# Patient Record
Sex: FEMALE | Race: WHITE | NOT HISPANIC OR LATINO | Employment: FULL TIME | URBAN - METROPOLITAN AREA
[De-identification: names, ages, dates, MRNs, and addresses within clinical notes are randomized per-mention and may not be internally consistent; named-entity substitution may affect disease eponyms.]

---

## 2017-04-21 ENCOUNTER — ALLSCRIPTS OFFICE VISIT (OUTPATIENT)
Dept: OTHER | Facility: OTHER | Age: 57
End: 2017-04-21

## 2017-04-27 DIAGNOSIS — G40.209 LOCALZ-RLTD SYMPTOMATIC EPILEPSY W CMPLX PART SZ, NOTINTRAC, WO STATUS (HCC): ICD-10-CM

## 2017-08-11 ENCOUNTER — TRANSCRIBE ORDERS (OUTPATIENT)
Dept: ADMINISTRATIVE | Facility: HOSPITAL | Age: 57
End: 2017-08-11

## 2017-08-11 ENCOUNTER — ALLSCRIPTS OFFICE VISIT (OUTPATIENT)
Dept: OTHER | Facility: OTHER | Age: 57
End: 2017-08-11

## 2017-08-11 DIAGNOSIS — G40.209 COMPLEX PARTIAL SEIZURES WITH CONSCIOUSNESS IMPAIRED (HCC): Primary | ICD-10-CM

## 2017-09-06 ENCOUNTER — GENERIC CONVERSION - ENCOUNTER (OUTPATIENT)
Dept: OTHER | Facility: OTHER | Age: 57
End: 2017-09-06

## 2017-09-06 ENCOUNTER — HOSPITAL ENCOUNTER (OUTPATIENT)
Dept: NEUROLOGY | Facility: CLINIC | Age: 57
Discharge: HOME | End: 2017-09-06
Payer: COMMERCIAL

## 2017-09-06 DIAGNOSIS — G40.209 COMPLEX PARTIAL SEIZURES WITH CONSCIOUSNESS IMPAIRED (HCC): ICD-10-CM

## 2017-09-06 PROCEDURE — 95816 EEG AWAKE AND DROWSY: CPT

## 2017-11-07 ENCOUNTER — ALLSCRIPTS OFFICE VISIT (OUTPATIENT)
Dept: OTHER | Facility: OTHER | Age: 57
End: 2017-11-07

## 2017-11-07 ENCOUNTER — GENERIC CONVERSION - ENCOUNTER (OUTPATIENT)
Dept: OTHER | Facility: OTHER | Age: 57
End: 2017-11-07

## 2017-11-07 NOTE — PROGRESS NOTES
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)    Plan  Localization-related focal epilepsy with complex partial seizures, Polymicrogyria    · LamoTRIgine 200 MG Oral Tablet (LaMICtal); take 300 mg in the morning and  400 mg nightly   Rx By: Jose Cruz Musa; Dispense: 90 Days ; #:315 Tablet; Refill: 3;For: Localization-related focal epilepsy with complex partial seizures, Polymicrogyria; MAMTA = N; Verified Transmission to Pull); Last Updated By: System, [employer]Script[employer]; 8/11/2017 2:10:24 PM   · Zonisamide 100 MG Oral Capsule; Take 1 tablet in the morning, and 2 tablets in  the evening   Rx By: Jose Cruz Musa; Dispense: 90 Days ; #:270 Capsule; Refill: 3;For: Localization-related focal epilepsy with complex partial seizures, Polymicrogyria; MAMTA = N; Verified Transmission to Pull); Last Updated By: System, SureScript[employer]; 8/11/2017 2:10:25 PM   · EEG AWAKE (ROUTINE); Status:Active; Requested for:11Aug2017;    Perform:[facility]; Order Comments:55 yo woman with right hemisphere polymicrogyria and localization related epilepsy  Please do routine EEG to better characterize seizures and look for changes with photic stimulation  Please do photic stimulation  ; Due:11Aug2018; Last Updated By:Frederic Almazan; 8/11/2017 2:29:23 PM;Ordered; For:Localization-related focal epilepsy with complex partial seizures, Polymicrogyria; Ordered By:Amador Novoa;   · Follow-up visit in 3 months Evaluation and Treatment  Follow-up 30 min  Status:  Complete  Done: [id number]   Ordered; For: Localization-related focal epilepsy with complex partial seizures, Polymicrogyria;  Ordered By: Jose Cruz Musa Performed:  Due: [id number]; Last Updated By: Sharlene Mcdowell; 8/11/2017 2:23:15 PM    Discussion/Summary  Discussion Summary:   Vitaly Husbands is a 54 yo woman with a history of localization related epilepsy likely right hemisphere in onset, possibly right temporal lobe who is returning to Epilepsy clinic for follow up of her seizures  Prior neurologic examination was normal   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  She has continued to have seizures despite the recent increase in her Lamotrigine  She has noted some possible side effects with Zonisamide, so I will have her increase her Lamotrigine further  will increase her Lamotrigine to 300 mg in the morning and 400 mg at night  This could be increased further to 400 mg twice a day if she would continue to have seizures  better chararacerize her seizures, I will have her get a routine EEG before her next visit  spent over 25 minutes with the patient with greater than 50% of that time spent counseling and coordinating her care, specifically discussing medication changes noted above    will return to the office in about 3 months    given to patient:Increase your Lamotrigine to 300 mg in the morning and 400 mg at night  If you feel side effects such as dizziness, nausea, off balance, etc, please give our office a call  Continue to take Zonisamide unchanged  Please get a routine EEG before your next appointment  Chief Complaint  Chief Complaint Free Text Note Form: Pt present for follow up regarding seizure      History of Present Illness  HPI: medications:Zonisamide 100 mg in the morning and 200 mg at night  Lamotrigine 300 mg twice a dayCalcium and vit D  Stephie Woo is a 56 yo woman with a history of localization related epilepsy likely right hemisphere in onset, possibly right temporal lobe who is returning to Epilepsy clinic for follow up of her seizures  I last saw her on 4/21 as an initial visit  At that time, she had not had any seizures since she was started on Zonisamide  No changes were made to her medications  her last visit, she had about 4 seizures one shortly after her last visit, and the second occurred last night   With these episodes, she was unresponsive to others around her  She also had an episode in May where she felt a little lightheaded and appeared confused  She was giving just one word answers  Also, in June, she had another episode where she felt lightheaded and had garbled speech, but with this episode, she started staring and was not responding  She has been having some trouble with feeling anxious and she has been under more stress because of a recent death in the family  She called into the office in the interim and her Lamotrigine dose was increased to her current dose  history was also reviewed with her sister, who was present at todayâs visit  Characteristics:Simple partial seizures  manifest as a feeling of dÃ©jÃ  vu and occasionally also have some circling left arm movements  These are typically quite brief, without any alteration of her consciousness and occur about 1-2 times a month  Complex partial seizures  manifest as staring and unresponsiveness, occasionally with left leg shaking, hand automatisms (unclear laterality) and oral automatisms  These appear to have occurred on three occasions in January and March of 2017  Secondarily generalized tonic clonic seizures  no clear warning  She will lose consciousness and be unresponsive, on occasion being told she also had whole body shaking  Medications: Phenytoin (changed due to potential for side effects), Levetiracetam (behavioral changes)       Current Meds   1  Calcium 600+D 600-200 MG-UNIT Oral Tablet; TAKE 1 TABLET TWICE DAILY; Therapy: (Recorded:06Nov2013) to Recorded   2  LamoTRIgine 200 MG Oral Tablet; take 300 mg twice a day  Requested for: [id number];   Last Rx:19Jun2017 Ordered   3  Zonegran 100 MG Oral Capsule; Take 1 tablet in the morning, and 2 tablets in the   evening; Therapy: (Recorded:21Apr2017) to Recorded  Medication List Reviewed: The medication list was reviewed and updated today  Allergies  1   No Known Drug Allergies    Future Appointments    Date/Time Provider Specialty Site   11/07/2017 02:30 PM DONNIE Espinoza   Neurology [facility]     Signatures   Electronically signed by : DONNIE Salinas ; Nov 6 2017 10:58PM EST                       (Author)

## 2018-01-11 NOTE — PROCEDURES
Procedures by Jimi Green MD at 2017   2:14 PM      Author:  Jimi Green MD Service:  Neurology Author Type:  Physician    Filed:  2017  2:38 PM Date of Service:  2017  2:14 PM Status:  Signed    :  Jimi Green MD (Physician)        Procedure Orders:       1  EEG Routine and awake [[id number]] ordered by  at 17 1425                  ELECTROENCEPHALOGRAM (EEG)      Patient Name:  Kelley Templeton  MRN: [id number]   :  1960 File #: IHC [id number]   Age: 55 y o  Encounter #: [id number]   Date performed: 2017            Report date: 2017          Study type: Routine EEG    ICD 10 diagnosis: Complex partial epilepsy intractable without status  G40 219    Start time: 09:40 End time: 10:16     -------------------------------------------------------------------------------------------------------------------   Patient History: This recording was observed in a 55 y o  female with  localization related epilepsy to better characterize epilepsy  Medications include: Lamotrigine and Zonisamide  -------------------------------------------------------------------------------------------------------------------   Description of Procedure:  ·  32 channel digital recording with electrodes placed according to the International 10-20 system with additional  T1/T2 electrodes, EOG, EKG, and simultaneous  video  The recording was technically satisfactory  -------------------------------------------------------------------------------------------------------------------   EEG Description:    The recording was performed with the patient awake  She was fully oriented  During wakefulness, there were long runs of poorly regulated, low amplitude, posteriorly  dominant, symmetric 7-9 cps alpha rhythm that attenuated with eye opening  There were symmetric low amplitude, frontally dominant beta activities    There were sporadic low to medium amplitude, right temporal theta and delta activities  Drowsiness and sleep were not attained  -------------------------------------------------------------------------------------------------------------------   Activation Procedures:  Hyperventilation was performed for 3-4  minutes and did not produce any changes  Stepped photic stimulation between 1-20 cps was performed and induced symmetric  photic driving  Other findings: The single lead ECG demonstrated normal sinus rhythm    -------------------------------------------------------------------------------------------------------------------   EEG Interpretation: This Routine EEG recorded during wakefulness is abnormal   Sporadic right temporal theta and delta activities suggest an area of underlying neuronal dysfunction  Junior Adams MD   [address] [facility]               Received for:Amador WELCH    Sep  6 2017  2:38PM [facility] Standard Time

## 2018-01-14 VITALS
BODY MASS INDEX: 23.98 KG/M2 | SYSTOLIC BLOOD PRESSURE: 140 MMHG | DIASTOLIC BLOOD PRESSURE: 80 MMHG | HEIGHT: 66 IN | WEIGHT: 149.19 LBS | HEART RATE: 70 BPM | RESPIRATION RATE: 16 BRPM

## 2018-01-22 VITALS
BODY MASS INDEX: 23.73 KG/M2 | OXYGEN SATURATION: 97 % | HEART RATE: 83 BPM | RESPIRATION RATE: 16 BRPM | SYSTOLIC BLOOD PRESSURE: 136 MMHG | WEIGHT: 147 LBS | DIASTOLIC BLOOD PRESSURE: 78 MMHG

## 2018-02-14 ENCOUNTER — OFFICE VISIT (OUTPATIENT)
Dept: NEUROLOGY | Facility: CLINIC | Age: 58
End: 2018-02-14
Payer: COMMERCIAL

## 2018-02-14 ENCOUNTER — TELEPHONE (OUTPATIENT)
Dept: NEUROLOGY | Facility: CLINIC | Age: 58
End: 2018-02-14

## 2018-02-14 VITALS
HEIGHT: 67 IN | DIASTOLIC BLOOD PRESSURE: 60 MMHG | WEIGHT: 146 LBS | SYSTOLIC BLOOD PRESSURE: 135 MMHG | HEART RATE: 77 BPM | BODY MASS INDEX: 22.91 KG/M2

## 2018-02-14 DIAGNOSIS — G40.209 LOCALIZATION-RELATED FOCAL EPILEPSY WITH COMPLEX PARTIAL SEIZURES (HCC): Primary | ICD-10-CM

## 2018-02-14 DIAGNOSIS — Q04.3 POLYMICROGYRIA (HCC): ICD-10-CM

## 2018-02-14 PROCEDURE — 99214 OFFICE O/P EST MOD 30 MIN: CPT | Performed by: PSYCHIATRY & NEUROLOGY

## 2018-02-14 RX ORDER — ZONISAMIDE 100 MG/1
CAPSULE ORAL
COMMUNITY
End: 2018-02-14 | Stop reason: SDUPTHER

## 2018-02-14 RX ORDER — B-COMPLEX WITH VITAMIN C
1 TABLET ORAL 2 TIMES DAILY
COMMUNITY

## 2018-02-14 RX ORDER — ZONISAMIDE 100 MG/1
200 CAPSULE ORAL 2 TIMES DAILY
Qty: 120 CAPSULE | Refills: 1 | Status: SHIPPED | OUTPATIENT
Start: 2018-02-14 | End: 2018-04-16 | Stop reason: ALTCHOICE

## 2018-02-14 RX ORDER — ZONISAMIDE 100 MG/1
200 CAPSULE ORAL 2 TIMES DAILY
Qty: 360 CAPSULE | Refills: 3 | Status: SHIPPED | OUTPATIENT
Start: 2018-02-14 | End: 2018-07-09 | Stop reason: SDUPTHER

## 2018-02-14 RX ORDER — LAMOTRIGINE 200 MG/1
TABLET ORAL
COMMUNITY
End: 2018-07-09 | Stop reason: SDUPTHER

## 2018-02-14 NOTE — PATIENT INSTRUCTIONS
-- Continue your lamotrigine 300 mg in the morning and 400 mg at night  -- Please increase your Zonisamide to 200 mg twice a day  -- please call our office if any problems arise

## 2018-02-14 NOTE — TELEPHONE ENCOUNTER
Just an FYI  The patient called to report that she "may or may not have a sz last night"  Pt has appt today w/ you @ 11am and will discuss in detail at that time

## 2018-02-14 NOTE — PROGRESS NOTES
Patient ID: Clementina Garcia is a 65 y o  female with localization related epilepsy due to right hemisphere polymicrogyria, who is returning to Neurology office for follow up of her seizure  Assessment/Plan:    Localization-related focal epilepsy with complex partial seizures ([facility] [address] )  She unfortunately had another seizure since her last visit  She appears to be having some manageable peak dose side effects with Lamotrigine, so we likely cannot increase this dose any further  She does not recall having had side effects with Zonisamide in the past, so I will increase the dose of this medication  -- She will increase Zonisamide to 200 mg twice a day  I discussed the risks and rationale of this increase  -- she will continue lamotrigine unchanged and will continue to take her medications with food  -- we extensively discussed seizure safety including going to water aerobics and the risks associated with being in a pool  I spent a total of 25 min with the patient with greater than 50% of that time spent counseling and coordinating her care, specifically discussing seizure safety precautions, medications adjustments, and plan as noted above  Subjective:    HPI  Current seizure medications:  1  Lamotrigine 300 mg in the morning and 400 mg at night  2  Zonisamide 100 mg in the morning and 200 mg at night  Other medications as per Epic  I last saw her in the office on 11/7/2017  At that time, she had still experienced some seizures, so her dose of Lamotrigine was increased to her current dose  Since her last appointment, she was doing well with the increased lamotrigine  She did start having some trouble with blurred vision, which typically occurs in the mornings after taking her medications, but this is better when she eats around when she takes her medications   She had two episodes of just feeling a little lightheaded, without any alteration of consciousness, but yesterday, she had another complex partial seizure  She was at the gym doing water aerobics with her cousins, when she felt "goofy" which is her typical aura  She then was noted by family to be unable to speak and unresponsive  She was confused afterwards  The following portions of the patient's history were reviewed and updated as appropriate: allergies, current medications and problem list          Objective:    Blood pressure 135/60, pulse 77, height 5' 6 5" (1 689 m), weight 66 2 kg (146 lb)  Physical Exam    Neurological Exam      ROS:    Review of Systems   Constitutional: Positive for fatigue  Negative for appetite change and fever  HENT: Negative  Negative for hearing loss, tinnitus, trouble swallowing and voice change  Eyes: Negative  Negative for photophobia and pain  Respiratory: Negative  Negative for shortness of breath  Cardiovascular: Negative  Negative for palpitations  Gastrointestinal: Negative  Negative for nausea and vomiting  Endocrine: Negative  Negative for cold intolerance and heat intolerance  Genitourinary: Negative for dysuria and urgency  Musculoskeletal: Negative  Negative for myalgias and neck pain  Skin: Negative  Negative for rash  Allergic/Immunologic: Negative  Neurological: Positive for seizures  Negative for dizziness, tremors, syncope, facial asymmetry, speech difficulty, weakness, light-headedness, numbness and headaches  Hematological: Negative  Does not bruise/bleed easily  Psychiatric/Behavioral: Negative for confusion, hallucinations and sleep disturbance  The patient is nervous/anxious

## 2018-02-15 DIAGNOSIS — G40.109 LOCALIZATION-RELATED EPILEPSY (HCC): Primary | ICD-10-CM

## 2018-02-15 RX ORDER — ZONISAMIDE 100 MG/1
CAPSULE ORAL
Qty: 360 CAPSULE | Refills: 3 | Status: SHIPPED | OUTPATIENT
Start: 2018-02-15 | End: 2018-04-16 | Stop reason: ALTCHOICE

## 2018-02-15 NOTE — ASSESSMENT & PLAN NOTE
She unfortunately had another seizure since her last visit  She appears to be having some manageable peak dose side effects with Lamotrigine, so we likely cannot increase this dose any further  She does not recall having had side effects with Zonisamide in the past, so I will increase the dose of this medication  -- She will increase Zonisamide to 200 mg twice a day  I discussed the risks and rationale of this increase  -- she will continue lamotrigine unchanged and will continue to take her medications with food  -- we extensively discussed seizure safety including going to water aerobics and the risks associated with being in a pool

## 2018-03-23 ENCOUNTER — TELEPHONE (OUTPATIENT)
Dept: NEUROLOGY | Facility: CLINIC | Age: 58
End: 2018-03-23

## 2018-03-23 NOTE — TELEPHONE ENCOUNTER
----- Message from [facility] sent at 3/23/2018  8:20 AM EDT -----  Regarding: Prescription Question  Contact: [phone]  I have just been prescribed Pantoprazole S[address] 40 mg tab, once daily for acid reflux  One of the possible side effects listed is seizures  Do you feel it is safe for me to take this medication? I have not have any seizures since my last visit, I have only had one lightheaded spell of no consequence    Thanks,  Toya Kahn

## 2018-03-23 NOTE — TELEPHONE ENCOUNTER
I do not have any objections to her taking pantoprazole  That medication is not likely to cause seizures

## 2018-04-16 ENCOUNTER — TELEPHONE (OUTPATIENT)
Dept: NEUROLOGY | Facility: CLINIC | Age: 58
End: 2018-04-16

## 2018-04-16 ENCOUNTER — HOSPITAL ENCOUNTER (EMERGENCY)
Facility: HOSPITAL | Age: 58
Discharge: HOME | End: 2018-04-16
Attending: EMERGENCY MEDICINE | Admitting: EMERGENCY MEDICINE
Payer: COMMERCIAL

## 2018-04-16 VITALS
HEIGHT: 66 IN | OXYGEN SATURATION: 99 % | HEART RATE: 81 BPM | TEMPERATURE: 98.1 F | DIASTOLIC BLOOD PRESSURE: 70 MMHG | BODY MASS INDEX: 24.27 KG/M2 | WEIGHT: 151 LBS | RESPIRATION RATE: 18 BRPM | SYSTOLIC BLOOD PRESSURE: 153 MMHG

## 2018-04-16 DIAGNOSIS — R56.9 SEIZURE (HCC): Primary | ICD-10-CM

## 2018-04-16 LAB
ATRIAL RATE: 104 BPM
P AXIS: 52 DEGREES
PR INTERVAL: 180 MS
QRS AXIS: -60 DEGREES
QRSD INTERVAL: 90 MS
QT INTERVAL: 354 MS
QTC INTERVAL: 465 MS
T WAVE AXIS: 49 DEGREES
VENTRICULAR RATE: 104 BPM

## 2018-04-16 PROCEDURE — 99284 EMERGENCY DEPT VISIT MOD MDM: CPT

## 2018-04-16 PROCEDURE — 93010 ELECTROCARDIOGRAM REPORT: CPT | Performed by: INTERNAL MEDICINE

## 2018-04-16 PROCEDURE — 36415 COLL VENOUS BLD VENIPUNCTURE: CPT | Performed by: EMERGENCY MEDICINE

## 2018-04-16 PROCEDURE — 80203 DRUG SCREEN QUANT ZONISAMIDE: CPT | Performed by: EMERGENCY MEDICINE

## 2018-04-16 PROCEDURE — 93005 ELECTROCARDIOGRAM TRACING: CPT

## 2018-04-16 PROCEDURE — 80175 DRUG SCREEN QUAN LAMOTRIGINE: CPT | Performed by: EMERGENCY MEDICINE

## 2018-04-16 RX ORDER — PANTOPRAZOLE SODIUM 40 MG/1
40 TABLET, DELAYED RELEASE ORAL DAILY
COMMUNITY

## 2018-04-16 NOTE — DISCHARGE INSTRUCTIONS
Nonepileptic Seizures   WHAT YOU NEED TO KNOW:   A nonepileptic seizure (SHAVON) is a short period of changes in how you move, think, or feel  It is sometimes called a nonepileptic event or episode  A SHAVON looks like an epileptic seizure, but there are no electrical changes in the brain  Epilepsy medicine will not stop or prevent a SHAVON  A SHAVON is a serious condition  Early diagnosis and treatment are needed to prevent more problems  DISCHARGE INSTRUCTIONS:   Call 911 for any of the following:   · You have chest pain, tightness, or pressure that may spread to your shoulders, arms, jaw, neck, or back  · You are having breathing problems and your lips, fingernails, or face turn blue  · You had a seizure that continued for more than 5 minutes  Return to the emergency department if:   · You feel like fainting or are lightheaded or too dizzy to stand up  · You were injured during or after a seizure  · You think about hurting or killing yourself or someone else  Contact your healthcare provider if:   · You are depressed and feel you cannot cope with your illness  · You are confused or cannot think clearly  · You have new symptoms that you did not have at your last healthcare provider visit  · You have questions or concerns about your condition or care  Medicines: You may need any of the following:  · Medicines  may be given to treat a physical cause, such as blood sugar or blood pressure problems  Medicines may instead be given for severe mental stress if that is the cause of your SHAVON  You may need antianxiety medicines to help keep you calm and relaxed  Antidepressants help decrease depression  · Take your medicine as directed  Contact your healthcare provider if you think your medicine is not helping or if you have side effects  Tell him or her if you are allergic to any medicine  Keep a list of the medicines, vitamins, and herbs you take  Include the amounts, and when and why you take them  Bring the list or the pill bottles to follow-up visits  Carry your medicine list with you in case of an emergency  What you can do to manage SHAVON:   · Ask what safety precautions you should take  Talk with your healthcare provider about driving  You may not be able to drive until you are seizure-free for a period of time  You will need to check the law where you live  Also talk to your healthcare provider about swimming and bathing  You may drown or develop life-threatening heart or lung damage if you have a seizure in water  · Tell your friends, family members, and coworkers that you have had a seizure  Give them written instructions to follow if you have another seizure  Your healthcare provider can help you create a list that is specific to your signs and symptoms  · Keep a seizure diary  This can help you find your triggers and avoid them  Write down the dates of your seizures, where you were, and what you were doing  Include how you felt before and after  Possible triggers include illness, lack of sleep, hormonal changes, alcohol, drugs, lights, or stress  What you can do to prevent a SHAVON:  You may not be able to prevent every seizure  The following can help you manage triggers that may make a seizure start:  · Set a regular sleep schedule  Try to go to sleep and wake up at the same times each day  Sleep problems can trigger a SHAVON  Talk to your healthcare provider if you have trouble sleeping  · Exercise as often as possible  Exercise can relieve stress and help you sleep better  You may feel better with 30 minutes of exercise most days of the week  Your healthcare provider can help you create a safe exercise plan  · Limit or do not drink alcohol  Alcohol can trigger a SHAVON  Ask your healthcare provider how much alcohol is safe for you to drink  A drink of alcohol is 12 ounces of beer, 1½ ounces of liquor, or 5 ounces of wine  · Do not use illegal drugs  Drugs can trigger a SHAVON   Talk to your healthcare provider if you use illegal drugs and need help to quit  · Manage stress  Breathe deeply and slowly when you feel stressed or anxious  Relax each part of your body one at a time  Try activities that you find relaxing, such as yoga or a short walk  Music can help you relax  You may also want to join a support group so you can talk with others who have SHAVON  Talk to someone you trust about your feelings  · Do not smoke  Nicotine and other chemicals in cigarettes and cigars can make stress and anxiety worse  Ask your healthcare provider for information if you currently smoke and need help to quit  E-cigarettes or smokeless tobacco still contain nicotine  Talk to your healthcare provider before you use these products  Follow up with your healthcare provider as directed: Your healthcare provider may refer you to a therapist or psychologist  Write down your questions so you remember to ask them during your visits  © 2017 [employer] [employer] Information is for End User's use only and may not be sold, redistributed or otherwise used for commercial purposes  All illustrations and images included in CareNotes® are the copyrighted property of [employer] , [employer]  or [employer]  The above information is an  only  It is not intended as medical advice for individual conditions or treatments  Talk to your doctor, nurse or pharmacist before following any medical regimen to see if it is safe and effective for you

## 2018-04-16 NOTE — ED PROVIDER NOTES
History  Chief Complaint   Patient presents with    Seizure - Prior Hx Of     Patient presents to [facility]  being hypertensive and tachycardic post seizure activity while at work today  81-year-old female with history of catatonic seizures who presents to the see department after a seizure at work today  Patient is currently on lamotrigine and then ask him I would which she states she takes religiously today patient's boss states she when outside because she was feeling unwell which she usually does when she feels the onset of a seizure coming she was observed to be completely catatonic and unresponsive with her eyes wide open and mouth open  EMS was called and while transporting patient came to remained confused and postictal for a few minutes but is returned to baseline by time she has arrived to the emergency department  Patient recently had increase in her dose of zonisamide from 102 100 mg once a day each to 200 b i d  back in February due to a seizure  Patient denies any symptoms at this time is alert and oriented denies any nausea or vomiting denies any lightheadedness, chest pain or shortness of breath  Patient's remaining ROS negative  Patient denies lack of sleep, alcohol use drug use, or stressors the past few days  History provided by:  Patient   used: No    Seizure - Prior Hx Of   Seizure activity on arrival: no    Seizure type:  Partial complex  Preceding symptoms: aura    Initial focality:  None  Episode characteristics: unresponsiveness    Postictal symptoms: confusion and memory loss    Return to baseline: yes    Severity:  Moderate  Progression:  Resolved  Recent head injury:  No recent head injuries  PTA treatment:  None  History of seizures: yes        Prior to Admission Medications   Prescriptions Last Dose Informant Patient Reported? Taking?    Calcium Carbonate-Vitamin D (CALCIUM 600+D) 600-200 MG-UNIT TABS 4/16/2018 at Unknown time  Yes Yes   Sig: Take 1 tablet by mouth 2 (two) times a day   lamoTRIgine (LaMICtal) 200 MG tablet 4/16/2018 at Unknown time  Yes Yes   Sig: Take by mouth Take 300 mg in the morning and 400 mg at night  pantoprazole (PROTONIX) 40 mg tablet 4/16/2018 at Unknown time  Yes Yes   Sig: Take 40 mg by mouth daily   zonisamide (ZONEGRAN) 100 mg capsule 4/16/2018 at Unknown time  No Yes   Sig: Take 2 capsules (200 mg total) by mouth 2 (two) times a day   Patient taking differently: Take 400 mg by mouth 2 (two) times a day        Facility-Administered Medications: None       Past Medical History:   Diagnosis Date    Seizure [facility])        Past Surgical History:   Procedure Laterality Date    BRAIN SURGERY      HYSTERECTOMY      OVARIAN CYST SURGERY      WISDOM TOOTH EXTRACTION         Family History   Problem Relation Age of Onset    Seizures Mother     Stroke Mother     Thyroid disease Mother     Heart disease Father      I have reviewed and agree with the history as documented  Social History   Substance Use Topics    Smoking status: Former Smoker    Smokeless tobacco: Never Used    Alcohol use No        Review of Systems   Constitutional: Negative for chills and fever  HENT: Negative for sore throat  Eyes: Negative for visual disturbance  Respiratory: Negative for chest tightness, shortness of breath and wheezing  Cardiovascular: Negative for chest pain  Gastrointestinal: Negative for abdominal pain, blood in stool, constipation, diarrhea, nausea and vomiting  Genitourinary: Negative for dysuria and hematuria  Musculoskeletal: Negative for arthralgias and myalgias  Skin: Negative for color change  Neurological: Positive for seizures  Negative for light-headedness  Hematological: Negative for adenopathy  Psychiatric/Behavioral: Negative for agitation and behavioral problems  All other systems reviewed and are negative        Physical Exam  ED Triage Vitals [04/16/18 1336]   Temperature Pulse Respirations Blood Pressure SpO2   98 1 °F (36 7 °C) 104 18 161/77 99 %      Temp Source Heart Rate Source Patient Position - Orthostatic VS BP Location FiO2 (%)   Oral Monitor Lying Right arm --      Pain Score       --           Orthostatic Vital Signs  Vitals:    04/16/18 1336 04/16/18 1510   BP: 161/77 153/70   Pulse: 104 81   Patient Position - Orthostatic VS: Lying        Physical Exam   Constitutional: She is oriented to person, place, and time  She appears well-developed and well-nourished  No distress  HENT:   Head: Normocephalic and atraumatic  Eyes: Conjunctivae and EOM are normal  No scleral icterus  Neck: Normal range of motion  Neck supple  Cardiovascular: Normal rate, regular rhythm and normal heart sounds  No murmur heard  Pulmonary/Chest: Effort normal and breath sounds normal  No respiratory distress  Abdominal: Soft  Bowel sounds are normal  There is no tenderness  Musculoskeletal: Normal range of motion  Neurological: She is alert and oriented to person, place, and time  Skin: Skin is warm and dry  Psychiatric: She has a normal mood and affect  Her behavior is normal    Nursing note and vitals reviewed  ED Medications  Medications - No data to display    Diagnostic Studies  Results Reviewed     Procedure Component Value Units Date/Time    Zonisamide level [[id number]] Collected:  04/16/18 1508    Lab Status: In process Specimen:  Blood from Arm, Left Updated:  04/16/18 1513    Lamotrigine level [[id number]] Collected:  04/16/18 1508    Lab Status:   In process Specimen:  Blood from Arm, Left Updated:  04/16/18 1513                 No orders to display         Procedures  Procedures      Phone Consults  ED Phone Contact    ED Course  ED Course                                MDM  Number of Diagnoses or Management Options  Seizure [facility]): established and worsening  Diagnosis management comments:   71-year-old female with history of seizures presents to the emergency department after seizure, discussed patient with epileptologist on call who stated no need to change her medications this time however ordered levels of her 2 medications and told patient to follow up with her neurologist in the next 2 days  Patient is agreeable to this plan will be discharged home  Amount and/or Complexity of Data Reviewed  Clinical lab tests: ordered and reviewed  Tests in the medicine section of CPT®: ordered and reviewed  Discuss the patient with other providers: yes      CritCare Time    Disposition  Final diagnoses:   Seizure ([address] )     Time reflects when diagnosis was documented in both MDM as applicable and the Disposition within this note     Time User Action Codes Description Comment    4/16/2018  2:51 PM Jorge Veras Add [R56 9] Seizure [facility])       ED Disposition     ED Disposition Condition Comment    Discharge  [facility] discharge to home/self care      Condition at discharge: Stable        Follow-up Information     Follow up With Specialties Details Why Contact Info Additional Information    Vladimir Wasserman MD Neurology Schedule an appointment as soon as possible for a visit in 1 day  [address] [facility] [phone]       [address] [place] [address] Emergency Department Emergency Medicine Go to If symptoms worsen [address] [facility] [address] [facility] ED, [address], [address]        Discharge Medication List as of 4/16/2018  3:18 PM      CONTINUE these medications which have NOT CHANGED    Details   Calcium Carbonate-Vitamin D (CALCIUM 600+D) 600-200 MG-UNIT TABS Take 1 tablet by mouth 2 (two) times a day, Historical Med      lamoTRIgine (LaMICtal) 200 MG tablet Take by mouth Take 300 mg in the morning and 400 mg at night  , Historical Med      zonisamide (ZONEGRAN) 100 mg capsule Take 2 capsules (200 mg total) by mouth 2 (two) times a day, Starting Wed 2/14/2018, Normal      pantoprazole (PROTONIX) 40 mg tablet Take 40 mg by mouth daily, Historical Med           No discharge procedures on file  ED Provider  Attending physically available and evaluated Mary Ann Danielson  DELVIS managed the patient along with the ED Attending      Electronically Signed by         Criss Paul MD  04/16/18 7309

## 2018-04-16 NOTE — ED ATTENDING ATTESTATION
Brian Chanel MD, saw and evaluated the patient  I have discussed the patient with the resident/non-physician practitioner and agree with the resident's/non-physician practitioner's findings, Plan of Care, and MDM as documented in the resident's/non-physician practitioner's note, except where noted  All available labs and Radiology studies were reviewed  At this point I agree with the current assessment done in the Emergency Department  I have conducted an independent evaluation of this patient a history and physical is as follows:    History was ob    tained by the patient and her employer who witnessed the episode  Patient has a history of epilepsy with generalized tonic-clonic seizures and catatonic seizures  This morning the patient felt well when she got up for work and her work day began normally  This afternoon just prior to arrival the patient felt and ill described, nonspecific sense of feeling unwell and felt as though she needed to go outside to get some fresh air the patient told her employ year who had a co-worker company her outside  Once the patient got outside the patient had a staring episode where she was unresponsive but maintained her upright postural tone  EMS was summoned and transported patient to the hospital   EN route the patient regained consciousness was briefly postictal and now is back to her normal mental status  The patient states she has no complaint at this time and feels completely normal  Physical exam demonstrates a pleasant alert nontoxic female in no acute distress  HEENT exam was normal   There is no evidence of craniofacial trauma  The lungs are clear with equal breath sounds  The heart had a regular rate rhythm  The abdomen is soft and nontender  Extremities are nontender with a full range of motion  The neurologic exam is nonfocal  The patient was alert and oriented x3    Critical Care Time  CritCare Time    Procedures

## 2018-04-17 ENCOUNTER — OFFICE VISIT (OUTPATIENT)
Dept: NEUROLOGY | Facility: CLINIC | Age: 58
End: 2018-04-17
Payer: COMMERCIAL

## 2018-04-17 VITALS
WEIGHT: 150.6 LBS | DIASTOLIC BLOOD PRESSURE: 69 MMHG | HEART RATE: 73 BPM | BODY MASS INDEX: 24.2 KG/M2 | SYSTOLIC BLOOD PRESSURE: 148 MMHG | HEIGHT: 66 IN

## 2018-04-17 DIAGNOSIS — Q04.3 POLYMICROGYRIA (HCC): ICD-10-CM

## 2018-04-17 DIAGNOSIS — G40.209 LOCALIZATION-RELATED FOCAL EPILEPSY WITH COMPLEX PARTIAL SEIZURES (HCC): Primary | ICD-10-CM

## 2018-04-17 LAB — LAMOTRIGINE SERPL-MCNC: 10.8 UG/ML (ref 2–20)

## 2018-04-17 PROCEDURE — 99214 OFFICE O/P EST MOD 30 MIN: CPT | Performed by: PSYCHIATRY & NEUROLOGY

## 2018-04-17 NOTE — PATIENT INSTRUCTIONS
-- We will arrange for you to be admitted to the Epilepsy Monitoring Unit (EMU) to capture your episodes on EEG and better determine what is causing them  This will help guide what we will need to do with your medications and make your events stop  -- Continue your medications unchanged for now

## 2018-04-17 NOTE — PROGRESS NOTES
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, who is returning to Neurology office for follow up of her seizures  Assessment/Plan:    Localization-related focal epilepsy with complex partial seizures ([address] )  She appears to have another breakthrough event despite reasonably high doses of lamotrigine and zonisamide  I discussed that although her episodes appear to be complex partial seizures, the fact that they have not responded to medications would raise the possibility there may be nonepileptic  She had previously been seizure-free for over 30 years on phenytoin, before this was changed ultimately to lamotrigine  She also had several years of seizure freedom on lamotrigine, until presumably being in status epilepticus in 2016, and then having recurrent episodes of staring and unresponsiveness since then  --because she has continued to have episodes of staring and unresponsiveness that have been resistant to treatment with medications, and that her seizure frequency has changed dramatically recently, I think it would be best to have her admitted to the epilepsy monitoring unit in order to better capture and characterize these events  Although I am suspicious that they are epileptic seizures, they have not responded to medications thus far, and have varied a little in what occurs with each event  --I will arrange for her to be admitted to the epilepsy monitoring unit to capture and characterize her events  While in the epilepsy monitoring unit, I would anticipate that her zonisamide and possibly her lamotrigine may be decreased in order to make her episodes more frequent  --she will continue her lamotrigine and zonisamide unchanged  If in the EMU her episodes are found to be epileptic seizures, she may benefit from changing her zonisamide to an alternative medication such as lacosamide or oxcarbazepine        I spent a total of 25 min with the patient with greater than 50% of that time spent counseling and coordinating her care, specifically discussing her diagnosis and plans for EMU admission, as detailed above       She will return to the office in about 3 months  Subjective:    HPI    Current seizure medications:  1  Lamotrigine 300 mg in the morning and 400 mg at night  2  Zonisamide 200 mg twice a day  Other medications as per [facility]  I last saw her in the office on 2/14/2018  At that time, she had experienced a breakthrough seizure, so her dose of Zonisamide was increased to 200 mg twice a day  She had been experiencing some potential side effects with her medications, but felt these were manageable if drinking plenty of fluids when taking her medications  Since her last visit, she was doing well on the higher dose of Zonisamide, but yesterday, she was at work and started to experience some odd vision changes  She went to her boss, and then began staring and was unresponsive  They were able to walk with her outside to get some air, but she stood and appeared "catatonic" and was not responding to those around her  This lasted for around 10 min and she was confused afterward  She was seen in the ED, and drug levels were checked, but no changes were made  She has been a little stressed recently, but not because of anything specific and not more than she had been previously  Prior Medications: Phenytoin (changed due to potential for side effects), Levetiracetam (behavioral changes)    Her history was also obtained from her brother-in-law, who was present at today's visit  I reviewed recent ED records, as documented in [employer]  The following portions of the patient's history were reviewed and updated as appropriate: allergies, current medications and problem list          Objective:    Blood pressure 148/69, pulse 73, height 5' 6" (1 676 m), weight 68 3 kg (150 lb 9 6 oz)      Physical Exam    Neurological Exam      ROS:    Review of Systems   Constitutional: Negative  Negative for appetite change and fever  HENT: Negative  Negative for hearing loss, tinnitus, trouble swallowing and voice change  Eyes: Negative  Negative for photophobia and pain  Blurred vision     Respiratory: Negative  Negative for shortness of breath  Cardiovascular: Negative  Negative for palpitations  Gastrointestinal: Negative  Negative for nausea and vomiting  Endocrine: Negative  Negative for cold intolerance and heat intolerance  Genitourinary: Negative  Negative for dysuria, frequency and urgency  Musculoskeletal: Negative for myalgias and neck pain  Muscle pain  Pain while walking     Skin: Negative  Negative for rash  Neurological: Positive for seizures, light-headedness and headaches  Negative for dizziness, tremors, syncope, facial asymmetry, speech difficulty, weakness and numbness  Increased sleepiness    Waking up at night  Double vision     Hematological: Negative  Does not bruise/bleed easily  Psychiatric/Behavioral: Negative for confusion, hallucinations and sleep disturbance  The patient is nervous/anxious           Depression

## 2018-04-17 NOTE — ASSESSMENT & PLAN NOTE
She appears to have another breakthrough event despite reasonably high doses of lamotrigine and zonisamide  I discussed that although her episodes appear to be complex partial seizures, the fact that they have not responded to medications would raise the possibility there may be nonepileptic  She had previously been seizure-free for over 30 years on phenytoin, before this was changed ultimately to lamotrigine  She also had several years of seizure freedom on lamotrigine, until presumably being in status epilepticus in 2016, and then having recurrent episodes of staring and unresponsiveness since then  --because she has continued to have episodes of staring and unresponsiveness that have been resistant to treatment with medications, and that her seizure frequency has changed dramatically recently, I think it would be best to have her admitted to the epilepsy monitoring unit in order to better capture and characterize these events  Although I am suspicious that they are epileptic seizures, they have not responded to medications thus far, and have varied a little in what occurs with each event  --I will arrange for her to be admitted to the epilepsy monitoring unit to capture and characterize her events  While in the epilepsy monitoring unit, I would anticipate that her zonisamide and possibly her lamotrigine may be decreased in order to make her episodes more frequent  --she will continue her lamotrigine and zonisamide unchanged  If in the EMU her episodes are found to be epileptic seizures, she may benefit from changing her zonisamide to an alternative medication such as lacosamide or oxcarbazepine

## 2018-04-18 LAB — ZONISAMIDE SERPL-MCNC: 24.3 UG/ML (ref 10–40)

## 2018-05-07 ENCOUNTER — TELEPHONE (OUTPATIENT)
Dept: NEUROLOGY | Facility: CLINIC | Age: 58
End: 2018-05-07

## 2018-05-18 ENCOUNTER — TELEPHONE (OUTPATIENT)
Dept: NEUROLOGY | Facility: CLINIC | Age: 58
End: 2018-05-18

## 2018-05-18 NOTE — TELEPHONE ENCOUNTER
pt called and states that she is pretty sure that she had a seizure this afternoon  she got lightheaded at work, walked over to others to let them know she didn't feel right,  they walked her outside and sat her down  she was rubbing sides in circular motion  feet continued to shuffled  she was answering their questions but very abrupt  whole episode lasting about 5 min   she doesn't remember walking outside  EMS was called  Bp 161/92blood sugar-94  refused to go to hospital   states that she was fine by the time ems got there  missed yesterday's am dose of meds, no new meds, had a cold 2 weeks ago, but nothing now  past couple of nights tossing and turning alot, last night was pretty bad  got approx 6 5 hours of sleep  usually sleeps 7 5 hours  thinks that alot of this is related to her nerves and stress  she states that she internalizes her stress, no new stress at this time      zonisamide 100mg 2 caps bid  Lamotrigine 200mg 1 5 am, 2 in pm  [phone]

## 2018-05-18 NOTE — TELEPHONE ENCOUNTER
Sorry to hear she had a seizure  She is scheduled for EMU admission next week and since her admission is scheduled so soon, I would keep her meds the same until we gain more information in the EMU  I will likely adjust her medications either way in the EMU

## 2018-05-22 ENCOUNTER — HOSPITAL ENCOUNTER (INPATIENT)
Facility: HOSPITAL | Age: 58
LOS: 6 days | Discharge: HOME | DRG: 101 | End: 2018-05-28
Attending: PSYCHIATRY & NEUROLOGY | Admitting: PSYCHIATRY & NEUROLOGY
Payer: COMMERCIAL

## 2018-05-22 ENCOUNTER — APPOINTMENT (INPATIENT)
Dept: NEUROLOGY | Facility: AMBULATORY SURGERY CENTER | Age: 58
DRG: 101 | End: 2018-05-22
Payer: COMMERCIAL

## 2018-05-22 DIAGNOSIS — Z98.890 HISTORY OF CRANIOTOMY: ICD-10-CM

## 2018-05-22 DIAGNOSIS — G40.209 LOCALIZATION-RELATED FOCAL EPILEPSY WITH COMPLEX PARTIAL SEIZURES (HCC): Primary | ICD-10-CM

## 2018-05-22 PROBLEM — K21.9 GERD (GASTROESOPHAGEAL REFLUX DISEASE): Status: ACTIVE | Noted: 2018-03-20

## 2018-05-22 PROCEDURE — 95951 PR EEG MONITORING/VIDEORECORD: CPT | Performed by: PSYCHIATRY & NEUROLOGY

## 2018-05-22 PROCEDURE — 95951 HB EEG MONITORING/VIDEORECORD: CPT

## 2018-05-22 PROCEDURE — 99223 1ST HOSP IP/OBS HIGH 75: CPT | Performed by: PSYCHIATRY & NEUROLOGY

## 2018-05-22 RX ORDER — PANTOPRAZOLE SODIUM 40 MG/1
40 TABLET, DELAYED RELEASE ORAL
Status: DISCONTINUED | OUTPATIENT
Start: 2018-05-22 | End: 2018-05-28 | Stop reason: HOSPADM

## 2018-05-22 RX ORDER — ACETAMINOPHEN 325 MG/1
650 TABLET ORAL EVERY 6 HOURS PRN
Status: DISCONTINUED | OUTPATIENT
Start: 2018-05-22 | End: 2018-05-28 | Stop reason: HOSPADM

## 2018-05-22 RX ORDER — DIPHENHYDRAMINE HCL 25 MG
25 TABLET ORAL EVERY 6 HOURS PRN
Status: DISCONTINUED | OUTPATIENT
Start: 2018-05-22 | End: 2018-05-28 | Stop reason: HOSPADM

## 2018-05-22 RX ORDER — LORAZEPAM 2 MG/ML
2 INJECTION INTRAMUSCULAR EVERY 8 HOURS PRN
Status: DISCONTINUED | OUTPATIENT
Start: 2018-05-22 | End: 2018-05-28 | Stop reason: HOSPADM

## 2018-05-22 RX ORDER — B-COMPLEX WITH VITAMIN C
1 TABLET ORAL 2 TIMES DAILY WITH MEALS
Status: DISCONTINUED | OUTPATIENT
Start: 2018-05-22 | End: 2018-05-28 | Stop reason: HOSPADM

## 2018-05-22 RX ORDER — LAMOTRIGINE 100 MG/1
300 TABLET ORAL DAILY
Status: DISCONTINUED | OUTPATIENT
Start: 2018-05-23 | End: 2018-05-24

## 2018-05-22 RX ORDER — ZONISAMIDE 100 MG/1
100 CAPSULE ORAL 2 TIMES DAILY
Status: DISCONTINUED | OUTPATIENT
Start: 2018-05-22 | End: 2018-05-23

## 2018-05-22 RX ORDER — LAMOTRIGINE 100 MG/1
400 TABLET ORAL
Status: DISCONTINUED | OUTPATIENT
Start: 2018-05-22 | End: 2018-05-24

## 2018-05-22 RX ADMIN — CALCIUM CARBONATE 500 MG (1,250 MG)-VITAMIN D3 200 UNIT TABLET 1 TABLET: at 17:14

## 2018-05-22 RX ADMIN — ZONISAMIDE 100 MG: 100 CAPSULE ORAL at 17:14

## 2018-05-22 RX ADMIN — ENOXAPARIN SODIUM 40 MG: 40 INJECTION SUBCUTANEOUS at 14:15

## 2018-05-22 RX ADMIN — LAMOTRIGINE 400 MG: 100 TABLET ORAL at 21:20

## 2018-05-22 NOTE — H&P
Epilepsy Attending Admission H&P  Searcy Sandifer Handlon 65 y o  female   : 1960  MRN: [id number]     Unit/Bed#: [facility] 718-01    Encounter: [id number]     -------------------------------------------------------------------------------------------------------------------                Outpatient Neurologist:  Dr Raymond Cruz                  Primary Care Physician:  Denisa Ortega MD      CC:  Seizures of staring and unresponsiveness   -------------------------------------------------------------------------------------------------------------------  HPI:    Zachariah Motley is a 65 y o   female with localization related epilepsy, prior right frontal mass s/p resection in , 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 who is admitted to the Epilepsy Monitoring Unit for evaluation of her events of unresponsiveness  Briefly reviewing her history, she had her first seizure around the age of 14years old, which manifested as unresponsiveness and whole body shaking  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  She was treated with Phenytoin, but continued to have some seizures in the , eventually being found to have a right frontal mass, which was resected around   She continued on phenytoin since that time and was seizure free for at least 30 years  In 2007, her phenytoin was changed to Levetiracetam due to risk of side effects, but she didn't tolerate this due to behavioral changes, so she was started on lamotrigine  She did well on lamotrigine without any definitive seizures (except for a black out spell in ), until 2016, when she started having events again  See below for a description of her events, but these mainly manifest with staring, unresponsiveness, and possible automatisms  She has had increasing doses of lamotrigine, addition of Zonisamide, and has continued to have these events  She previously was having some cognitive changes with the addition of Zonisamide   She currently takes her medications in the morning with food and states that she feels fine with taking it, however also states that she in general feels cognitively less sharp than she had in the past        History was also obtained from niece, who was present at the time of admission  I reviewed her prior records including clinic notes from Neurology and [facility], which are summarized and incorporated above      -------------------------------------------------------------------------------------------------------------------   Seizure/Spell Characteristics:     1  Focal aware psychic seizures (simple partial seizures)  Manifest as feeling of mitchell vu and possible arm circling  Have not occurred in a long time  2  Presumed focal unaware seizures (complex partial seizures)  She previously reported a feeling of a "chill", but currently experiences a weird "mush" feeling, then staring, being less responsive, losing memory, with repetitive rubbing with her arm and possible oral automatisms  These last for a few minutes and then she is still a little confused afterward  3  Generalized tonic clonic seizures: have not occurred since 1980s        -------------------------------------------------------------------------------------------------------------------  Prior Work-up  MRI brain:       7/23/2016: Abnormal sulcation involving the medial posterior right temporal lobe suggestive of polymicrogyria  Prior right anterior temporal lobectomy with expected remote postoperative changes  No acute hemorrhage or acute major vascular distribution infarction    REEGs:  7/23/2016:Frequent sharp waves in the right temporal maximal at T4 region, which indicates potential focal seizure in this region  Near continuous slowing in the right temporal region that indicates structural abnormality in this region      9/6/2017: Sporadic right temporal theta and delta activities     VEEGs: none      -------------------------------------------------------------------------------------------------------------------  SZ Risk Factors:   Uncomplicated pregnancy with normal development  No learning disabilities or cognitive delay  No h/o febrile seizures or strokes  She had a right frontal mass resected in 1987 and reportedly had encephalitis when 14years old  Her mother had epilepsy     -------------------------------------------------------------------------------------------------------------------  Prior RX: Phentoin (changed due to potential long-term side effects), Levetiracetam (behavioral changes)      -------------------------------------------------------------------------------------------------------------------  Current Medications:  1  Zonisamide 200 mg twice a day  2  Lamotrigine 300 mg in the morning and 400 mg at night  3  Calcium + Vitamin D  4  Pantoprazole 40 mg daily     ------------------------------------------------------------------------------------------------------------------  Past Medical / Surgical History/Social History/Family History:    Past Medical History:   Diagnosis Date    Hypertension     Seizure ([address] )     Thyroid disease      Past Surgical History:   Procedure Laterality Date    BRAIN SURGERY      HYSTERECTOMY      OVARIAN CYST SURGERY      WISDOM TOOTH EXTRACTION       Social History     Social History    Marital status: Single     Social History Main Topics    Smoking status: Former Smoker    Smokeless tobacco: Never Used    Alcohol use No    Drug use: No     Social History Narrative    Lives in [place]        Family History   Problem Relation Age of Onset    Seizures Mother     Stroke Mother     Thyroid disease Mother     Heart disease Father        Allergies:  No Known Allergies       ------------------------------------------------------------------------------------------------------------------  ROS:  A 12 system review of system was obtained and otherwise negative except as per HPI     ------------------------------------------------------------------------------------------------------------------  VITALS:  Blood pressure 112/59, pulse 77, temperature 98 5 °F (36 9 °C), temperature source Oral, resp  rate 18, height 5' 6" (1 676 m), weight 68 9 kg (151 lb 14 4 oz), SpO2 100 %  GENERAL EXAMINATION:   In general patient is well appearing and in no distress  There is no peripheral edema  NEUROLOGIC EXAMINATION:     Alert and oriented to person, date, location  Fund of knowledge is full with good understanding of medical situation  Recent and remote memory were intact    Mood and affect are appropriate  Attention is intact  Language function including fluency, naming, and comprehension intact  Cranial nerves: Pupils are equal round reactive to light and accommodation  Visual Fields are full to confrontation bilaterally  Optic discs are sharp with no evidence of papilledema  Extraocular movements are intact without nystagmus  Facial sensation is intact to light touch  No facial droop, face activates symmetrically  There is no dysarthria  Hearing was intact to finger rub  Tongue and uvula are midline and palate elevates symmetrically  Shoulder shrug  5/5  Motor Exam:  No pronator drift  Bulk and tone are normal  Strength is 5/5 throughout  Deep tendon reflexes: Biceps 2+, brachioradialis 2+, patellar 2+, Achilles 2+ bilaterally  Negative Odonnells      Sensation: Intact light touch    Coordination: Finger nose finger and heel to shin testing are without dysmetria  Gait: Negative romberg   Normal casual gait    ------------------------------------------------------------------------------------------------------------------  Labs:    Lamotrigine Lvl   Date Value Ref Range Status   04/16/2018 10 8 2 0 - 20 0 ug/mL Final     Comment:                                     Detection Limit = 1 0     Zonisamide Lvl   Date Value Ref Range Status   04/16/2018 24 3 10 0 - 40 0 ug/mL Final     Comment:                                     Detection Limit = 1 0                      -------------------------------------------------------------------------------------------------------------------  Impression and Plan: Adalid Jorge is a 65 y o  woman  with localization related epilepsy, 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, 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 who is admitted to the Epilepsy Monitoring Unit for evaluation of her events of unresponsiveness  Neurologic examination was normal      Based on the description of her events, these are most concerning for focal unaware seizures (complex partial seizures)  She does appear to have some automatisms suggesting temporal lobe involvement, which would correspond to interictal abnormalities on previous EEGs  That being said, her events have not responded despite adequate doses of two medications  I would like to determine if her current events are indeed epileptic seizures, and if so, better localize the seizures to see if other treatments, such as epilepsy surgery could be an option for her  I discussed the nature of the EMU admission, including risks, plans for weaning medications, and safety procedures  1)  Spells/Seizures:   1) Will start continuous video EEG monitoring to capture and characterize events  If epileptic seizures, it would be helpful to localize seizures as part of a phase I presurgical evaluation  2) Medications: Will decrease her Zonisamide to  100 mg twice a day starting tonight  She will continue Lamotrigine 300 mg in the morning and 400 mg at night  With her prior history of status, we will need to be cautious with weaning her medications  3) Additional diagnostic tests:  None today  Will consider sleep deprivation, hyperventilation, and photic stimulation if no events captured  4) Seizure/fall/status epilepticus precautions     5) Will order Ativan 2 mg as needed for more than two complex partial seizures or 1 Generalized tonic clonic seizure in a 24 hour period  2)  Co morbidities:   1) Hypertension: currently not on medications  Will follow BP  2) GERD: will continue Pantoprazole  3) Hypothyroidism  She had a TSH in 4/17/2017 which was normal  She will continue to follow with her PCP      3) DVT prophylaxis: Lovenox 40 mg daily   SCDs while in bed      Code status: Full code  -------------------------------------------------------------------------------------------------------------------    Renate Srinivasan MD             Date: 5/22/2018     Time: 12:50 PM  [facility] [facility] [place]

## 2018-05-23 ENCOUNTER — APPOINTMENT (INPATIENT)
Dept: NEUROLOGY | Facility: AMBULATORY SURGERY CENTER | Age: 58
DRG: 101 | End: 2018-05-23
Payer: COMMERCIAL

## 2018-05-23 PROCEDURE — 95951 HB EEG MONITORING/VIDEORECORD: CPT

## 2018-05-23 PROCEDURE — 95951 PR EEG MONITORING/VIDEORECORD: CPT | Performed by: PSYCHIATRY & NEUROLOGY

## 2018-05-23 PROCEDURE — 99232 SBSQ HOSP IP/OBS MODERATE 35: CPT | Performed by: PSYCHIATRY & NEUROLOGY

## 2018-05-23 RX ADMIN — LAMOTRIGINE 300 MG: 100 TABLET ORAL at 08:24

## 2018-05-23 RX ADMIN — ZONISAMIDE 100 MG: 100 CAPSULE ORAL at 08:24

## 2018-05-23 RX ADMIN — CALCIUM CARBONATE 500 MG (1,250 MG)-VITAMIN D3 200 UNIT TABLET 1 TABLET: at 08:24

## 2018-05-23 RX ADMIN — PANTOPRAZOLE SODIUM 40 MG: 40 TABLET, DELAYED RELEASE ORAL at 06:06

## 2018-05-23 RX ADMIN — CALCIUM CARBONATE 500 MG (1,250 MG)-VITAMIN D3 200 UNIT TABLET 1 TABLET: at 17:12

## 2018-05-23 RX ADMIN — LAMOTRIGINE 400 MG: 100 TABLET ORAL at 21:09

## 2018-05-23 RX ADMIN — ENOXAPARIN SODIUM 40 MG: 40 INJECTION SUBCUTANEOUS at 13:08

## 2018-05-23 NOTE — CASE MANAGEMENT
Initial Clinical Review    Admission: Date/Time/Statement: 5/22/18 @ 0842     Orders Placed This Encounter   Procedures    Inpatient Admission     Standing Status:   Standing     Number of Occurrences:   1     Order Specific Question:   Admitting Physician     Answer:   Arnulfo Ying [[id number]]     Order Specific Question:   Level of Care     Answer:   Med Surg [16]     Order Specific Question:   Bed Type     Answer:   EMU [8]     Order Specific Question:   Estimated length of stay     Answer:   More than 2 Midnights     Order Specific Question:   Certification     Answer:   I certify that inpatient services are medically necessary for this patient for a duration of greater than two midnights  See H&P and MD Progress Notes for additional information about the patient's course of treatment  History of Illness: Inocencio Herman is a 65 y o  woman  with localization related epilepsy, 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, 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 who is admitted to the Epilepsy Monitoring Unit for evaluation of her events of unresponsiveness  Neurologic examination was normal       Based on the description of her events, these are most concerning for focal unaware seizures (complex partial seizures)  She does appear to have some automatisms suggesting temporal lobe involvement, which would correspond to interictal abnormalities on previous EEGs  That being said, her events have not responded despite adequate doses of two medications  I would like to determine if her current events are indeed epileptic seizures, and if so, better localize the seizures to see if other treatments, such as epilepsy surgery could be an option for her  She did well on lamotrigine without any definitive seizures (except for a black out spell in 2011), until 2016, when she started having events again   See below for a description of her events, but these mainly manifest with staring, unresponsiveness, and possible automatisms  She has had increasing doses of lamotrigine, addition of Zonisamide, and has continued to have these events  ARRIVAL   Temperature Pulse Respirations Blood Pressure SpO2   05/22/18 0919 05/22/18 0919 05/22/18 0919 05/22/18 0919 05/22/18 0919   98 5 °F (36 9 °C) 77 18 112/59 100 %      Pain Score       05/22/18 1143       No Pain        Wt Readings from Last 1 Encounters:   05/22/18 68 9 kg (151 lb 14 4 oz)     1  Zonisamide 200 mg twice a day  2  Lamotrigine 300 mg in the morning and 400 mg at night  3  Calcium + Vitamin D  4  Pantoprazole 40 mg daily      Abnormal Labs/Diagnostic Test Results:    04/16/2018 10 8 2 0 - 20 0 ug/mL Final     04/16/2018 24 3 10 0 - 40 0 ug/mL Final             Past Medical/Surgical History: Active Ambulatory Problems     Diagnosis Date Noted    Localization-related focal epilepsy with complex partial seizures ([facility] ) 04/21/2017    [facility]ymicrogyria [address] ) 04/21/2017    History of craniotomy 07/20/2016    Essential hypertension 07/20/2016    GERD (gastroesophageal reflux disease) 03/20/2018       Past Medical History:    Hypertension     Seiz[facility] [address] )     Thyroid disease        Admitting Diagnosis: Localization-related (focal) (partial) symptomatic epilepsy and epileptic syndromes with complex partial seizures, not intractable, without status epilepticus [G40 209]    Age/Sex: 65 y o  female    Assessment/Plan:     1)  Spells/Seizures:   1) Will start continuous video EEG monitoring to capture and characterize events  If epileptic seizures, it would be helpful to localize seizures as part of a phase I presurgical evaluation  2) Medications: Will decrease her Zonisamide to  100 mg twice a day starting tonight  She will continue Lamotrigine 300 mg in the morning and 400 mg at night  With her prior history of status, we will need to be cautious with weaning her medications  3) Additional diagnostic tests:  None today   Will consider sleep deprivation, hyperventilation, and photic stimulation if no events captured  4) Seizure/fall/status epilepticus precautions  5) Will order Ativan 2 mg as needed for more than two complex partial seizures or 1 Generalized tonic clonic seizure in a 24 hour period       2)  Co morbidities:   1) Hypertension: currently not on medications  Will follow BP  2) GERD: will continue Pantoprazole  3) Hypothyroidism  She had a TSH in 4/17/2017 which was normal  She will continue to follow with her PCP        3) DVT prophylaxis: Lovenox 40 mg daily   SCDs while in bed    Admission Orders:    [address]      Scheduled Meds:   Current Facility-Administered Medications:  acetaminophen 650 mg Oral Q6H PRN   calcium carbonate-vitamin D 1 tablet Oral BID With Meals   diphenhydrAMINE 25 mg Oral Q6H PRN   enoxaparin 40 mg Subcutaneous Q24H   lamoTRIgine 300 mg Oral Daily   lamoTRIgine 400 mg Oral HS   LORazepam 2 mg Intravenous Q8H PRN   pantoprazole 40 mg Oral Early Morning   zonisamide 100 mg Oral BID     Continuous Infusions:    PRN Meds:   acetaminophen    diphenhydrAMINE    LORazepam

## 2018-05-23 NOTE — PLAN OF CARE
Knowledge Deficit     Patient/family/caregiver demonstrates understanding of disease process, treatment plan, medications, and discharge instructions Progressing        NEUROSENSORY - ADULT     Absence of seizures Progressing        SAFETY ADULT     Patient will remain free of falls Progressing

## 2018-05-23 NOTE — SOCIAL WORK
CM met with Pt with an introduction and explanation of role  Pt reported residing alone in a [facility] style home with no use of DME and 1 step to enter  Pt reported being independent with ADLs with no hx of VNA, SNF, mental health or drug/alcohol placement  Pt reported having a living will with her sister Zachariah White as POA  Pt reported using [employer] [address] [facility]  Pt reported Dr Jesenia Tan as PCP  CM reviewed d/c planning process including the following: identifying help at home, patient preference for d/c planning needs, Discharge Lounge, [facility] Meds to Bed program, availability of treatment team to discuss questions or concerns patient and/or family may have regarding understanding medications and recognizing signs and symptoms once discharged  CM also encouraged patient to follow up with all recommended appointments after discharge  Patient advised of importance for patient and family to participate in managing patients medical well being

## 2018-05-23 NOTE — PROGRESS NOTES
U Daily Progress Note   Rober Prescott 65 y o  female   : 1960  MRN: [id number]     Unit/Bed#: [facility] 718-01    Encounter: [id number]  -------------------------------------------------------------------------------------------------------------------  Interval History:  She has not had any spells since admission  She tolerated the decrease in her Zonisamide without any notable difference in how she feels  There has been no change in her cognition and she overall feels about the same      -------------------------------------------------------------------------------------------------------------------  Past Medical history, surgical history, family history, and social history are unchanged from admission H&P     -------------------------------------------------------------------------------------------------------------------  Allergies: No Known Allergies     Scheduled Meds:   Current Facility-Administered Medications:  acetaminophen 650 mg Oral Q6H PRN Romana Frank MD   calcium carbonate-vitamin D 1 tablet Oral BID With Meals Romana Frank MD   diphenhydrAMINE 25 mg Oral Q6H PRN Romana Frank MD   enoxaparin 40 mg Subcutaneous Q24H Romana Frank MD   lamoTRIgine 300 mg Oral Daily Romana Frank MD   lamoTRIgine 400 mg Oral HS Romana Frank MD   LORazepam 2 mg Intravenous Q8H PRN Romana Frank MD   pantoprazole 40 mg Oral Early Morning Romana Frakn MD   zonisamide 100 mg Oral BID Romana Frank MD     PRN Meds:   acetaminophen    diphenhydrAMINE    LORazepam  -------------------------------------------------------------------------------------------------------------------  Physical Exam:  Vitals: Blood pressure 117/60, pulse 79, temperature (!) 97 4 °F (36 3 °C), temperature source Oral, resp  rate 18, height 5' 6" (1 676 m), weight 68 9 kg (151 lb 14 4 oz), SpO2 99 %      Deferred today    -------------------------------------------------------------------------------------------------------------------  LAB & TEST RESULTS:  No new results  Please see EEG report documented in Epic      -------------------------------------------------------------------------------------------------------------------  Assessment/Plan: Arlene Juarez is a 65 y o  woman  with localization related epilepsy, 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, 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 who is admitted to the Epilepsy Monitoring Unit for evaluation of her events of unresponsiveness  Neurologic examination on admission was normal       She has not had any clinical events since admission  As noted before, the etiology of her events is no clear, and we will need to capture them on continuous video EEG in order to determine if they are epileptic or non-epileptic       1)  Spells/Seizures:   1) Will continue video EEG monitoring to capture and characterize events  If epileptic seizures, it would be helpful to localize seizures as part of a phase I presurgical evaluation  2) Medications: Will stop Zonisamide tonight  She will continue Lamotrigine 300 mg in the morning and 400 mg at night  With her prior history of status, we will need to be cautious with weaning her medications  3) Additional diagnostic tests:  None today  Will consider sleep deprivation, hyperventilation, and photic stimulation if no events captured  4) Seizure/fall/status epilepticus precautions  5) Will order Ativan 2 mg as needed for more than two complex partial seizures or 1 Generalized tonic clonic seizure in a 24 hour period       2)  Co morbidities:   1) Hypertension: currently not on medications  Will follow BP  2) GERD: will continue Pantoprazole  3) Hypothyroidism  She had a TSH in 4/17/2017 which was normal  She will continue to follow with her PCP        3) DVT prophylaxis: Lovenox 40 mg daily   SCDs while in bed    I spent 15 minutes with the patient and coordinating her care, specifically discussing her spells, side effects, and discussing/coordinating her care with the EMU team      -------------------------------------------------------------------------------------------------------------------  Marylouise Lombard, MD        Date: 5/23/2018     Time: 12:05 PM   [address] [facility] [place]

## 2018-05-24 ENCOUNTER — APPOINTMENT (INPATIENT)
Dept: NEUROLOGY | Facility: AMBULATORY SURGERY CENTER | Age: 58
DRG: 101 | End: 2018-05-24
Payer: COMMERCIAL

## 2018-05-24 PROCEDURE — 99231 SBSQ HOSP IP/OBS SF/LOW 25: CPT | Performed by: PSYCHIATRY & NEUROLOGY

## 2018-05-24 PROCEDURE — 95951 PR EEG MONITORING/VIDEORECORD: CPT | Performed by: PSYCHIATRY & NEUROLOGY

## 2018-05-24 PROCEDURE — 95951 HB EEG MONITORING/VIDEORECORD: CPT

## 2018-05-24 RX ORDER — LAMOTRIGINE 100 MG/1
200 TABLET ORAL 2 TIMES DAILY
Status: DISCONTINUED | OUTPATIENT
Start: 2018-05-24 | End: 2018-05-25

## 2018-05-24 RX ADMIN — CALCIUM CARBONATE 500 MG (1,250 MG)-VITAMIN D3 200 UNIT TABLET 1 TABLET: at 17:17

## 2018-05-24 RX ADMIN — PANTOPRAZOLE SODIUM 40 MG: 40 TABLET, DELAYED RELEASE ORAL at 05:19

## 2018-05-24 RX ADMIN — ENOXAPARIN SODIUM 40 MG: 40 INJECTION SUBCUTANEOUS at 12:30

## 2018-05-24 RX ADMIN — LAMOTRIGINE 200 MG: 100 TABLET ORAL at 17:17

## 2018-05-24 RX ADMIN — LAMOTRIGINE 300 MG: 100 TABLET ORAL at 08:14

## 2018-05-24 RX ADMIN — CALCIUM CARBONATE 500 MG (1,250 MG)-VITAMIN D3 200 UNIT TABLET 1 TABLET: at 08:14

## 2018-05-24 NOTE — PROGRESS NOTES
U Daily Progress Note   Searcy Sandifer Handlon 65 y o  female   : 1960  MRN: [id number]     Unit/Bed#: [facility] 718-01    Encounter: [id number]  -------------------------------------------------------------------------------------------------------------------  Interval History:  She still has not had any clinical events  She feels a little dizzy this morning, but otherwise has been doing okay  She does not feel significantly different since stopping Zonisamide  -------------------------------------------------------------------------------------------------------------------  Past Medical history, surgical history, family history, and social history are unchanged from admission H&P     -------------------------------------------------------------------------------------------------------------------  Allergies: No Known Allergies     Scheduled Meds:     Current Facility-Administered Medications:  acetaminophen 650 mg Oral Q6H PRN Isabela Almaraz MD   calcium carbonate-vitamin D 1 tablet Oral BID With Meals Isabela Almaraz MD   diphenhydrAMINE 25 mg Oral Q6H PRN Isabela Almaraz MD   enoxaparin 40 mg Subcutaneous Q24H Isabela Almaraz MD   lamoTRIgine 200 mg Oral BID Isabela Almaraz MD   LORazepam 2 mg Intravenous Q8H PRN Isabela Almaraz, MD   pantoprazole 40 mg Oral Early Morning Isabela Almaraz MD     PRN Meds:   acetaminophen    diphenhydrAMINE    LORazepam  -------------------------------------------------------------------------------------------------------------------  Physical Exam:  Vitals: Blood pressure 126/71, pulse 75, temperature 98 1 °F (36 7 °C), temperature source Oral, resp  rate 18, height 5' 6" (1 676 m), weight 68 9 kg (151 lb 14 4 oz), SpO2 98 %  Deferred today    -------------------------------------------------------------------------------------------------------------------  LAB & TEST RESULTS:  No new results  Please see EEG report documented in Epic  -------------------------------------------------------------------------------------------------------------------  Assessment/Plan: Miriam Strauss is a 65 y o  woman  with localization related epilepsy, 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, 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 who is admitted to the Epilepsy Monitoring Unit for evaluation of her events of unresponsiveness  Neurologic examination on admission was normal       She has not had any events since admission  It remains necessary to capture her events to better characterize them and determine if they are seizures  She has tolerated weaning off of Zonisamide  If she does not have any events by tonight, I will plan to wean down her Lamotrigine as well       1)  Spells/Seizures:   1) Will continue video EEG monitoring to capture and characterize events  If epileptic seizures, it would be helpful to localize seizures as part of a phase I presurgical evaluation  2) Medications: Continue to hold Zonisamide  If no events by this evening, will decrease her Lamotrigine to 200 mg twice a day  3) Additional diagnostic tests:  None today  Will consider sleep deprivation, hyperventilation, and photic stimulation if no events captured  4) Seizure/fall/status epilepticus precautions  5) Will order Ativan 2 mg as needed for more than two complex partial seizures or 1 Generalized tonic clonic seizure in a 24 hour period       2)  Co morbidities:   1) Hypertension: currently not on medications  Will follow BP  2) GERD: will continue Pantoprazole  3) Hypothyroidism  She had a TSH in 4/17/2017 which was normal  She will continue to follow with her PCP        3) DVT prophylaxis: Lovenox 40 mg daily   SCDs while in bed    I spent 15 minutes with the patient and coordinating her care, specifically discussing her spells, side effects, and discussing/coordinating her care with the EMU team  -------------------------------------------------------------------------------------------------------------------  Vasyl Buitrago MD        Date: 5/24/2018     Time: 2:59 PM   [facility]

## 2018-05-24 NOTE — SOCIAL WORK
MCG Guide Used for Initial Round: EEG, Video Monitoring RRG  Optimal GLOS: 2  Hospital Day: 2 days  DC Readiness:   Discharge Readiness  Return to top of EEG, Video Monitoring RRG - [address] [facility]  · Discharge readiness is indicated by patient meeting Recovery Milestones, including ALL of the following:  ? Hemodynamic stability  ? New or worsening neurologic deficits absent  ? Seizures absent or treated  ? Mental status at baseline  ? Ambulatory  ? Oral hydration, medications, and diet  ?  Discharge plans and education understood    Identified Barriers: Continue on Long term EEG monitoring  Discussion Date (Time): 05/24/18 - chart review

## 2018-05-25 ENCOUNTER — APPOINTMENT (INPATIENT)
Dept: NEUROLOGY | Facility: AMBULATORY SURGERY CENTER | Age: 58
DRG: 101 | End: 2018-05-25
Payer: COMMERCIAL

## 2018-05-25 PROCEDURE — 99231 SBSQ HOSP IP/OBS SF/LOW 25: CPT | Performed by: PSYCHIATRY & NEUROLOGY

## 2018-05-25 PROCEDURE — 95951 HB EEG MONITORING/VIDEORECORD: CPT

## 2018-05-25 PROCEDURE — 95951 PR EEG MONITORING/VIDEORECORD: CPT | Performed by: PSYCHIATRY & NEUROLOGY

## 2018-05-25 RX ORDER — LAMOTRIGINE 100 MG/1
100 TABLET ORAL 2 TIMES DAILY
Status: DISCONTINUED | OUTPATIENT
Start: 2018-05-25 | End: 2018-05-26

## 2018-05-25 RX ADMIN — CALCIUM CARBONATE 500 MG (1,250 MG)-VITAMIN D3 200 UNIT TABLET 1 TABLET: at 17:33

## 2018-05-25 RX ADMIN — ENOXAPARIN SODIUM 40 MG: 40 INJECTION SUBCUTANEOUS at 14:12

## 2018-05-25 RX ADMIN — LAMOTRIGINE 200 MG: 100 TABLET ORAL at 08:23

## 2018-05-25 RX ADMIN — LAMOTRIGINE 100 MG: 100 TABLET ORAL at 17:33

## 2018-05-25 RX ADMIN — PANTOPRAZOLE SODIUM 40 MG: 40 TABLET, DELAYED RELEASE ORAL at 06:09

## 2018-05-25 RX ADMIN — CALCIUM CARBONATE 500 MG (1,250 MG)-VITAMIN D3 200 UNIT TABLET 1 TABLET: at 08:23

## 2018-05-25 NOTE — PROGRESS NOTES
[facility] Daily Progress Note   Jamie Prescott 65 y o  female   : 1960  MRN: [id number]     Unit/Bed#: [facility] 718-01    Encounter: [id number]  -------------------------------------------------------------------------------------------------------------------  Interval History:  Still no clinical events  Starting last night, she feels like she has started to feel a little more cognitively clear, which corresponds mostly with decreasing her Zonisamide  No other complaints or issues today      -------------------------------------------------------------------------------------------------------------------  Past Medical history, surgical history, family history, and social history are unchanged from admission H&P     -------------------------------------------------------------------------------------------------------------------  Allergies: No Known Allergies     Scheduled Meds:     Current Facility-Administered Medications:  acetaminophen 650 mg Oral Q6H PRN Tucker Pham MD   calcium carbonate-vitamin D 1 tablet Oral BID With Meals Tucker Pham MD   diphenhydrAMINE 25 mg Oral Q6H PRN Merrilyn Pore, MD   enoxaparin 40 mg Subcutaneous Q24H Merrilysandra Pham, MD   lamoTRIgine 200 mg Oral BID Merrilyn Ankit, MD   LORazepam 2 mg Intravenous Q8H PRN Merrilyn Ankit, MD   pantoprazole 40 mg Oral Early Morning Merrilysandra Pham, MD     PRN Meds:   acetaminophen    diphenhydrAMINE    LORazepam  -------------------------------------------------------------------------------------------------------------------  Physical Exam:  Vitals: Blood pressure 118/59, pulse 62, temperature 97 5 °F (36 4 °C), temperature source Oral, resp  rate 16, height 5' 6" (1 676 m), weight 68 9 kg (151 lb 14 4 oz), SpO2 98 %  Deferred today    -------------------------------------------------------------------------------------------------------------------  LAB & TEST RESULTS:  No new results  Please see EEG report documented in Epic  -------------------------------------------------------------------------------------------------------------------  Assessment/Plan: Madeline Lock is a 65 y o  woman  with localization related epilepsy, 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, 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 who is admitted to the Epilepsy Monitoring Unit for evaluation of her events of unresponsiveness  Neurologic examination on admission was normal       She still has not had any events since admission  It remains necessary to capture her events to better characterize them and determine if they are seizures  She has tolerated weaning off of Zonisamide, and if anything she is more cognitively clear since this has been stopped  If she does not have any events by tonight, I will plan to decrease her Lamotrigine further       1)  Spells/Seizures:   1) Will continue video EEG monitoring to capture and characterize events  If epileptic seizures, it would be helpful to localize seizures as part of a phase I presurgical evaluation  2) Medications: Continue to hold Zonisamide  If no events by this evening, will decrease her Lamotrigine to 100 mg twice a day  3) Additional diagnostic tests:  will do photic stimulation and hyperventilation today  Will consider sleep deprivation  4) Seizure/fall/status epilepticus precautions  5) Will order Ativan 2 mg as needed for more than two complex partial seizures or 1 Generalized tonic clonic seizure in a 24 hour period       2)  Co morbidities:   1) Hypertension: currently not on medications  Will follow BP  2) GERD: will continue Pantoprazole  3) Hypothyroidism  She had a TSH in 4/17/2017 which was normal  She will continue to follow with her PCP        3) DVT prophylaxis: Lovenox 40 mg daily   SCDs while in bed    I spent 15 minutes with the patient and coordinating her care, specifically discussing her spells, side effects, and discussing/coordinating her care with the EMU team  -------------------------------------------------------------------------------------------------------------------  Melissa Cisneros MD        Date: 5/25/2018     Time: 11:49 AM   [address] [facility]

## 2018-05-26 ENCOUNTER — APPOINTMENT (INPATIENT)
Dept: NEUROLOGY | Facility: AMBULATORY SURGERY CENTER | Age: 58
DRG: 101 | End: 2018-05-26
Payer: COMMERCIAL

## 2018-05-26 PROCEDURE — 99232 SBSQ HOSP IP/OBS MODERATE 35: CPT | Performed by: PSYCHIATRY & NEUROLOGY

## 2018-05-26 PROCEDURE — 95951 HB EEG MONITORING/VIDEORECORD: CPT

## 2018-05-26 PROCEDURE — 95951 PR EEG MONITORING/VIDEORECORD: CPT | Performed by: PSYCHIATRY & NEUROLOGY

## 2018-05-26 RX ORDER — LAMOTRIGINE 100 MG/1
100 TABLET ORAL 2 TIMES DAILY
Status: DISCONTINUED | OUTPATIENT
Start: 2018-05-26 | End: 2018-05-27

## 2018-05-26 RX ADMIN — CALCIUM CARBONATE 500 MG (1,250 MG)-VITAMIN D3 200 UNIT TABLET 1 TABLET: at 16:27

## 2018-05-26 RX ADMIN — LAMOTRIGINE 100 MG: 100 TABLET ORAL at 08:43

## 2018-05-26 RX ADMIN — PANTOPRAZOLE SODIUM 40 MG: 40 TABLET, DELAYED RELEASE ORAL at 06:51

## 2018-05-26 RX ADMIN — ENOXAPARIN SODIUM 40 MG: 40 INJECTION SUBCUTANEOUS at 12:34

## 2018-05-26 RX ADMIN — LAMOTRIGINE 100 MG: 100 TABLET ORAL at 20:27

## 2018-05-26 RX ADMIN — LORAZEPAM 2 MG: 2 INJECTION INTRAMUSCULAR; INTRAVENOUS at 22:40

## 2018-05-26 RX ADMIN — CALCIUM CARBONATE 500 MG (1,250 MG)-VITAMIN D3 200 UNIT TABLET 1 TABLET: at 08:43

## 2018-05-26 NOTE — PLAN OF CARE
DISCHARGE PLANNING - CARE MANAGEMENT     Discharge to post-acute care or home with appropriate resources Progressing        Knowledge Deficit     Patient/family/caregiver demonstrates understanding of disease process, treatment plan, medications, and discharge instructions Progressing        NEUROSENSORY - ADULT     Absence of seizures Progressing        Potential for Falls     Patient will remain free of falls Progressing        SAFETY ADULT     Patient will remain free of falls Progressing

## 2018-05-26 NOTE — PROGRESS NOTES
Pt laying in bed comfortably  No complaints as of this time  Family at the bedside  Denies pain  neuro assessment done and charted   Will monitor

## 2018-05-26 NOTE — PROGRESS NOTES
U Daily Progress Note   Teresa Prescott 65 y o  female   : 1960  MRN: [id number]     Unit/Bed#: [facility] 718-01    Encounter: [id number]  -------------------------------------------------------------------------------------------------------------------  Interval History:  Still no typical clinical events  She has started to have some episodes of feeling "goofy" which often precede her typical larger events  -------------------------------------------------------------------------------------------------------------------  Past Medical history, surgical history, family history, and social history are unchanged from admission H&P     -------------------------------------------------------------------------------------------------------------------  Allergies: No Known Allergies     Scheduled Meds:     Current Facility-Administered Medications:  acetaminophen 650 mg Oral Q6H PRN Loulou Ward MD   calcium carbonate-vitamin D 1 tablet Oral BID With Meals Loulou Ward MD   diphenhydrAMINE 25 mg Oral Q6H PRN Loulou Cousins, MD   enoxaparin 40 mg Subcutaneous Q24H Loulou Cousins, MD   LORazepam 2 mg Intravenous Q8H PRN Loulou Cousins, MD   pantoprazole 40 mg Oral Early Morning Loulou Cousins, MD     PRN Meds:   acetaminophen    diphenhydrAMINE    LORazepam  -------------------------------------------------------------------------------------------------------------------  Physical Exam:  Vitals: Blood pressure 117/65, pulse 64, temperature 98 5 °F (36 9 °C), temperature source Oral, resp  rate 18, height 5' 6" (1 676 m), weight 68 9 kg (151 lb 14 4 oz), SpO2 100 %  Deferred today    -------------------------------------------------------------------------------------------------------------------  LAB & TEST RESULTS:  No new results  Please see EEG report documented in Epic  -------------------------------------------------------------------------------------------------------------------  Assessment/Plan: Manny Seals is a 65 y o  woman  with localization related epilepsy, 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, 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 who is admitted to the Epilepsy Monitoring Unit for evaluation of her events of unresponsiveness  Neurologic examination on admission was normal       She still has not had any events since admission  She has started to have some feelings of "goofy", which sometimes precede her larger events  This may suggest that she is closer to having a larger event  I spent time with her discussing potential options  If her events are seizures, we will proceed with a phase I presurgical evaluation (as previously noted), and will try to capture 3 seizures if possible        1)  Spells/Seizures:   1) Will continue video EEG monitoring to capture and characterize events  If epileptic seizures, it would be helpful to localize seizures as part of a phase I presurgical evaluation  2) Medications: Continue to hold Zonisamide  If no events by this evening, will stop lamotrigine  3) Additional diagnostic tests: Will consider sleep deprivation if no events off medications  4) Seizure/fall/status epilepticus precautions  5) Will order Ativan 2 mg as needed for more than two complex partial seizures or 1 Generalized tonic clonic seizure in a 24 hour period       2)  Co morbidities:   1) Hypertension: currently not on medications  Will follow BP  2) GERD: will continue Pantoprazole  3) Hypothyroidism  She had a TSH in 4/17/2017 which was normal  She will continue to follow with her PCP        3) DVT prophylaxis: Lovenox 40 mg daily   SCDs while in bed    I spent 25 minutes with the patient and coordinating her care, specifically discussing her spells, side effects, and discussing/coordinating her care with the EMU team  -------------------------------------------------------------------------------------------------------------------  Melissa Cisneros MD        Date: 5/26/2018     Time: 2:25 PM   [address] [facility]

## 2018-05-27 ENCOUNTER — APPOINTMENT (INPATIENT)
Dept: NEUROLOGY | Facility: AMBULATORY SURGERY CENTER | Age: 58
DRG: 101 | End: 2018-05-27
Payer: COMMERCIAL

## 2018-05-27 PROCEDURE — 99233 SBSQ HOSP IP/OBS HIGH 50: CPT | Performed by: PSYCHIATRY & NEUROLOGY

## 2018-05-27 PROCEDURE — 95951 HB EEG MONITORING/VIDEORECORD: CPT

## 2018-05-27 PROCEDURE — 95951 PR EEG MONITORING/VIDEORECORD: CPT | Performed by: PSYCHIATRY & NEUROLOGY

## 2018-05-27 RX ORDER — ZONISAMIDE 100 MG/1
200 CAPSULE ORAL ONCE
Status: COMPLETED | OUTPATIENT
Start: 2018-05-27 | End: 2018-05-27

## 2018-05-27 RX ORDER — LAMOTRIGINE 100 MG/1
300 TABLET ORAL ONCE
Status: COMPLETED | OUTPATIENT
Start: 2018-05-27 | End: 2018-05-27

## 2018-05-27 RX ORDER — ZONISAMIDE 100 MG/1
200 CAPSULE ORAL 2 TIMES DAILY
Status: DISCONTINUED | OUTPATIENT
Start: 2018-05-27 | End: 2018-05-28 | Stop reason: HOSPADM

## 2018-05-27 RX ORDER — LAMOTRIGINE 100 MG/1
400 TABLET ORAL
Status: DISCONTINUED | OUTPATIENT
Start: 2018-05-27 | End: 2018-05-28 | Stop reason: HOSPADM

## 2018-05-27 RX ORDER — LAMOTRIGINE 100 MG/1
300 TABLET ORAL DAILY
Status: DISCONTINUED | OUTPATIENT
Start: 2018-05-27 | End: 2018-05-28 | Stop reason: HOSPADM

## 2018-05-27 RX ADMIN — ZONISAMIDE 200 MG: 100 CAPSULE ORAL at 08:10

## 2018-05-27 RX ADMIN — ZONISAMIDE 200 MG: 100 CAPSULE ORAL at 16:49

## 2018-05-27 RX ADMIN — PANTOPRAZOLE SODIUM 40 MG: 40 TABLET, DELAYED RELEASE ORAL at 05:27

## 2018-05-27 RX ADMIN — CALCIUM CARBONATE 500 MG (1,250 MG)-VITAMIN D3 200 UNIT TABLET 1 TABLET: at 16:49

## 2018-05-27 RX ADMIN — CALCIUM CARBONATE 500 MG (1,250 MG)-VITAMIN D3 200 UNIT TABLET 1 TABLET: at 08:11

## 2018-05-27 RX ADMIN — LAMOTRIGINE 300 MG: 100 TABLET ORAL at 02:21

## 2018-05-27 RX ADMIN — LAMOTRIGINE 400 MG: 100 TABLET ORAL at 21:47

## 2018-05-27 RX ADMIN — ZONISAMIDE 200 MG: 100 CAPSULE ORAL at 02:23

## 2018-05-27 RX ADMIN — ENOXAPARIN SODIUM 40 MG: 40 INJECTION SUBCUTANEOUS at 12:47

## 2018-05-27 RX ADMIN — LAMOTRIGINE 300 MG: 100 TABLET ORAL at 08:14

## 2018-05-27 NOTE — PROGRESS NOTES
Per epileptologist, Amanda Bowers give patient PRN dose of 2mg ativan due to having seizure too frequent throughout the night  Will administer and continue to monitor

## 2018-05-27 NOTE — PROGRESS NOTES
Patient pressed button  Patient seemed slightly distraught  Patient was able to follow commands, pt stated name and was able to raise arms above head  Patient was told to remember an animal and color (purple, elephant)  Patient at this time is rubbing her body with hands  Patient then asked to remember 2 objects (cup, phone)  Patient then got out of bed, difficult to redirect  Patient eventually able to get back to bed  Patient then seemed to come back to baseline  Patient was unable to recall animal and color  Patient also unable to recall 2 objects  Patient stated she pressed the button because of "lightheadedness"  Will continue to monitor

## 2018-05-27 NOTE — PROGRESS NOTES
Patient button pressed [id number] via patient  Patient began to rub legs and stare blankly  Patient remained in bed with no attempt to stand up  Patient was unable to remember 2 objects, color, and animal  Event ended at 2230  Patient was back at baseline  Will continue to monitor

## 2018-05-27 NOTE — PROGRESS NOTES
RN pressed patient button at  patient sat up at side of bed rubbing legs  Patient was asked name and unable to respond staring blankly at RN  Patient was asked to raise arms above head, patient would not follow command  Patient was asked to remember "blue giraffe"  Patient also asked to remember 2 objects (phone, and marker)  Patient stood at side of bed with difficulty to redirect  Once back in bed patient began to remove venadyne leg pumps with difficulty redirection  Patient began to slowly revert back to baseline  Event ended 0159  Dr Conner Rolon made aware  No ativan to be administered at this time but will restart on seizure medications per Dr Conner Rolon  Will continue to monitor

## 2018-05-28 VITALS
DIASTOLIC BLOOD PRESSURE: 47 MMHG | TEMPERATURE: 98 F | SYSTOLIC BLOOD PRESSURE: 83 MMHG | OXYGEN SATURATION: 98 % | WEIGHT: 151.9 LBS | RESPIRATION RATE: 16 BRPM | BODY MASS INDEX: 24.41 KG/M2 | HEIGHT: 66 IN | HEART RATE: 67 BPM

## 2018-05-28 PROCEDURE — 99239 HOSP IP/OBS DSCHRG MGMT >30: CPT | Performed by: PSYCHIATRY & NEUROLOGY

## 2018-05-28 RX ORDER — LACOSAMIDE 100 MG/1
TABLET ORAL
Qty: 60 TABLET | Refills: 5 | Status: SHIPPED | OUTPATIENT
Start: 2018-05-28 | End: 2018-07-09 | Stop reason: SDUPTHER

## 2018-05-28 RX ADMIN — LAMOTRIGINE 300 MG: 100 TABLET ORAL at 07:54

## 2018-05-28 RX ADMIN — PANTOPRAZOLE SODIUM 40 MG: 40 TABLET, DELAYED RELEASE ORAL at 05:40

## 2018-05-28 RX ADMIN — CALCIUM CARBONATE 500 MG (1,250 MG)-VITAMIN D3 200 UNIT TABLET 1 TABLET: at 07:54

## 2018-05-28 RX ADMIN — ZONISAMIDE 200 MG: 100 CAPSULE ORAL at 07:54

## 2018-05-28 NOTE — PROGRESS NOTES
[facility] Daily Progress Note   Rosi Bridges 65 y o  female   : 1960  MRN: [id number]     Unit/Bed#: [facility] 718-01    Encounter: [id number]  -------------------------------------------------------------------------------------------------------------------  Interval History:  She had three typical seizures yesterday of staring, bilateral hand automatisms, and retained ability to speak  After her second seizure, she did receive IV lorazepam, but still had a third seizure, after which, her medications were restarted  Today, she feels good  She does not have any complaints and has tolerated the reinstitution of her seizure medications  She does not remember what happened with her seizures  Mervat Loyola       -------------------------------------------------------------------------------------------------------------------  Past Medical history, surgical history, family history, and social history are unchanged from admission H&P     -------------------------------------------------------------------------------------------------------------------  Allergies: No Known Allergies     Scheduled Meds:     Current Facility-Administered Medications:  acetaminophen 650 mg Oral Q6H PRN Rebeka Momin MD   calcium carbonate-vitamin D 1 tablet Oral BID With Meals Rebeka Momin MD   diphenhydrAMINE 25 mg Oral Q6H PRN Rebeka Momin MD   enoxaparin 40 mg Subcutaneous Q24H Rebeka Momin MD   lamoTRIgine 300 mg Oral Daily Rebeka Momin MD   And       lamoTRIgine 400 mg Oral HS Rebeka Momin MD   LORazepam 2 mg Intravenous Q8H PRN Rebeka Momin MD   pantoprazole 40 mg Oral Early Morning Rebeka Momin MD   zonisamide 200 mg Oral BID Rebeka Momin MD     PRN Meds:   acetaminophen    diphenhydrAMINE    LORazepam  -------------------------------------------------------------------------------------------------------------------  Physical Exam:  Vitals: Blood pressure 110/62, pulse 70, temperature (!) 97 3 °F (36 3 °C), temperature source Oral, resp  rate 16, height 5' 6" (1 676 m), weight 68 9 kg (151 lb 14 4 oz), SpO2 97 %  Deferred today    -------------------------------------------------------------------------------------------------------------------  LAB & TEST RESULTS:  No new results  Please see EEG report documented in Epic      -------------------------------------------------------------------------------------------------------------------  Assessment/Plan: Kal Dixon is a 65 y o  woman  with localization related epilepsy, 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, 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 who is admitted to the Epilepsy Monitoring Unit for evaluation of her events of unresponsiveness  Neurologic examination on admission was normal       All of her seizures were stereotyped and appear to have arisen from her right temporal area  We have been able to confirm that her events are indeed epileptic seizures  We will need to continue to work with medications to try to control her seizures  She has not responded to her current medications, and appears to have side effects with Zonisamide of cognitive cloudiness  Since she has contineud to have seizures, I will plan to start a new medication, likely Lacosamide on discharge  I also discussed the possibility of epilepsy surgery  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  Her seizures appear to be arising from the similar area as her prior resection, but any further surgery will likely require intracranial EEG to fully localize her seizure onset  We will need to get a full set of tests as part of a phase I presurgical evaluation         1)  Spells/Seizures:   1) Will continue video EEG monitoring to capture additional events  2) Medications: Medicines were restarted this morning  Will continue Zonisamide and Lamotrigine and plan to start Lacosamide after discharge  3) Additional diagnostic tests:  None     4) Seizure/fall/status epilepticus precautions  5) Will order Ativan 2 mg as needed for more than two complex partial seizures or 1 Generalized tonic clonic seizure in a 24 hour period       2)  Co morbidities:   1) Hypertension: currently not on medications  Will follow BP  2) GERD: will continue Pantoprazole  3) Hypothyroidism  She had a TSH in 4/17/2017 which was normal  She will continue to follow with her PCP        3) DVT prophylaxis: Lovenox 40 mg daily   SCDs while in bed    I spent 35 minutes with the patient and coordinating her care, specifically discussing her spells, side effects, and discussing/coordinating her care with the EMU team      -------------------------------------------------------------------------------------------------------------------  Tiff Desir MD        Date: 5/27/2018     Time: 9:32 PM   [address] [facility]

## 2018-05-28 NOTE — DISCHARGE SUMMARY
Epilepsy Attending Discharge Summary  Guillermo Rothman 65 y o  female   : 1960  MRN: [id number]     [facility]    Encounter: [id number]    Date of Admission:   2018  Date of Discharge: 2018   Attending on Admission: Dr Alvaro Mcdowell  Attending on Discharge: Dr Alvaro Mcdowell    Admission Diagnosis:    1  Localization-related (focal) (partial) symptomatic epilepsy and epileptic syndromes with complex partial seizures, not intractable, without status epilepticus [G40 209]    Discharge Diagnosis:  1  Complex partial epilepsy intractable with status  G40 211    Secondary Diagnoses:  1  Hypertension  2  GERD  3  Hypothyroidism    Consultations:  None    Procedures:  1  Continuous Video EEG    Disposition:  Discharge to Home    Follow-up Appointments/Referrals:  1  Follow-up with [facility] in about 2 months as previously scheduled  Medication Changes:  1  Will start Lacosamide 50 mg twice a day for 1 week, then 100 mg twice a day  2  When at goal dose of Lacosamide, will decrease Zonisamide to 100 mg in the am and 200 mg in the pm      Discharge Medications:  See after visit summary for reconciled discharge medications provided to patient and family  Recommended follow-up testin  Neuropsychologic testing for phase I presurgical evaluation  2  Will consider PET brain and 3 Hannah MRI at follow up  Brief History:  Per admission H&P:   Guillermo Rothman is a 65 y o   female with localization related epilepsy, prior right frontal mass s/p resection in , 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 who is admitted to the Epilepsy Monitoring Unit for evaluation of her events of unresponsiveness       Briefly reviewing her history, she had her first seizure around the age of 14years old, which manifested as unresponsiveness and whole body shaking  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   She was treated with Phenytoin, but continued to have some seizures in the 1980s, eventually being found to have a right frontal mass, which was resected around 1987  She continued on phenytoin since that time and was seizure free for at least 30 years  In 2007, her phenytoin was changed to Levetiracetam due to risk of side effects, but she didn't tolerate this due to behavioral changes, so she was started on lamotrigine  She did well on lamotrigine without any definitive seizures (except for a black out spell in 2011), until 2016, when she started having events again  See below for a description of her events, but these mainly manifest with staring, unresponsiveness, and possible automatisms  She has had increasing doses of lamotrigine, addition of Zonisamide, and has continued to have these events       She previously was having some cognitive changes with the addition of Zonisamide  She currently takes her medications in the morning with food and states that she feels fine with taking it, however also states that she in general feels cognitively less sharp than she had in the past      Hospital Course: Marissa Mederos was admitted to the epilepsy monitoring unit and monitored on continuous video EEG  The first night of her admission, her zonisamide was decreased, and she was gradually weaned off of zonisamide in her lamotrigine was decreased to 100 mg twice a day  When coming off of zonisamide, she did feel cognitively clear  After decreasing her lamotrigine to the above dose, she did have 3 focal unaware seizures (complex partial seizures) with bilateral hand automatisms, initial retained ability to speak, which all appear to arise from the right hemisphere, possibly the right temporal lobe  Because of her history of status, she did receive a dose of lorazepam 2 mg after her second seizure, and after her third seizure, which occurred within a 12 hr period of time, her home medications were restarted    She did not have any further seizures, and felt well and at her baseline, being able to be discharged the following day  Activation procedures:  1  Medication tapering  2  Photic stimulation  3  Hyperventilation    EMU Conclusion  Summary interictal findings: Intermittent right mid-temporal delta activities suggests an underlying area of neuronal dysfunction  Right mid-temporal higher amplitudes and focal beta activities suggests an underlying breach rhythm    Summary event findings: 3 stereotyped focal unaware seizures (complex partial seizures) with bilateral hand automatisms were captured, all with possible right hemisphere onset (but some with initial bilateral changes)  Seizure Classification: focal unaware seizures (complex partial seizures) with bilateral hand automatisms    Epilepsy Classification: Localization related epilepsy, intractable, with prior status epilepticus  EMU findings and discussion:  During her admission, we were able to capture her typical events, which were consistent with epileptic seizures  All 3 of her events were stereotyped, and appeared to have arisen from the right hemisphere, possibly the right temporal lobe  Exact EEG onset was a little unclear, as she did have some initial bilateral frontal changes  This semiology of her event was suggestive of temporal lobe onset, but with circling movements of her arms, would raise the possibility of frontal onset  Overall, knowing that her events are epileptic seizures, we will need to continue to work with medications to try to get them under better control  She does have some evidence of some cognitive side effects from zonisamide, so I think would be best to change this medication to alternative medication  I will have her start taking Vimpat 50 mg twice a day for 1 week and take 100 mg twice a day  She will then decrease her zonisamide to be 100 mg in the morning and 200 mg at night  Upon follow-up, we will work to fully transition over to [employer] [place] and off of zonisamide    Discussed the risks and rationale of starting this new medication including the potential side effects  In regard to the possibility of epilepsy surgery, she does have a history of a 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  It is likely that her seizures are arising from this area, but is unclear if they are arising from the frontal or temporal lobe  Repeat MRI in July 2016 did not show any evidence of a tumor recurrence, just post surgical changes consistent with her prior surgery  Given her prior history, she will need additional tests with a 3 jean-paul MRI, PET scan of her brain, neuropsychologic testing at a minimum, and may need intracranial EEG to better localize her seizure onset zone  She may also benefit from an ictal SPECT if at all possible  We will continue to get these tests as an outpatient to complete her phase 1 presurgical evaluation  Diet:  regular    Activity:  As tolerated    Restrictions:  Unable to drive until seizure free for 6 months  Discharge Statement   I spent 35 minutes discharging the patient  This time was spent on the day of discharge  I had direct contact with the patient on the day of discharge  We specifically discussed medication changes, possibility of epilepsy surgery, and ongoing management and tests as noted above       Da Jones MD   Date: 5/28/2018

## 2018-05-28 NOTE — PLAN OF CARE
DISCHARGE PLANNING - CARE MANAGEMENT     Discharge to post-acute care or home with appropriate resources Adequate for Discharge        Knowledge Deficit     Patient/family/caregiver demonstrates understanding of disease process, treatment plan, medications, and discharge instructions Adequate for Discharge        NEUROSENSORY - ADULT     Absence of seizures Adequate for Discharge        Potential for Falls     Patient will remain free of falls Adequate for Discharge        SAFETY ADULT     Patient will remain free of falls Adequate for Discharge

## 2018-05-28 NOTE — DISCHARGE INSTRUCTIONS
CONCLUSION  During your admission to the Epilepsy monitoring unit, we were able to catch and better characterize your seizures, which appear to arise from your right temporal area  Following your admission to the epilepsy monitoring unit, your medications were changed  Please follow the medication instructions below:    -- Please start taking Lacosamide 50 mg twice a day  -- After 1 week, increase Lacosamide to 100 mg twice a day and decrease your Zonisamide to 100 mg in the morning and 200 mg at night  It is very important that you follow all medication schedule instructions to allow your body adequate time to adjust to the medications      -------------------------------------------------------------------------------------------------------------------    For more information about seizures and epilepsy, you can visit the web pages for: [address]: www epilepsy  com   [address] [place] [address] Fl:  www efepa org    -------------------------------------------------------------------------------------------------------------------  FOLLOW UP  You will be following up with your Neurologist at [address]  If you have any problems with your medications, please call (available during the day Monday through Friday):  [phone]      -------------------------------------------------------------------------------------------------------------------    SEIZURE/SPELL PRECAUTIONS  · It is [facility] that you not drive for the next 6 months after you have a seizure, episode of loss of consciousness or episode of confusion  · Please do not take baths, swim in open water, climb heights, or operate heavy machinery  These activities put you, and possibly others, at risk if you were to have a seizure or episode of loss of consciousness    · To decrease your chance of having more seizures or spells please remember to take your medications every day, get plenty of sleep, and abstain from alcohol and drugs    -------------------------------------------------------------------------------------------------------------------    FIRST AID FOR SEIZURES  ? What to Do If You Witness a Seizure:   1  Stay calm   2  Prevent injury  Move objects away that the person might hit while jerking uncontrollably  3  Time when the seizure starts and ends  4  Turn him or her gently onto one side  This will help keep the airway clear  5  Never place anything in his/her mouth or give him/her anything by mouth during a seizure  - Do not give the person water, pills, or food until fully alert  6  Loosen tight clothing or jewelry around his/her neck   7  Make the person as comfortable as possible  8  Place something soft under their head  9  Do not hold the person down  If the person having a seizure thrashes around there is no need for you to restrain them  They are more likely to be combative if restrained  Remember to consider your safety as well  10  Keep onlookers away  11  Be sensitive and supportive, and ask others to do the same       12  If the jerking seizure activity does not stop after 5 or 10 minutes, or they have multiple seizures without returning to normal, then CALL [phone]

## 2018-05-29 ENCOUNTER — TELEPHONE (OUTPATIENT)
Dept: NEUROLOGY | Facility: CLINIC | Age: 58
End: 2018-05-29

## 2018-05-29 NOTE — TELEPHONE ENCOUNTER
Patient is requesting a letter to go back to work she states she is already back to work today, but her work is requesting it  Also, she wants to know if she needs a sooner appt than 8/1  Will mail neuropsych referral to patient as requested        PA required for vimpat  Submitted through [facility]   ID [id number]   BIN [id number]  PCN [id number]  GROUP [id number]  [id number]  - PA Case ID: [id number]     PA  Approved

## 2018-05-30 ENCOUNTER — DOCUMENTATION (OUTPATIENT)
Dept: NEUROLOGY | Facility: CLINIC | Age: 58
End: 2018-05-30

## 2018-05-30 NOTE — LETTER
May 30, 2018     Patient: Garret Shrestha   YOB: 1960   Date of Visit: 5/30/2018       To Whom it May Concern: Juana Nunez is under my professional care  She may return to work on 5/29/18  If you have any questions or concerns, please don't hesitate to call           Sincerely,          Dr Renate Srinivasan          CC: No Recipients

## 2018-05-30 NOTE — TELEPHONE ENCOUNTER
Patient scheduled for 7/9  Can you please review the letter before I send to patient's work  Thanks    [employer] to [phone]

## 2018-06-15 ENCOUNTER — TELEPHONE (OUTPATIENT)
Dept: NEUROLOGY | Facility: CLINIC | Age: 58
End: 2018-06-15

## 2018-06-15 NOTE — TELEPHONE ENCOUNTER
vimpat started after EMU admission  she states that since she increased to 100mg bid she feels off, feels like she is not walking in a straight line and double vision  all symptoms go away after about 2 hours  then comes back after supper, last about 2 hours again but not as strong  she gets these symptoms after taking her meds  starts 1/2-1 hour after taking meds  she just had someone watch her walk and she is   walking in a staright line  but in her mind doesn't feel that she could operate heavy equipment or drive  she did not had a little bit of this feeling  when taking 50mg bid  has been taking vimpat 100mg bid for 1 1/2 weeks  currently taking vimpat 100mg bid  zonisamide 100mg in am and 200mg pm    lamictal 300mg am and 400mg pm  xanax 0 25mg 1/2 tab am and pm-presceribed by pcp for anxiety  no seizures,   [phone]     She also states that her sister brought paper copies of all of her records since 1977 to [place] office  These are under media dated 6/12

## 2018-06-15 NOTE — TELEPHONE ENCOUNTER
She appears to be having peak dose side effects since getting to the higher dose of Lacosamide  Lacosamide and Lamotrigine both work at different parts of sodium channels in her brain, so she likely has too much sodium blockade  I would have her decrease her Lamotrigine to 300 mg twice a day and take her medications with food  This should get rid of her symptoms and allow us to use the higher doses of Lacosamide to help her seizures  If she continues to have these symptoms after we decrease the Lamotrigine, we can decrease the dose even further  Lamotrigine was not working even at these higher doses, so it will be important to try to get her onto good doses of the Lacosamide to try to stop her seizures

## 2018-07-09 ENCOUNTER — OFFICE VISIT (OUTPATIENT)
Dept: NEUROLOGY | Facility: CLINIC | Age: 58
End: 2018-07-09
Payer: COMMERCIAL

## 2018-07-09 VITALS
HEART RATE: 69 BPM | SYSTOLIC BLOOD PRESSURE: 152 MMHG | HEIGHT: 66 IN | WEIGHT: 156 LBS | BODY MASS INDEX: 25.07 KG/M2 | DIASTOLIC BLOOD PRESSURE: 73 MMHG

## 2018-07-09 DIAGNOSIS — G40.209 LOCALIZATION-RELATED FOCAL EPILEPSY WITH COMPLEX PARTIAL SEIZURES (HCC): ICD-10-CM

## 2018-07-09 DIAGNOSIS — Q04.3 POLYMICROGYRIA (HCC): ICD-10-CM

## 2018-07-09 DIAGNOSIS — Z98.890 HISTORY OF CRANIOTOMY: Primary | ICD-10-CM

## 2018-07-09 PROCEDURE — 99214 OFFICE O/P EST MOD 30 MIN: CPT | Performed by: PSYCHIATRY & NEUROLOGY

## 2018-07-09 RX ORDER — ALPRAZOLAM 0.25 MG/1
0.12 TABLET ORAL
COMMUNITY
Start: 2018-07-09 | End: 2020-09-22

## 2018-07-09 RX ORDER — LACOSAMIDE 150 MG/1
TABLET ORAL
Qty: 180 EACH | Refills: 1 | Status: SHIPPED | OUTPATIENT
Start: 2018-07-09 | End: 2018-07-11 | Stop reason: SDUPTHER

## 2018-07-09 RX ORDER — ZONISAMIDE 100 MG/1
CAPSULE ORAL
Qty: 270 CAPSULE | Refills: 3 | Status: SHIPPED | OUTPATIENT
Start: 2018-07-09 | End: 2019-04-24 | Stop reason: SDUPTHER

## 2018-07-09 RX ORDER — LAMOTRIGINE 200 MG/1
TABLET ORAL
Qty: 225 TABLET | Refills: 3 | Status: SHIPPED | OUTPATIENT
Start: 2018-07-09 | End: 2019-01-22 | Stop reason: SDUPTHER

## 2018-07-09 NOTE — PROGRESS NOTES
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" (based on patholothy report scanned into Epic), who is returning to Neurology office for follow up of her seizures  Assessment/Plan:    Localization-related focal epilepsy with complex partial seizures ([facility] [address] )  She has not had any seizures since leaving the hospital and with starting Lacosamide  Her peak dose side effects have improved with decreasing her lamotrigine  I suspect that because both lamotrigine and lacosamide work at sodium channels, that she was experiencing excessive sodium channel blockade from both medications  By decreasing her Lamotrigine, this will allow her to be on higher amounts of Lacosamide  She is still having some minor symptoms of double vision  I will have her increase her Lacosamide a little further, but also decrease her lamotrigine to avoid peak dose effects  I did learn more about her seizures, as noted in the HPI, but it appears she has been having seizures essentially her entire adult life that were not recognized  She has failed multiple medications, and would like to continue to look into the possibility of epilepsy surgery  Her seizures appear to be arising from the right hemisphere, likely in the region of her prior resection  She will need additional tests to finish her Phase I presurgical evaluation, but given her prior temporal lobectomy, she will likely require intracranial EEG to better localize her seizures  -- She will increase her Lacosamide to 150 mg twice a day  She will also decrease her Lamotrigine to 200 mg in the am and 300 mg in the pm  She will continue Zonisamide unchanged (100 mg in the am and 200 in the pm)       -- I will have her get a PET scan of her brain to look for source of seizure  I also will have her get a 3T MRI of her brain to better evaluate her prior resection and look for areas that could be sources of her seizures  -- Additionally, I will have her get Neuropsychologic testing for presurgical evaluation  It is possible this may be done at [address]  I spent a total of 25 min with the patient with greater than 50% of that time spent counseling and coordinating her care, specifically discussing her diagnosis, medication changes, and presurgical evaluation, as detailed above     She will return to the office in about 3 months  Subjective:    HPI  Current seizure medications:  1  Lamotrigine 300 mg twice a day  2  Lacosamide 100 mg twice a day  3  Zonisamide 200 mg in the am and 300 mg in the pm    Other medications as per Epic  I last saw her in the office on 4/17/2018  At that time, she was still having seizures, so she was arranged to be admitted to the Epilepsy Monitoring Unit to better characterize and localize her seizures  She was admitted to the EMU from 5/22-5/28/2018  During that time, she had 3 typical complex partial seizures  See her D/C summary for details, but all appeared to be coming from her right hemisphere  She was started on Lacosamide at discharge, and planned to decrease her Zonisamide dose  Since her hospitalization, she has not had any additional seizures  She called into the office because she was having some peak dose side effects after starting lacosamide of double vision and gait imbalance  She was asked to decrease her Lamotrigine to allow for higher doses of Lacosamide  After decreasing her Lamotrigine to the above dose, her main side effects have resolved, and she only experiences minor, brief double vision occasionally in the mornings  She has otherwise been doing well and denies any side effects       I did review outside records that she was able to obtain from her more distant history  It appears that in 1977, she had an event that was felt to be due to encephalitis, and she started to have seizures  She was treated with Dilantin, and was doing well, but in the 1980s, was found to have a right temporal cystic lesion, that was resected  She was continued on Dilantin for a very long time thereafter  Although notes report that she was seizure free, in retrospect, after her EMU admission and knowing better what happened with her seizure, she and her sister acknowledge that she has been having complex partial seizures all along  Every few months, her friends and family would note that she would start rubbing her legs, stare off, and at times be unresponsive briefly  These were assumed to just me Norris Ahumada being Norris Ahumada and were not of any concern  She wasn't aware of most of these events and they were not aware they were seizures, so they didn't report that they were occurring over the years  Prior Medications: Phentoin (changed due to potential long-term side effects), Levetiracetam (behavioral changes)    Her history was also obtained from her sister, who was present at today's visit  The following portions of the patient's history were reviewed and updated as appropriate: allergies, current medications and problem list      Objective:    Blood pressure 152/73, pulse 69, height 5' 6" (1 676 m), weight 70 8 kg (156 lb)  Physical Exam    Neurological Exam      ROS:    Review of Systems   Constitutional: Positive for fatigue  HENT: Negative  Eyes: Negative  Respiratory: Negative  Cardiovascular: Negative  Gastrointestinal: Positive for constipation  Endocrine: Negative  Genitourinary: Negative  Musculoskeletal: Negative  Skin: Negative  Allergic/Immunologic: Negative  Neurological: Positive for light-headedness (little)  Hematological: Bruises/bleeds easily  Psychiatric/Behavioral: The patient is nervous/anxious          I personally reviewed the ROS that was entered by the medical assistant

## 2018-07-09 NOTE — PATIENT INSTRUCTIONS
-- Continued to take zonisamide 100 mg in the morning and 200 mg at night  -- increase Vimpat to 150 mg twice a day, and decrease lamotrigine to 200 mg in the morning and 300 mg at night   -- Please get an MRI of her brain on a 3 T MRI  We will help to arrange this  -- Please get a PET scan of your brain  We will also be in touch about scheduling neuropsychologic testing

## 2018-07-10 ENCOUNTER — DOCUMENTATION (OUTPATIENT)
Dept: NEUROLOGY | Facility: CLINIC | Age: 58
End: 2018-07-10

## 2018-07-10 DIAGNOSIS — G40.209 LOCALIZATION-RELATED FOCAL EPILEPSY WITH COMPLEX PARTIAL SEIZURES (HCC): Primary | ICD-10-CM

## 2018-07-10 PROBLEM — Q04.3 POLYMICROGYRIA (HCC): Status: RESOLVED | Noted: 2017-04-21 | Resolved: 2018-07-10

## 2018-07-10 NOTE — PROGRESS NOTES
Pet Scan was ordered by Dilip Dang on 7/9/2018  Forms  filled out and  Fax to [phone] and to CF

## 2018-07-10 NOTE — ASSESSMENT & PLAN NOTE
She has not had any seizures since leaving the hospital and with starting Lacosamide  Her peak dose side effects have improved with decreasing her lamotrigine  I suspect that because both lamotrigine and lacosamide work at sodium channels, that she was experiencing excessive sodium channel blockade from both medications  By decreasing her Lamotrigine, this will allow her to be on higher amounts of Lacosamide  She is still having some minor symptoms of double vision  I will have her increase her Lacosamide a little further, but also decrease her lamotrigine to avoid peak dose effects  I did learn more about her seizures, as noted in the HPI, but it appears she has been having seizures essentially her entire adult life that were not recognized  She has failed multiple medications, and would like to continue to look into the possibility of epilepsy surgery  Her seizures appear to be arising from the right hemisphere, likely in the region of her prior resection  She will need additional tests to finish her Phase I presurgical evaluation, but given her prior temporal lobectomy, she will likely require intracranial EEG to better localize her seizures  -- She will increase her Lacosamide to 150 mg twice a day  She will also decrease her Lamotrigine to 200 mg in the am and 300 mg in the pm  She will continue Zonisamide unchanged (100 mg in the am and 200 in the pm)  -- I will have her get a PET scan of her brain to look for source of seizure  I also will have her get a 3T MRI of her brain to better evaluate her prior resection and look for areas that could be sources of her seizures  -- Additionally, I will have her get Neuropsychologic testing for presurgical evaluation  It is possible this may be done at [address] [place] [address]

## 2018-07-11 ENCOUNTER — TELEPHONE (OUTPATIENT)
Dept: NEUROLOGY | Facility: CLINIC | Age: 58
End: 2018-07-11

## 2018-07-11 DIAGNOSIS — G40.209 LOCALIZATION-RELATED FOCAL EPILEPSY WITH COMPLEX PARTIAL SEIZURES (HCC): ICD-10-CM

## 2018-07-11 RX ORDER — LACOSAMIDE 150 MG/1
150 TABLET ORAL EVERY 12 HOURS SCHEDULED
Qty: 180 EACH | Refills: 1 | Status: SHIPPED | OUTPATIENT
Start: 2018-07-11 | End: 2018-07-12 | Stop reason: SDUPTHER

## 2018-07-11 NOTE — TELEPHONE ENCOUNTER
I would like her to increase her Vimpat to 150 mg twice a day  It would appear although I specified 150 mg twice a day, Epic also included my prior instructions from an old Rx for Vimpat  I corrected the prescription and resent it to [facility]  Please confirm that they do not need any additional information  Thanks!

## 2018-07-11 NOTE — TELEPHONE ENCOUNTER
Pharmacy requesting clarification on vimpat rx sent 7/9  The directions are to take 50 mg BID then 100 mg BID but the rx is for 150 mg  Can you please clarify this  Reference # [id number]  [phone]  [facility] Mail order

## 2018-07-12 RX ORDER — LACOSAMIDE 100 MG/1
150 TABLET ORAL EVERY 12 HOURS SCHEDULED
Qty: 240 TABLET | Refills: 3 | Status: SHIPPED | OUTPATIENT
Start: 2018-07-12 | End: 2018-11-12 | Stop reason: SDUPTHER

## 2018-07-12 NOTE — TELEPHONE ENCOUNTER
I sent the prescription  They need to be aware that 240 tablets will not be a full 3 months worth of medication, so she will need to get her refills at a sooner interval than 3 months  Vimpat can indeed be broken, so she can take 1 5 tabs twice a day to get to the dose of 150 mg twice a day  I have no objections to her restarting Peanut butter (PB) in the mornings  To be clear, PB, also is an abbreviation for Phenobarbital, which she does NOT take

## 2018-07-12 NOTE — TELEPHONE ENCOUNTER
I spoke w/Parish @[facility] [phone]  vimpat does not come in 150mg tabs from mail order  Insurance does not cover 50mg tabs, 3tabs bid b/c the quantity is too high, it is too expensive    Ins will pay for 100mg tabs, 1 5 tabs bid, 240tabs (80day supply) w/1 refill  This pill is supplied in 60tab packs so it can only be shipped @240 quantity  This pill is not scored but [facility] looked up the med & it does not say that you cannot break  I did call pt to let her know she will need a pill cutter for these as there is no score francie & to provide updated instructions    Pt stopped eating PB in the am after visit on 7/9 but states that the double vision came back, pt is going back to the PB in the am

## 2018-07-16 NOTE — TELEPHONE ENCOUNTER
Pt starting eating peanut butter in the am & she was great over the weekend, no double vision  Pt state she has horrible double vision & dizziness today, feels, like she is veering to the left this am while walking  pt is concerned that this will affect her neuropsych appt tomorrow    Nikkie Sales

## 2018-07-17 ENCOUNTER — OFFICE VISIT (OUTPATIENT)
Dept: NEUROLOGY | Facility: CLINIC | Age: 58
End: 2018-07-17

## 2018-07-17 DIAGNOSIS — G40.209 LOCALIZATION-RELATED FOCAL EPILEPSY WITH COMPLEX PARTIAL SEIZURES (HCC): Primary | ICD-10-CM

## 2018-07-17 DIAGNOSIS — Z98.890 HISTORY OF CRANIOTOMY: ICD-10-CM

## 2018-07-17 DIAGNOSIS — R41.89 COGNITIVE CHANGES: ICD-10-CM

## 2018-07-17 PROBLEM — F43.21 ADJUSTMENT DISORDER WITH DEPRESSED MOOD: Status: ACTIVE | Noted: 2018-07-17

## 2018-07-17 NOTE — TELEPHONE ENCOUNTER
Received vm from pt this am, pt staes she was taking too much lamictal in the am & to please disregard previous msg re am symptoms, see below  Pt was taking 300mg am instead of 200mg, feeling much better today

## 2018-07-17 NOTE — PROGRESS NOTES
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 " The patient  was referred for a neuropsychological consultation by her epileptologist, Dr Marylouise Lombard, for a presurgical characterization of her neurocognitive and emotional functioning as part of her epilepsy surgery candidacy evaluation  TESTS ADMINISTERED: [employer] - Test of Premorbid Functioning (TOPF); Etlon & Elton (BNT); Brief Visuospatial Memory Test-Revised (BVMT-R); Category Fluency (animals); [place] Auditory [employer]; Controlled Oral Word Association Test (COWAT); Finger Oscillation Test; Grooved Pegboard; Judgment of Line Orientation (ANGEL LUIS); Personality Assessment Inventory (JONATHON); Jesus Auditory Verbal Learning Test (RAVLT); Jesus Complex Figure Test (RCFT); Ruff Figural Fluency Test (RFFT); Stroop Color Word Test (SCWT); TOMM;Trail Making Test (TMT); Wechsler Adult Intelligence Scale-Fourth Edition (WAIS-IV); Wechsler Memory Scale-Fourth Edition (WMS-IV; Select Subtests); [facility] Edition (WCST-64)  Written consent was obtained prior to the evaluation (scanned and stored in the patients electronic and paper charts)  [Total licensed billing psychologists professional time including clinical interview; test selection, administration and scoring; interpretation of tests administered; integration of test results and other clinical data, and preparing final report = (61753: 9 hours)]       PRESENTING CONCERNS and RELEVANT HISTORY: The following information was obtained through a clinical interview with the patient and her sister, Jewel Montenegro, as well as through review of available medical records  Ms Emerald Browne seizure onset was at age 14, which she is able to recall in vivid detail  At that time, she was diagnosed with encephalitis and hospitalized for about a week and treatment with Dilantin was initiated  She remained seizure-free until 1985, when she had her second seizure  During the next two years, Ms Garland Ricks had a few seizures, but in 1987 the frequency began to increase  It was at that time when she was found to have a right frontal mass and shortly after she underwent a craniotomy and mass resection  Additional seizure risk factors include a history of epilepsy in her mother  Over the years, Ms Prescott was transitioned from Dilantin to [address] to Lamictal and she remained seizure-free until July, 2016, when she experienced a breakthrough seizure  More recently, seizures have been occurring every 2 months, with her last seizure occurring May, 2018  Current antiepileptic medications (AEDs) include: lacosamide, lamotrigine, and zonisamide  Ms Garland Ricks is also prescribed alprazolam, which she takes twice per day  Seizure semiology, both current and past, consists of three types:    1  Focal aware psychic seizures (simple partial seizures)  Manifest as feeling of mitchell vu and possible arm circling  These reportedly have not occurred in a long time  2  Presumed focal dyscognitive seizures/complex partial seizures  She previously reported a feeling of a "chill," but currently experiences a weird "mush" feeling, then staring, being less responsive, losing memory, with repetitive rubbing of her arm and possible oral automatisms  These last for a few minutes and then she is still a little confused afterward  3  Generalized tonic-clonic seizures: these have not occurred since 1980s       An MRI of the brain, from July, 2016, reportedly revealed, "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  Prior right anterior temporal lobectomy with expected remote postoperative changes  No acute hemorrhage or acute major vascular distribution infarction " Ms Prescott is scheduled to undergo repeat neuroimaging (3T MRI) and a PET scan  The patient was recently admitted to [facility] in May, 2018, during which several typical events were captured on video-EEG  It was noted that the events "were consistent with epileptic seizures    All 3 of her events were stereotyped, and appeared to have arisen from the right hemisphere, possibly the right temporal lobe  Exact EEG onset was a little unclear, as she did have some initial bilateral frontal changes  This semiology of her event was suggestive of temporal lobe onset, but with circling movements of her arms, would raise the possibility of frontal onset "    Regarding her cognition, Ms Prescott reported some "confusion," which was clarified as occasional word finding difficulties, some distractibility, and some mild forgetfulness  It is possible that these changes are age-related  Lilian Medici denied noticing any significant changes in her sister's cognitive or functional abilities  Ms Catarino Keyes maintains independence across activities of daily living (ADLs) and she denied changes in her occupational functioning  Past Medical History:   Diagnosis Date    Hypertension     Seizure ([address] )     Thyroid disease      PSYCHIATRIC/PSYCHOLOGICAL STATUS and HISTORY: Mood has been "alright," as Ms Prescott noted that she has been frustrated due to the limited independence/autonomy associated with seizure restrictions and the uncertainty of when she will regain her independence  She acknowledged feeling depressed, at times, as a result    The patient reported symptoms of sadness and tearfulness, along with some anhedonia and decreased interest  Danika added that her sister is typically the caregiver and most comfortable helping others, so having to rely on others has been a drastic and difficult transition  A history of depression was otherwise denied, but she did participate in counseling in the early 1990s due to work-related issues/stress  She resumed therapy in 2005 for several months due to relationship issues  Ms Мария Mckeon reported longstanding anxiety, characterized by worry, particularly surrounding her occupational functioning  As noted, she is currently treated with alprazolam, which she takes twice per day, as the patient noted that at one time she believed stress to be a seizure trigger  Sleep has been generally good, and her appetite is good and unchanged  Alcohol consumption was denied, as was use of illicit substances  Ms Мария Mckeon is a former smoker  A history of hallucinations, delusions, paranoia, or trauma was not elicited  RISK ASSESSMENT:  Over the past year, Ms Prescott endorsed a period when she experienced passive suicidal ideation due to the significant impact that the recurrence of seizures has had on her quality of life  She denied a plan or intent to act on these thoughts  Current suicidal/homicidal ideation, intent, or plan was denied  The patient was deemed to be at low risk for self-harm or harm to others  PSYCHOSOCIAL HISTORY: The patient was born in [place], [place], and resides in [place], [place] (where she continues to reside)  She and her sister were raised by both parents  Ms Мария Mckeon completed 14 years of formal education, earning two Associates degrees (in accounting and business administration)  She described herself as a "B-student," and she denied formal diagnoses of a learning disability or attention deficit disorder  Ms Мария Mckeon is currently employed as  for the [employer], where she has been for 5 years  Prior to this, she was worked in payroll for approximately 10 years with [employer]    Prior to that, she maintained employment with a company for 21 years, where she started as an  and was promoted to the position of controller  The patient is not currently in a relationship and she acknowledged having a supportive social network  Past Surgical History:   Procedure Laterality Date    BRAIN SURGERY      HYSTERECTOMY      OVARIAN CYST SURGERY      WISDOM TOOTH EXTRACTION       Current Outpatient Prescriptions   Medication Sig Dispense Refill    ALPRAZolam (XANAX) 0 25 mg tablet       Calcium Carbonate-Vitamin D (CALCIUM 600+D) 600-200 MG-UNIT TABS Take 1 tablet by mouth 2 (two) times a day      lacosamide (VIMPAT) 100 mg tablet Take 1 5 tablets (150 mg total) by mouth every 12 (twelve) hours 240 tablet 3    lamoTRIgine (LaMICtal) 200 MG tablet Take 200 mg in the morning and 300mg at night  225 tablet 3    pantoprazole (PROTONIX) 40 mg tablet Take 40 mg by mouth daily      zonisamide (ZONEGRAN) 100 mg capsule Taking 100mg in am and 200mg at night 270 capsule 3     No current facility-administered medications for this visit  BEHAVIORAL OBSERVATIONS: Ms Mago Olivier arrived early for her appointment and was accompanied by her sister  The patient presented as a pleasant, well-groomed, casually-dressed, bespectacled  female who appeared  her stated chronological age  Mood was reported to be dysthymic and frustrated, due to the functional changes, and affect was flat  Speech was normal rate and volume  Language comprehension appeared to be intact, as Ms Prescott was able to understand task instructions and complete all tasks as they were administered to her  Thought processes were linear, logical, and goal-directed  Hearing and vision were adequate for testing, though Ms Prescott reported initially experiencing diplopia following medication dosages; however, this seemed to have resolved by the time testing commenced  The patient was cooperative with testing procedures and appeared motivated throughout the evaluation  After testing, Ms Prescott acknowledged that her positioning during a motor task (tapping with the dominant right hand) was uncomfortable and likely had an adverse impact on her performance  Formal and embedded indicators of performance validity were within normal limits, and therefore, the results reported below appeared to be reflective of her current level of functioning  TEST RESULTS: A performance-based index of premorbid intellectual functioning was in the average range (TOPF: 96; 39th %ile), which was slightly lower than demographically-based predictions  On a standardized measure of intellectual functioning, Ms Prescott's overall performance was in the average range (GAI: 103; 58th %ile)  Her verbal/conceptual abilities were in the average range (VCI: 98; 45th%ile), as were her perceptual reasoning abilities (JASPER: 109; 73rd%ile)  While the 11-point verbal-nonverbal difference was statistically significant, a discrepancy of this magnitude can be found in 22% of the standardization sample, calling into question its clinical meaningfulness  Additionally, both index scores were consistent with premorbid estimates  Auditory working memory was in the low average range (WMI:83; 13th%ile), which was lower with her global verbal abilities and premorbid estimates  Processing speed was in the average range (PSI: 108; 70th %ile) and consistent with her global nonverbal abilities and premorbid estimates  Upper extremity motor speed and fine manual dexterity were in the average range with the dominant right hand (0 drops) and mildly impaired range with the nondominant left hand (0 drops)  Motor speed (tapping) was in the below average range with the dominant right hand and in the average range with the nondominant left hand  Ms Rosetta Favre explained after testing, however, that her positioning during the dominant hand testing was uncomfortable, which may have had an adverse impact on her performance      Immediate auditory attention was in the borderline to average range, and  her concentration/working memory, including mental arithmetic ability, was in the low average to average range  Visuomotor processing speed, while rapidly copying, sequencing, and/or matching symbols, was in the average to high average range across measures (0 errors)  Rapid reading of words and naming of colors was below average  Cognitive flexibility, while rapidly sequencing an alphanumeric series, was in the below average range relative to a demographically-similar peer group, but this appeared to be due to scanning issues (0 errors)  Maintenance of cognitive set, in the context of competing, salient stimulus characteristics, was in the average range and based on her slower performances on the control trials for this task, there was no evidence of interference  Verbal and nonverbal abstract reasoning were in the average and high average ranges, respectively  Generative word fluency, in response to letters provided by the examiner, was in the average range relative to a demographically-similar peer group  Figural fluency was impaired, as Ms Prescott committed an exorbitant number of perseverative errors  Novel problem-solving was intact, as Ms Prescott was able to complete 4 of 6 categories  She was able to efficiently incorporate the feedback she received to conceptualize various sorting principles and  meet the changing demands of the task  Verbal expression of word meanings and her fund of general knowledge was in the average range  Relative to a demographically-similar peer group, category fluency was in the below average range and visual confrontation naming was generally intact (raw: 55/60; 5/5 phonemic cues)  Auditory responsive naming was also intact (raw: 49/50)  Visuoconstruction of blocks to match a model was in the average range and the patients ability to mentally complete visual puzzles was in the superior range   Both of these tasks required visual synthesis and the ability to integrate individual parts into a whole  Visuospatial orientation was adequate (raw: 12/15)  Graphomotor visuoconstruction of a complex geometric figure was impaired, as Ms Prescott approached the task in a local/piecemealed manner  She started with the outline of the lower portion of the figure and, as a result, her reproduction contained misalignments, inaccuracies, perseverations, and slight distortions (raw: 22/36)  Global auditory memory was in the average range (AMI: 105; 63rd %ile), which was generally consistent with Ms Prescott's global verbal abilities  Immediate and long-delayed recall of auditory information presented in narrative/contextual format was in the average range (88% retention rate)  Her performance during a recognition trial was adequate (raw: 25/30)  Immediate and long-delayed recall of a word-pair list was in the high average and average ranges, respectively  Her performance during a recognition trial for this task was perfect (raw: 40/40)  Verbal learning, as assessed by a serial list learning task, was in the average range, as Ms Prescott benefited from repeated exposure to the information (6, 11, 10, 11,  & 13/15)  Immediate recall of items from a distractor list was in the average range (5/15)  Immediate recall of items from the original wordlist, following presentation of the distractor list, was in the above average range (12/15), as was her long-delayed recall (12/15)  When presented in recognition format, Ms Feldmans ability to accurately discern target items from distractor items was intact (hits: 14/15; 1 false positive)  With regard to visual memory, the learning of simple geometric figures was in the  average range and the patient benefited repeated exposure range (5, 6, & 8/12)  Her long-delayed recall of this information, however, was reduced, falling in the below average  range (6/12)  Her performance during a recognition trial was impaired (hits: 4/6; 0 false positive)   Finally, her immediate and long-delayed recall of the complex geometric figure that was encoded during the copy trial was impaired (8 5/36 and 6/36, respectively)  During her long-delayed recall performance, Ms Prescott rotated the figure 90 degrees, reproducing it vertically rather than horizontally  Her performance during a recognition trial was intact  Ms Rosetta Favre completed an objective, self-report, questionnaire assessing emotional and personality functioning (JONATHON)  Validity scales indicated that Ms Prescott attended to item content and responded in a consistent and forthright manner  There were no significant elevations across the clinical scales, but Ms Prescott's responses indicated concerns about health matters and physical functioning, which is not necessarily uncommon in persons with epilepsy  Additionally, she reported a tendency to worry, which was generally consistent with her report during the clinical interview  Ms Rosetta Favre also reported difficulties consistent with mild or transient depression  She denied thoughts of self-harm  SUMMARY and DISCUSSION: Intellectual functioning was assessed to fall in the average range, with evenly-developed verbal and visual abilities  Auditory attention/concentration and working memory were reduced across measures  Information processing, however, was intact  Language abilities, including confrontation and auditory responsive naming, were intact, as was generative word fluency  Visuoperceptual abilities were within normal limits, globally, and while Ms Prescott appeared to have difficulty when copying a complex geometric figure, this was largely due to a poorly-planned approach  Executive functioning was, otherwise, well-preserved, with exception of her performance a figural fluency task, which was impaired  Auditory/verbal learning and memory was intact across measures  Visual learning and memory, however, was reduced  Motor testing revealed mixed findings   Fine manual dexterity was impaired with the nondominant left hand, while motor speed (tapping) was slower, though within normal limits, with the dominant right hand; however, Ms Prescott stated that her positioning during the latter test was uncomfortable, and therefore, the result may be artefact  Personality/emotional testing was consistent with Ms Prescott's self-report during the clinical interview, noting a longstanding tendency to worry but the recent onset of depressive symptoms secondary to the recurrence on seizures and limited autonomy  She has been appropriately tearful and frustrated, and therefore, this seems consistent with a diagnosis of adjustment disorder  Overall, Ms Prescott demonstrated many well-preserved areas of neurocognitive and cortical functioning  There was some indication of frontal lobe dysfunction, characterized by weaker working memory, impaired figural fluency, reduced planning, and rotation of the complex figure during her delayed recall performance  Figural fluency has been demonstrated, across some studies, to be sensitive to right anterior dysfunction, and this was a primary impairment on testing  Additionally, fine manual dexterity was impaired with the nondominant left hand, and this test has been shown to be more sensitive to subtle motor deficits  While her performance on a measure of finger tapping was reduced with the dominant right hand, I believe this may have been artefact, as the patient noted after testing that her positioning was uncomfortable  In addition to the more subtle findings of right frontal lobe dysfunction, nonverbal memory was generally weak, although not grossly impaired, especially when compared to her well-preserved verbal memory performances  This finding would implicate dysfunction within the right temporal (nondominant) lobe   Therefore, in conclusion, results from this evaluation 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, which is concordant with her history right temporal lobectomy  With that being said, it should be reiterated that many of the observed weaknesses were relative in nature and did not necessarily represent rsuh impairments  Additionally, the findings should be interpreted in the context of the limited sensitivity of nonverbal memory measures in assessing right temporal lobe functioning, especially in patients with right temporal lobe epilepsy  Also, cognitive side-effects have been associated with several of Ms Prescott's antiepileptic medications, especially when used in polypharmacy  Lastly, Ms Dariana Thomason is exhibiting symptoms of adjustment disorder with depressive features related to reduced independence/autonomy and uncertainty of seizure control  While I do not necessarily think this has reached a level of clinical significance warranting treatment at this time, it will be very important to continue monitoring her mood, especially if she is deemed appropriate for surgery  The patient has been scheduled for a neuropsychological feedback session, at which point we will review these findings, impressions, and recommendations

## 2018-07-24 ENCOUNTER — OFFICE VISIT (OUTPATIENT)
Dept: NEUROLOGY | Facility: CLINIC | Age: 58
End: 2018-07-24
Payer: COMMERCIAL

## 2018-07-24 ENCOUNTER — HOSPITAL ENCOUNTER (OUTPATIENT)
Dept: RADIOLOGY | Age: 58
Discharge: HOME | End: 2018-07-24
Payer: COMMERCIAL

## 2018-07-24 DIAGNOSIS — Z98.890 HISTORY OF CRANIOTOMY: ICD-10-CM

## 2018-07-24 DIAGNOSIS — G40.209 LOCALIZATION-RELATED FOCAL EPILEPSY WITH COMPLEX PARTIAL SEIZURES (HCC): Primary | ICD-10-CM

## 2018-07-24 DIAGNOSIS — G40.209 LOCALIZATION-RELATED FOCAL EPILEPSY WITH COMPLEX PARTIAL SEIZURES (HCC): ICD-10-CM

## 2018-07-24 DIAGNOSIS — F43.21 ADJUSTMENT DISORDER WITH DEPRESSED MOOD: ICD-10-CM

## 2018-07-24 DIAGNOSIS — R41.89 COGNITIVE CHANGES: ICD-10-CM

## 2018-07-24 LAB — GLUCOSE SERPL-MCNC: 86 MG/DL (ref 65–140)

## 2018-07-24 PROCEDURE — 78608 BRAIN IMAGING (PET): CPT

## 2018-07-24 PROCEDURE — 82948 REAGENT STRIP/BLOOD GLUCOSE: CPT

## 2018-07-24 PROCEDURE — A9552 F18 FDG: HCPCS

## 2018-07-24 PROCEDURE — 96118 PR NEUROPSYCH TESTING BY PSYCH/PHYS: CPT | Performed by: CLINICAL NEUROPSYCHOLOGIST

## 2018-07-24 NOTE — PROGRESS NOTES
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 " The patient  was referred for a neuropsychological consultation by her epileptologist, Dr Melissa Cisneros, for a presurgical characterization of her neurocognitive and emotional functioning as part of her epilepsy surgery candidacy evaluation  The patient was seen on July 17, 2018, for a comprehensive neuropsychological evaluation and returned to my office today (07/24/2018) with patient and sister for a neuropsychological feedback session  07/24/2018: [CPT 76218: 1 hour(s) of professional time spent with the patient and her sister reviewing the neuropsychological results and recommendations and preparing this report]  03/80/1807: [Total licensed billing psychologists professional time including clinical interview; test selection, administration and scoring; interpretation of tests administered; integration of test results and other clinical data, and preparing final report = (13269: 9 hours)]    PRESENTING CONCERNS: The following information was obtained through a clinical interview with the patient and her sister, Mariah Mueller, as well as through review of available medical records  Ms Tarah Stack seizure onset was at age 14, which she is able to recall in vivid detail  At that time, she was diagnosed with encephalitis and hospitalized for about a week and treatment with Dilantin was initiated  She remained seizure-free until 1985, when she had her second seizure  During the next two years, Ms Amisha Arango had a few seizures, but in 1987 the frequency began to increase  It was at that time when she was found to have a right frontal mass and shortly after she underwent a craniotomy and mass resection  Additional seizure risk factors include a history of epilepsy in her mother  Over the years, Ms Prescott was transitioned from Dilantin to [address] to Lamictal and she remained seizure-free until July, 2016, when she experienced a breakthrough seizure  More recently, seizures have been occurring every 2 months, with her last seizure occurring May, 2018  Current antiepileptic medications (AEDs) include: lacosamide, lamotrigine, and zonisamide  Ms Мария Mckeon is also prescribed alprazolam, which she takes twice per day  Seizure semiology, both current and past, consists of three types:     1  Focal aware psychic seizures (simple partial seizures)  Manifest as feeling of mitchell vu and possible arm circling  These reportedly have not occurred in a long time  2  Presumed focal dyscognitive seizures/complex partial seizures  She previously reported a feeling of a "chill," but currently experiences a weird "mush" feeling, then staring, being less responsive, losing memory, with repetitive rubbing of her arm and possible oral automatisms  These last for a few minutes and then she is still a little confused afterward  3  Generalized tonic-clonic seizures: these have not occurred since 1980s     An MRI of the brain, from July, 2016, reportedly revealed, "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  Prior right anterior temporal lobectomy with expected remote postoperative changes  No acute hemorrhage or acute major vascular distribution infarction " Ms Prescott is scheduled to undergo repeat neuroimaging (3T MRI) and a PET scan  The patient was recently admitted to [facility] in May, 2018, during which several typical events were captured on video-EEG   It was noted that the events "were consistent with epileptic seizures    All 3 of her events were stereotyped, and appeared to have arisen from the right hemisphere, possibly the right temporal lobe   Exact EEG onset was a little unclear, as she did have some initial bilateral frontal changes   This semiology of her event was suggestive of temporal lobe onset, but with circling movements of her arms, would raise the possibility of frontal onset "     Regarding her cognition, Ms Prescott reported some "confusion," which was clarified as occasional word finding difficulties, some distractibility, and some mild forgetfulness  It is possible that these changes are age-related  Lorena Shields denied noticing any significant changes in her sister's cognitive or functional abilities  Ms Rosetta Favre maintains independence across activities of daily living (ADLs) and she denied changes in her occupational functioning       Past Medical History:   Diagnosis Date    Hypertension     Seizure ([facility] [address] )     Thyroid disease      PSYCHIATRIC/PSYCHOLOGICAL STATUS and HISTORY: Mood has been "alright," as Ms Prescott noted that she has been frustrated due to the limited independence/autonomy associated with seizure restrictions and the uncertainty of when she will regain her independence  She acknowledged feeling depressed, at times, as a result  The patient reported symptoms of sadness and tearfulness, along with some anhedonia and decreased interest  Danika added that her sister is typically the caregiver and most comfortable helping others, so having to rely on others has been a drastic and difficult transition  A history of depression was otherwise denied, but she did participate in counseling in the early 1990s due to work-related issues/stress  She resumed therapy in 2005 for several months due to relationship issues  Ms Rosetta Favre reported longstanding anxiety, characterized by worry, particularly surrounding her occupational functioning   As noted, she is currently treated with alprazolam, which she takes twice per day, as the patient noted that at one time she believed stress to be a seizure trigger  Sleep has been generally good, and her appetite is good and unchanged  Alcohol consumption was denied, as was use of illicit substances  Ms Hoang Tirado is a former smoker  A history of hallucinations, delusions, paranoia, or trauma was not elicited  RISK ASSESSMENT: Over the past year, Ms Prescott endorsed a period when she experienced passive suicidal ideation due to the significant impact that the recurrence of seizures has had on her quality of life  She denied a plan or intent to act on these thoughts  Current suicidal/homicidal ideation, intent, or plan was denied  The patient was deemed to be at low risk for self-harm or harm to others  Past Surgical History:   Procedure Laterality Date    BRAIN SURGERY      HYSTERECTOMY      OVARIAN CYST SURGERY      WISDOM TOOTH EXTRACTION       Current Outpatient Prescriptions   Medication Sig Dispense Refill    ALPRAZolam (XANAX) 0 25 mg tablet       Calcium Carbonate-Vitamin D (CALCIUM 600+D) 600-200 MG-UNIT TABS Take 1 tablet by mouth 2 (two) times a day      lacosamide (VIMPAT) 100 mg tablet Take 1 5 tablets (150 mg total) by mouth every 12 (twelve) hours 240 tablet 3    lamoTRIgine (LaMICtal) 200 MG tablet Take 200 mg in the morning and 300mg at night  225 tablet 3    pantoprazole (PROTONIX) 40 mg tablet Take 40 mg by mouth daily      zonisamide (ZONEGRAN) 100 mg capsule Taking 100mg in am and 200mg at night 270 capsule 3     No current facility-administered medications for this visit  No Known Allergies    MAIN FINDINGS:Intellectual functioning was assessed to fall in the average range, with evenly-developed verbal and visual abilities  Auditory attention/concentration and working memory were reduced across measures  Information processing, however, was intact   Language abilities, including confrontation and auditory responsive naming, were intact, as was generative word fluency  Visuoperceptual abilities were within normal limits, globally, and while Ms Prescott appeared to have difficulty when copying a complex geometric figure, this was largely due to a poorly-planned approach  Executive functioning was, otherwise, well-preserved, with exception of her performance a figural fluency task, which was impaired  Auditory/verbal learning and memory was intact across measures  Visual learning and memory, however, was reduced  Motor testing revealed mixed findings  Fine manual dexterity was impaired with the nondominant left hand, while motor speed (tapping) was slower, though within normal limits, with the dominant right hand; however, Ms Prescott stated that her positioning during the latter test was uncomfortable, and therefore, the result may be artefact  Personality/emotional testing was consistent with Ms Prescott's self-report during the clinical interview, noting a longstanding tendency to worry but the recent onset of depressive symptoms secondary to the recurrence on seizures and limited autonomy  She has been appropriately tearful and frustrated, and therefore, this seems consistent with a diagnosis of adjustment disorder  SUMMARY and DISCUSSION: Ms Lorenzo Neves and Sahra Rosario arrived promptly for the patient's feedback appointment  Ms Lorenzo Neves entered my office naming items from the wordlist administered during testing 1-week ago  We reviewed the findings from her evaluation, in detail, and I answered all questions to the best of my ability  I explained that the findings were consistent with right frontotemporal dysfunction characterized by: weaker working memory, impaired figural fluency, reduced planning, and rotation of the complex figure during her delayed recall performance, along with impaired fine manual dexterity with the nondominant left hand and reduced nonverbal memory  I explained the mixed motor findings and Ms Prescott confirmed that her performance on the finger tapping measure was adversely impacted by her positioning, and therefore, the results may be artefact  These findings are largely concordant with her history of right temporal lobectomy and possible seizure focus  With that being said, it was reiterated that many of the observed weaknesses were relative in nature and did not necessarily represent rush impairments  We also discussed the limited sensitivity of nonverbal memory measures in assessing right temporal lobe functioning, especially in patients with right temporal lobe epilepsy  I do believe Ms Prescott meets diagnostic criteria for adjustment disorder with depressive features, which warrants further monitoring, especially if she is deemed an appropriate for epilepsy surgery  The patient and her sister expressed an understanding of the findings as they were presented

## 2018-07-31 ENCOUNTER — TELEPHONE (OUTPATIENT)
Dept: NEUROLOGY | Facility: CLINIC | Age: 58
End: 2018-07-31

## 2018-07-31 NOTE — TELEPHONE ENCOUNTER
Pt got a text from [facility] stating that "your doctor has requested a test" Pt states that the MRI wo contrast and PET scan were completed at [facility]  She said states that she will drop the CD off on Thursday at [place] of the MRI  She is questioning if you wanted additional testing  Pt is complaining of double vision during the week from 8-10am then it gets clearer      [phone]

## 2018-08-01 ENCOUNTER — TELEPHONE (OUTPATIENT)
Dept: NEUROLOGY | Facility: CLINIC | Age: 58
End: 2018-08-01

## 2018-08-01 NOTE — TELEPHONE ENCOUNTER
Pt 's sister Abilio Sep) dropped off an MRI 3T disc from [facility]  Her phone # is [phone]   The disc was handed to the MA of the Epilepsy team

## 2018-08-01 NOTE — TELEPHONE ENCOUNTER
I don't think we would need any additional tests at this point  Once we have the discs and images uploaded to our system from her studies, we will be able to discuss her case at our epilepsy surgery conference (likely later this month)  As for her double vision, is this most mornings? The timing would suggest this is after she takes her am dose of medications, is that true? If so, this may be due to too much blockade of her sodium channels with combination of lacosamide and lamotrigine  She could decrease her lamotrigine a little further to 200 mg twice a day

## 2018-08-01 NOTE — TELEPHONE ENCOUNTER
Dr Destinee Cooper do you recomm patient decreasing her lamotrigine? Please see Nadeen's previous note

## 2018-08-01 NOTE — TELEPHONE ENCOUNTER
pt made aware  double vision is not every morning  only has during the week when she takes her meds earlier in the morning  during the week she takes meds around 7am   weekends around 8:45   occurs with in 1 5-2 hours of taking meds  she states that she ate more than normal this am, she ate a banana and 2 pieces of toast and took meds later and only minimal double vision this am  Normally she eats 2 pieces of toast   currently taking lacosamide 100mg  1 5 tabs bid  lamotrigine 200mg in am and 300mg pm    do you still recommend that she decrease lamotrigine to 200mg bid?

## 2018-08-01 NOTE — TELEPHONE ENCOUNTER
Thanks  We will need to obtain and review her images from [facility] when completed

## 2018-08-01 NOTE — TELEPHONE ENCOUNTER
Patient states she called [facility] and they told her that somebody there called our office and got the "okay" from our office, [facility] told her they were trying to get more images "on their own" for [facility] ? ? She was unable to provide any names as who was involved in this conversation    MRI is sched for 8/10    Patient states she really doesn't want to decrease the lamotrigine, doesn't want to "mess with it"

## 2018-08-02 NOTE — TELEPHONE ENCOUNTER
Patient made aware that she can continue at current dose of lamotrigine if she can take with her food but should decrease if she develops any intolerable side effects  Patient verbalized understanding

## 2018-08-02 NOTE — TELEPHONE ENCOUNTER
She had stated that she didn't want to change the dose, so I was under the impression that she was going to continue it unchanged for now  If she is having intolerable side effects, then she should decrease it, but if she is able to manage by taking her medications with food, then she does not need to change the dose of lamotrigine

## 2018-08-14 ENCOUNTER — TELEPHONE (OUTPATIENT)
Dept: NEUROLOGY | Facility: CLINIC | Age: 58
End: 2018-08-14

## 2018-08-14 NOTE — TELEPHONE ENCOUNTER
Spoke with patient, she had 3T MRI on Friday  Her niece and nephew will drop of the disc tomorrow to the [facility] office  This will need to be uploaded to PACS by Radiology ASAP for the upcoming epilepsy surgery conference

## 2018-08-15 ENCOUNTER — TELEPHONE (OUTPATIENT)
Dept: NEUROLOGY | Facility: CLINIC | Age: 58
End: 2018-08-15

## 2018-08-15 NOTE — TELEPHONE ENCOUNTER
Patient sister dropped off a MRI disc dated 8/10/18 this is the second one  Will give to dr Nixon Kaiser MA to send to [facility]      Thanks

## 2018-08-15 NOTE — TELEPHONE ENCOUNTER
Can we expedite getting it there and uploaded? I need it up loaded for the surgical conference next week   If not, I may have to drive it over to radiology myself

## 2018-08-27 ENCOUNTER — TELEPHONE (OUTPATIENT)
Dept: NEUROLOGY | Facility: CLINIC | Age: 58
End: 2018-08-27

## 2018-08-27 NOTE — TELEPHONE ENCOUNTER
Patient states she had a seizure yesterday afternoon:    Seizure description: patient walked to the mall and went into the store, had an aura that lasted a couple seconds, she tried to get her credit card out, store associates made her sit down, she remembers going in the ambulance, and was alert during the ambulance drive  Was staring, face was flushed, she did not fall, her left hand was flexed  Did not lose consciousness, "just out of it and unresponsive and was shaking"  Witnessed? yes  Duration? about 10 minutes  Multiple Seizures? no   Brought to the ER? Yes   [facility] [place]  Usual type of seizure? Yes  Sleep deprivation? Yes, issues sleeping for the past month, broken sleep  Missed Medications? no   Recently/Currently ill (URI, UTI, infections etc ) No  Any new medicatons (including OTC) No  ETOH or Drug use? No  New Stressors?  No  Current Medications confirmed as:  vimpat 200 mg BID (increased yesterday)  zonegran 100 mg in AM and 200 PM  lamictal 200 in AM and 300 PM    Today she feels tired and a little shaky and a little double vision (due to the increased vimpat)

## 2018-08-27 NOTE — TELEPHONE ENCOUNTER
She likely is having side effects from excessive sodium channel blockade from combination of both lamotrigine and lacosamide  To try to allow her to better tolerate the higher dose of lacosamide, please have her decrease her lamotrigine to 200 mg twice a day  It would be good for her to give us an update later this week to see how she is doing

## 2018-08-27 NOTE — TELEPHONE ENCOUNTER
Pt called back and states that she just received medication changes and was unsure of instructions  Reviewed instructions with her

## 2018-10-03 ENCOUNTER — OFFICE VISIT (OUTPATIENT)
Dept: NEUROLOGY | Facility: CLINIC | Age: 58
End: 2018-10-03
Payer: COMMERCIAL

## 2018-10-03 VITALS
HEIGHT: 66 IN | HEART RATE: 75 BPM | DIASTOLIC BLOOD PRESSURE: 71 MMHG | SYSTOLIC BLOOD PRESSURE: 135 MMHG | WEIGHT: 154 LBS | BODY MASS INDEX: 24.75 KG/M2

## 2018-10-03 DIAGNOSIS — G40.209 LOCALIZATION-RELATED FOCAL EPILEPSY WITH COMPLEX PARTIAL SEIZURES (HCC): Primary | ICD-10-CM

## 2018-10-03 PROCEDURE — 99214 OFFICE O/P EST MOD 30 MIN: CPT | Performed by: PSYCHIATRY & NEUROLOGY

## 2018-10-03 RX ORDER — LACOSAMIDE 50 MG/1
50 TABLET ORAL EVERY 12 HOURS SCHEDULED
COMMUNITY
End: 2019-01-22

## 2018-10-03 NOTE — PATIENT INSTRUCTIONS
-- Continue to take Zonisamide 100 mg in the morning and 200 mg at night, Lamotrigine 200 mg twice a day and Lacosamide 200 mg twice a day  -- when you have to get refills of the Lacosamide, call our office, and we can change it to be 200 mg tablets, so that you only have to take 1 tablet in the morning and 1 tablet at night  -- If we need to start another medication, the name of the next medication I would recommend would be Briviact (Brivaracetam)

## 2018-10-03 NOTE — PROGRESS NOTES
Patient ID: Nneka Coats is a 65 y o  female with localization-related epilepsy, previously thought to be due to encephalitis, woke with prior right anterior temporal lobectomy in the 1980s for right temporal mass), with pathology from the time noting right temporal cyst with cortical malformations, who is returning to Neurology office for follow up of her seizures  Assessment/Plan:    Localization-related focal epilepsy with complex partial seizures ([facility] [address] )  She unfortunately has continued to have occasional seizures  Her most recent seizure occurred on 8/26/2018  She has not had any additional seizures since increasing her lacosamide, and does not currently have any difficulty with peak dose side effects  Because she is doing okay with her current medications and we would need to give a little bit more time to see how she is doing her current regimen, I will not make any changes to her medications today  We extensively discussed her current epilepsy surgery evaluation  Overall, she did have prior surgery, and will need intracranial electrodes for better localization of her seizures and to determine the extent of any possible repeat epilepsy surgery  Additionally, she will likely need cortical mapping to avoid eloquent cortex  In the big picture, she has not been tried on very many seizure medications, and given that she had prior epilepsy surgery it may be reasonable to try 1 additional medication before pursuing epilepsy surgery  --she will continue her current medications unchanged  If she would have any additional seizures, I would likely would recommend starting a different medication  We discussed possibly starting Brivaracetam as a next step if she would have additional seizures  --if she would like to pursue epilepsy surgery, she would require intracranial EEG which could be performed at the [facility]    At this point she would prefer to wait to see how she does on her current doses of medications, but if she has additional seizures I would likely recommend starting Brivaracetam, but also referring her to the [facility] at the same time in order to get the ball rolling with looking into epilepsy surgery/intracranial EEG  --we again discussed driving restrictions due to her epilepsy  Because she had a recurrent seizure on 8/26/2018 with alteration of consciousness she cannot drive for 6 months from the seizure  I spent a total of 25 min with the patient with greater than 50% of that time spent counseling and coordinating her care, specifically discussing her diagnosis, epilepsy surgery, medications, and plan, as detailed above     She will return to the office in about 3 months  Subjective:    HPI  Current seizure medications:  1  Lacosamide 200 mg twice a day  2  Lamotrigine 200 mg twice a day  3  Zonisamide 100 mg in the morning and 200 mg at night  Other medications as per Epic  I last saw her in the office on 7/9/2018  At that time, she was seen in follow-up, and had not had any recurrent seizures since being in the epilepsy monitoring unit, but was having difficulty with dizziness and peak dose side effects from combination of lamotrigine and lacosamide  To try to improve her symptoms, she was instructed the decrease her dose of lamotrigine  To try to improve her seizures, her dose of lacosamide was increased slightly  Since her last visit, she did have a recurrent seizure on 8/26/2018  She was walking to the mall and it was not very hot out  She was at a store and felt 1 coming on  She then lost consciousness and was told that she was having trouble getting her cardiac card out of her wallet and was squeezing her family repeatedly  She was staring for approximately 10 min, and then came to with EMS  But was very confused    She was in the emergency department at [facility]   I did talk with the ER physician there, and her dose of lacosamide was increased to 200 mg twice a day  Since leaving the emergency department, she has been doing well  She has not had any additional seizures  She notes that when she eats in the morning, she does not have any difficulty with dizziness  Prior Medications: Phentoin (changed due to potential long-term side effects), Levetiracetam (behavioral changes)    Her history was also obtained from her sister, who was present at today's visit  The following portions of the patient's history were reviewed and updated as appropriate: allergies, current medications and problem list      Objective:    Blood pressure 135/71, pulse 75, height 5' 6" (1 676 m), weight 69 9 kg (154 lb)  Physical Exam    Neurological Exam      ROS:    Review of Systems   Constitutional: Negative for fever  HENT: Negative for hearing loss, tinnitus, trouble swallowing and voice change  Sinus problem  Blurred vision   Eyes: Negative  Negative for photophobia and pain  Respiratory: Positive for shortness of breath  Cardiovascular: Negative  Negative for palpitations  Gastrointestinal: Negative  Negative for nausea and vomiting  Endocrine: Negative  Negative for cold intolerance and heat intolerance  Genitourinary: Negative  Negative for dysuria, frequency and urgency  Musculoskeletal: Negative  Negative for myalgias and neck pain  Skin: Negative  Negative for rash  Neurological: Negative for dizziness, tremors, seizures, syncope, facial asymmetry, speech difficulty, weakness, light-headedness, numbness and headaches  Double vision   Hematological: Negative  Does not bruise/bleed easily  Psychiatric/Behavioral: Positive for sleep disturbance  Negative for confusion and hallucinations          Depression       I personally reviewed the ROS that was entered by the medical assistant

## 2018-10-04 ENCOUNTER — TELEPHONE (OUTPATIENT)
Dept: NEUROLOGY | Facility: CLINIC | Age: 58
End: 2018-10-04

## 2018-10-04 NOTE — TELEPHONE ENCOUNTER
She hasn't had abnormalities on EEG with photic stimulation, so it should be okay for her to attend  She has gone to concerts in the past without issue

## 2018-10-04 NOTE — TELEPHONE ENCOUNTER
----- Message from Sylvia Peña sent at 10/4/2018 11:21 AM EDT -----  Regarding: FW: Visit Follow-Up Question  Contact: [phone]      ----- Message -----  From: Alejandro Prescott  Sent: 10/3/2018   5:37 PM  To: [facility]  Subject: Visit Follow-Up Question                         Forgot to ask about attending concerts with light shows  I'm not talking [employer], but other shows that have flashing and strobe lights as part of the show?   Thanks,  Alejandro Loera

## 2018-10-04 NOTE — TELEPHONE ENCOUNTER
Please advise if you'd prefer the patient refrain from these types of shows due to potential trigger

## 2018-10-10 NOTE — ASSESSMENT & PLAN NOTE
She unfortunately has continued to have occasional seizures  Her most recent seizure occurred on 8/26/2018  She has not had any additional seizures since increasing her lacosamide, and does not currently have any difficulty with peak dose side effects  Because she is doing okay with her current medications and we would need to give a little bit more time to see how she is doing her current regimen, I will not make any changes to her medications today  We extensively discussed her current epilepsy surgery evaluation  Overall, she did have prior surgery, and will need intracranial electrodes for better localization of her seizures and to determine the extent of any possible repeat epilepsy surgery  Additionally, she will likely need cortical mapping to avoid eloquent cortex  In the big picture, she has not been tried on very many seizure medications, and given that she had prior epilepsy surgery it may be reasonable to try 1 additional medication before pursuing epilepsy surgery  --she will continue her current medications unchanged  If she would have any additional seizures, I would likely would recommend starting a different medication  We discussed possibly starting Brivaracetam as a next step if she would have additional seizures  --if she would like to pursue epilepsy surgery, she would require intracranial EEG which could be performed at the [facility]  At this point she would prefer to wait to see how she does on her current doses of medications, but if she has additional seizures I would likely recommend starting Brivaracetam, but also referring her to the [facility] at the same time in order to get the ball rolling with looking into epilepsy surgery/intracranial EEG  --we again discussed driving restrictions due to her epilepsy  Because she had a recurrent seizure on 8/26/2018 with alteration of consciousness she cannot drive for 6 months from the seizure

## 2018-11-06 ENCOUNTER — TELEPHONE (OUTPATIENT)
Dept: NEUROLOGY | Facility: CLINIC | Age: 58
End: 2018-11-06

## 2018-11-12 DIAGNOSIS — G40.209 LOCALIZATION-RELATED FOCAL EPILEPSY WITH COMPLEX PARTIAL SEIZURES (HCC): ICD-10-CM

## 2018-11-12 RX ORDER — LACOSAMIDE 200 MG/1
200 TABLET ORAL EVERY 12 HOURS SCHEDULED
Qty: 180 TABLET | Refills: 1 | Status: SHIPPED | OUTPATIENT
Start: 2018-11-12 | End: 2019-04-24 | Stop reason: SDUPTHER

## 2019-01-08 ENCOUNTER — TELEPHONE (OUTPATIENT)
Dept: NEUROLOGY | Facility: CLINIC | Age: 59
End: 2019-01-08

## 2019-01-08 NOTE — TELEPHONE ENCOUNTER
Pt calls to report 2 episode she had  On 12/27 she was sitting on the sofa when she lost track of time  She said 10 minutes passed and she thought maybe she just fell asleep  Pt was not concerned and didn't think much of it until this morning's event  Pt states that this morning she was sitting at the kitchen table at 7:10am 30 minutes later she was still sitting at the table have no recollection of this time passing  Neither events were witnessed  Pt reports missing 1 dose am dose of Vimpat on 12/23 or 12/24  No missed medication since then  Pt denies starting any new med  Pt states after both events she felt normal and has never experienced this type of events before  Pt states she is interested in VNS  She has an upcoming appt scheduled 1/22/19  She is questioning if she should be seen sooner      Current meds confirmed:  -Lamictal 200mg BID (prescribed different 200mg am and 300mg pm)  -Zonisamide 100mg am and 200mg pm  -Vimpat 200mg BID

## 2019-01-08 NOTE — TELEPHONE ENCOUNTER
Those are quite different from her prior events  It is certainly possible that they were seizures, although she had been without any events for quite a while preceding these in December  I would recommend we see how she does until her next follow up and we can discuss next steps at that point  We were considering surgery and other options like devices or other medications  The distinction between these may be good to discuss fully in person

## 2019-01-18 NOTE — PROGRESS NOTES
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, who is returning to Neurology office for follow up of her seizures  Assessment/Plan:    Localization-related focal epilepsy with complex partial seizures ([facility] [address] )  We extensively discussed her 2 most recent episodes  These are little different from her previously described seizures, but the fact that she did get a typical warning sensation prior to the event at the end of December, would suggest that this may be an epileptic seizure  At this point, she is on maximally tolerated doses of her current medications, not previously tolerating higher doses  We would get a point where we would need to consider a different medication, or other treatments like epilepsy surgery, or devices for epilepsy  We discussed these possibilities, and I think it would be best to take the next step toward the possibility of epilepsy surgery  As such, I will refer her to the [facility] for a consultation  We will help facilitate this, and will send all of her phase 1 presurgical evaluation for review  Her our prior epilepsy surgery conference, she will likely need intracranial EEG around the area of her prior resection  Her seizures appear to arise from the right temporal area, but she will likely need additional testing to assure that eloquent cortex is avoided, and her seizures are better pinpointed  --we are at a bit of a crossroads we need to change medications, likely by changing 1 of her current medications to a different medication    At this point her recent episodes were little bit atypical, will give a little bit more time to see how she does, but he has additional similar episodes, or if she has more typical seizures, he will likely need to change her medications  --especially if we are still looking into the possibility of epilepsy surgery, I would hold off on any medical devices  If she is not able to get epilepsy surgery, we could consider VNS  However if not able to get a resected surgery, following intracranial EEG implantation, she may be a candidate for RNS  I discussed that overall epilepsy surgery may be the treatment that would give her the highest likelihood of seizure freedom, but exactly what any potential surgery would entail, would depend on further evaluation  --she will continue her current medications unchanged    I spent a total of 40 min with the patient with greater than 50% of that time spent counseling and coordinating her care, specifically discussing her diagnosis, potential for epilepsy surgery and referral to the [address] [place] [address], as detailed above     She will return to the office in about 3 months  Subjective:    HPI  Current seizure medications:  1  Lamotrigine 200 milligrams twice a day  2  Lacosamide 200 milligrams twice a day  2  Zonisamide 100 milligrams in the morning and 200 milligrams at night  Other medications as per Epic  I last saw her in the office on 10/3/2018  At that time, she was doing well without any recurrent seizures since her seizure on 8/26/2018, after which time her lacosamide was increased to her current dose listed above  To give more time on her medications to better determine the efficacy, no change were made at that time  Although she had a full pre-surgical evaluation, and there was consideration of referring her to the [facility] for possible epilepsy surgery, she preferred to wait on this until more time had elapsed to see if she had recurrent seizures      Since her last visit, she had done well without any episodes concerning for seizures, but then on 12/26/2018, she had been up late the night previously, and then got up in the morning, ate breakfast, and sat down to watch a little TV  She felt a little odd sensation, as if his seizure was about to occur, but reports that this sensation past   She then lost awareness, verses fell asleep, and does not have any memory for the next approximately 10 minutes  When she came to, she was little confused, but quickly was able to realize that her ride was there for work, and got ready and went to work  She had another episode on 1/8/2019, again in the morning  She was sitting in her kitchen table to eat breakfast, and suddenly 30 minutes had passed, and a ride was there again for work  She did not get any warning or typical feelings of a seizure around this time  She again felt normally after the event  She denies any other definite or more typical seizures since her last appointment  She continues to tolerate her medications okay  She continues to have some side effects, but feels these are tolerable  Her medications do make more sleepy  Prior Seizure Medications: Phentoin (changed due to potential long-term side effects), Levetiracetam (behavioral changes)    Her history was also obtained from her sister, who was present at today's visit  The following portions of the patient's history were reviewed and updated as appropriate: allergies, current medications and problem list      Objective:    Blood pressure 121/68, pulse 72, height 5' 6" (1 676 m), weight 69 7 kg (153 lb 11 2 oz)  Physical Exam    Neurological Exam      ROS:    Review of Systems   Constitutional: Positive for fatigue  Negative for appetite change and fever  Recent weight gain   HENT: Negative  Negative for hearing loss, tinnitus, trouble swallowing and voice change  Eyes: Negative  Negative for photophobia and pain  Respiratory: Negative  Negative for shortness of breath  Cardiovascular: Negative  Negative for palpitations  Gastrointestinal: Negative  Negative for nausea and vomiting  Endocrine: Negative  Negative for cold intolerance and heat intolerance  Genitourinary: Negative  Negative for dysuria, frequency and urgency  Musculoskeletal: Negative  Negative for myalgias and neck pain  Skin: Negative  Negative for rash  Neurological: Negative for dizziness, tremors, seizures, syncope, facial asymmetry, speech difficulty, weakness, light-headedness, numbness and headaches  Waking up at night   Hematological: Negative  Does not bruise/bleed easily  Psychiatric/Behavioral: Negative for confusion, hallucinations and sleep disturbance          Anxiety         I personally reviewed the ROS that was entered by the medical assistant

## 2019-01-22 ENCOUNTER — OFFICE VISIT (OUTPATIENT)
Dept: NEUROLOGY | Facility: CLINIC | Age: 59
End: 2019-01-22
Payer: COMMERCIAL

## 2019-01-22 ENCOUNTER — TELEPHONE (OUTPATIENT)
Dept: NEUROLOGY | Facility: CLINIC | Age: 59
End: 2019-01-22

## 2019-01-22 VITALS
HEIGHT: 66 IN | DIASTOLIC BLOOD PRESSURE: 68 MMHG | SYSTOLIC BLOOD PRESSURE: 121 MMHG | WEIGHT: 153.7 LBS | BODY MASS INDEX: 24.7 KG/M2 | HEART RATE: 72 BPM

## 2019-01-22 DIAGNOSIS — G40.209 LOCALIZATION-RELATED FOCAL EPILEPSY WITH COMPLEX PARTIAL SEIZURES (HCC): ICD-10-CM

## 2019-01-22 PROCEDURE — 99215 OFFICE O/P EST HI 40 MIN: CPT | Performed by: PSYCHIATRY & NEUROLOGY

## 2019-01-22 RX ORDER — LAMOTRIGINE 200 MG/1
TABLET ORAL
Qty: 180 TABLET | Refills: 3 | Status: SHIPPED | OUTPATIENT
Start: 2019-01-22 | End: 2019-01-22 | Stop reason: SDUPTHER

## 2019-01-22 RX ORDER — LAMOTRIGINE 200 MG/1
TABLET ORAL
Qty: 180 TABLET | Refills: 3 | Status: SHIPPED | OUTPATIENT
Start: 2019-01-22 | End: 2019-11-25 | Stop reason: SDUPTHER

## 2019-01-22 NOTE — TELEPHONE ENCOUNTER
----- Message from Rahul Phillip MD sent at 1/22/2019 11:09 AM EST -----  Abigail Milla is ready to be referred to [facility] for epilepsy surgery evaluation  Would you be able to help facilitate the referral  She will obviously need all of her imaging, EEGs, and testing sent down, preferably ahead of time or with her when she goes down

## 2019-01-22 NOTE — PATIENT INSTRUCTIONS
- Continue to take your medications unchanged  If you have any more events, please call our office and we may consider changing your medications  -- We will refer you down to the [facility] [address] for evaluation of epilepsy surgery

## 2019-01-22 NOTE — TELEPHONE ENCOUNTER
Called Radiology and requested disc of PET be interofficed to me  Emailed Karolyn Beltrán for discs of EEGs  I have a copy of the patient disc from [facility] with her MRI  Once I receive everything, I will reach out to Hilario Parker to make him aware I'll be sending everything down to him

## 2019-01-22 NOTE — TELEPHONE ENCOUNTER
Received call back from Idalia Hood and made him aware of referring  Will fax referral and last office note to him at [phone]  He is aware discs of MRI, PET, and EEGs will be mailed to him

## 2019-01-22 NOTE — ASSESSMENT & PLAN NOTE
We extensively discussed her 2 most recent episodes  These are little different from her previously described seizures, but the fact that she did get a typical warning sensation prior to the event at the end of December, would suggest that this may be an epileptic seizure  At this point, she is on maximally tolerated doses of her current medications, not previously tolerating higher doses  We would get a point where we would need to consider a different medication, or other treatments like epilepsy surgery, or devices for epilepsy  We discussed these possibilities, and I think it would be best to take the next step toward the possibility of epilepsy surgery  As such, I will refer her to the [facility] for a consultation  We will help facilitate this, and will send all of her phase 1 presurgical evaluation for review  Her our prior epilepsy surgery conference, she will likely need intracranial EEG around the area of her prior resection  Her seizures appear to arise from the right temporal area, but she will likely need additional testing to assure that eloquent cortex is avoided, and her seizures are better pinpointed  --we are at a bit of a crossroads we need to change medications, likely by changing 1 of her current medications to a different medication  At this point her recent episodes were little bit atypical, will give a little bit more time to see how she does, but he has additional similar episodes, or if she has more typical seizures, he will likely need to change her medications  --especially if we are still looking into the possibility of epilepsy surgery, I would hold off on any medical devices  If she is not able to get epilepsy surgery, we could consider VNS  However if not able to get a resected surgery, following intracranial EEG implantation, she may be a candidate for RNS    I discussed that overall epilepsy surgery may be the treatment that would give her the highest likelihood of seizure freedom, but exactly what any potential surgery would entail, would depend on further evaluation      --she will continue her current medications unchanged

## 2019-01-31 NOTE — TELEPHONE ENCOUNTER
Received EEG disc  Everything mailed to [facility]   I left him a message making him aware and to call back if he needs anything additional

## 2019-03-13 ENCOUNTER — TELEPHONE (OUTPATIENT)
Dept: NEUROLOGY | Facility: CLINIC | Age: 59
End: 2019-03-13

## 2019-03-13 DIAGNOSIS — G40.209 LOCALIZATION-RELATED FOCAL EPILEPSY WITH COMPLEX PARTIAL SEIZURES (HCC): Primary | ICD-10-CM

## 2019-03-13 NOTE — TELEPHONE ENCOUNTER
Sorry to hear that she had another seizure  As we discussed in the office previously, we may need to think about starting a different medication  One option would be to start Briviact (Brivaracetam)  This works similar to Levetiracetam ([address]), but does not have the same risk of behavioral changes that Levetiracetam has  Since she experienced behavioral changes with Levetiracetam in the past, this may be a reasonable option  If she is agreeable to start this, we likely should decrease either the lacosamide or lamotrigine dose, since she has been having some trouble with peak dose side effects with these  Certainly, it will be important to see [address] to look into the possibility of surgery

## 2019-03-13 NOTE — TELEPHONE ENCOUNTER
Patient called reporting she believes she had another seizure today at work  Seizure description: Co-workers walked past her office and she was observed staring and breathing in and out deeply  Waylan Angry came in and reported patients legs were "shaking" and states she was breathing deeply in and out  Her boss said she responed to his question with one word answer and he asked another co-worker to check on her  Coworker asked her time of day and she did provide correct answer  Patient states she doesn't remember boss coming in her office but does recall her other co-workers checking on her  Witnessed? yes  Duration? Per patient co-workers stated approx 15 minutes  Multiple Seizures? no     Brought to the ER? No     Usual type of seizure? No, usually has forewarning  Usually does have staring, one word answers  Sleep deprivation? Up late two nights over the weekend- Saturday and Sunday (states her episode back in December occurred when she had been up late)  Missed Medications? No     Recently/Currently ill (URI, UTI, infections etc ) No    Any new medicatons (including OTC) No   Did take two aspirin (325mg) this morning for headache    ETOH or Drug use? No    New Stressors?  No  Current Medications confirmed as:  Lamotrigine 200mg BID  Vimpat 200mg q12h  Zonisamide 100mg in am 200mg pm    Okay to leave detailed voicemail if unable to reach patient at P: [phone]

## 2019-03-14 NOTE — TELEPHONE ENCOUNTER
I called and spoke to pt and she states she would be willing to start Briviact  She states she also scheduled an Vamsi for 4/12/19   Please advise re starting briviact and decreasing other meds

## 2019-03-15 NOTE — TELEPHONE ENCOUNTER
Please have her start taking Briviact 50 mg daily for 5 days, then take 50 mg twice a day  When she gets to the goal dose of Briviact, please have her decrease her Vimpat to be 100 mg in the am and 200 mg in the pm      She should continue that until I see her in the office next month  If she is doing okay with the new medication, we will likely try to decrease Vimpat further  Rx sent to her pharmacy

## 2019-03-18 NOTE — TELEPHONE ENCOUNTER
Pt called stated that her Briviact was not covered   I called [facility] to get PA info:  Phone: [phone]  Rx Rhonda Guest [id number]  Rx PCN [id number]  Rx GROUP [id number]  ID# [id number]

## 2019-03-19 NOTE — TELEPHONE ENCOUNTER
Pt called and states that her vimpat was decreased to 100 mg in am and 200 mg in pm  Pt does not have 100 mg left  States that tablets is not scored  Asking if ok to cut in half? If not, she will need rx vimpat 100 mg be sent to [facility]        Thanks      [phone]

## 2019-04-24 ENCOUNTER — OFFICE VISIT (OUTPATIENT)
Dept: NEUROLOGY | Facility: CLINIC | Age: 59
End: 2019-04-24
Payer: COMMERCIAL

## 2019-04-24 VITALS
WEIGHT: 158 LBS | HEIGHT: 66 IN | DIASTOLIC BLOOD PRESSURE: 61 MMHG | SYSTOLIC BLOOD PRESSURE: 138 MMHG | BODY MASS INDEX: 25.39 KG/M2 | HEART RATE: 65 BPM

## 2019-04-24 DIAGNOSIS — Q04.3 POLYMICROGYRIA (HCC): ICD-10-CM

## 2019-04-24 DIAGNOSIS — G40.209 LOCALIZATION-RELATED FOCAL EPILEPSY WITH COMPLEX PARTIAL SEIZURES (HCC): ICD-10-CM

## 2019-04-24 PROCEDURE — 99214 OFFICE O/P EST MOD 30 MIN: CPT | Performed by: PSYCHIATRY & NEUROLOGY

## 2019-04-24 RX ORDER — ZONISAMIDE 100 MG/1
CAPSULE ORAL
Qty: 270 CAPSULE | Refills: 3 | Status: SHIPPED | OUTPATIENT
Start: 2019-04-24 | End: 2020-03-30 | Stop reason: SDUPTHER

## 2019-04-24 RX ORDER — LACOSAMIDE 200 MG/1
TABLET ORAL
Qty: 135 TABLET | Refills: 1 | Status: SHIPPED | OUTPATIENT
Start: 2019-04-24 | End: 2019-08-06 | Stop reason: SDUPTHER

## 2019-05-01 ENCOUNTER — TELEPHONE (OUTPATIENT)
Dept: NEUROLOGY | Facility: CLINIC | Age: 59
End: 2019-05-01

## 2019-08-01 NOTE — PROGRESS NOTES
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 [address] )  She has not had any additional seizures since her most recent seizure on 3/13/2019  It was after this time that she was started on Brivaracetam   I am hopeful that she will continued to be seizure-free, but she has previously had long periods of seizure freedom, sometimes up to 6 months at a time  Because she has not had any additional seizures since this was added, it would be best to keep her medications unchanged for now  In regard to her surgical evaluation, I will have our nurse navigator reach out to the [facility] to see if there is any other information the need for her to be discussed at their epilepsy surgery conference  At this point, she has not had any seizures since her most recent medication adjustment, as above, so we would not pursue surgery unless her seizures would continue  Will be important to give her medications more time, but I would like to make sure that we are still making process with a surgical evaluation if her seizures would return  --she will continue her medications unchanged  If she would have additional seizures, her dose of Brivaracetam could be increased  --she is still having some side effects to her medications, and I discussed the possibility of trying to decrease her zonisamide slightly to try to improve her tolerance of other medications, but because she is close to the 6 month francie from her most recent seizure, she preferred not to make any changes today      I spent a total of 25 min with the patient with greater than 50% of that time spent counseling and coordinating her care, specifically discussing her diagnosis, surgical evaluation to date, medications, and plan, as detailed above     She will return to the office in about 4 months  Subjective:    HPI  Current seizure medications:  1  Lacosamide 100 mg in the morning and 200 mg at night  2  Zonisamide 100 mg in the morning and 200 mg at night  3  Lamotrigine 200 mg twice a day  4  Brivaracetam 50 mg twice a day   Other medications as per [facility]  I last saw her in the office on 4/24/2019  Prior to that visit, she had experienced a breakthrough seizure was started on Brivaracetam   At the time of her last visit, she had not had any additional seizures since that was started, so no changes were made to her medications  Since her last visit, she has continued to be seizure-free  She reports that she still does have some difficulty with side effects from her medications, mainly noting some double vision in the morning and occasionally feeling dizzy in the evenings  Overall this is fairly mild and she is able to work through the symptoms  Additionally, she was seen at the [facility] for surgical evaluation and potential intracranial EEG  She had a JANE study performed recently, which does not appear to have captured any interictal epileptiform discharges, but was able to the assist in localizing some of her eloquent cortex  Currently they are in the process of reviewing all of her information, but likely will need to be discussed at the epilepsy surgery conference soon      Prior Seizure Medications: Phentoin (changed due to potential long-term side effects), Levetiracetam (behavioral changes)    I reviewed prior notes from the [facility] which are summarized incorporated above    The following portions of the patient's history were reviewed and updated as appropriate: allergies, current medications and problem list      Objective:    Blood pressure 162/80, pulse 61, height 5' 6" (1 676 m), weight 71 7 kg (158 lb)  Physical Exam    Neurological Exam      ROS:    Review of Systems   Constitutional: Negative  Negative for appetite change and fever  HENT: Negative  Negative for hearing loss, tinnitus, trouble swallowing and voice change  Eyes: Negative  Negative for photophobia and pain  Respiratory: Negative  Negative for shortness of breath  Cardiovascular: Negative  Negative for palpitations  Gastrointestinal: Negative  Negative for nausea and vomiting  Endocrine: Negative  Negative for cold intolerance and heat intolerance  Genitourinary: Negative  Negative for dysuria, frequency and urgency  Musculoskeletal: Negative  Negative for myalgias and neck pain  Skin: Negative  Negative for rash  Allergic/Immunologic: Negative  Neurological: Negative for dizziness, tremors, seizures, syncope, facial asymmetry, speech difficulty, weakness, light-headedness, numbness and headaches  Last seizure was 3/13/2019   Hematological: Negative  Does not bruise/bleed easily  Psychiatric/Behavioral: Negative  Negative for confusion, hallucinations and sleep disturbance         I personally reviewed the ROS that was entered by the medical assistant

## 2019-08-06 ENCOUNTER — OFFICE VISIT (OUTPATIENT)
Dept: NEUROLOGY | Facility: CLINIC | Age: 59
End: 2019-08-06
Payer: COMMERCIAL

## 2019-08-06 VITALS
WEIGHT: 158 LBS | HEART RATE: 61 BPM | SYSTOLIC BLOOD PRESSURE: 162 MMHG | BODY MASS INDEX: 25.39 KG/M2 | DIASTOLIC BLOOD PRESSURE: 80 MMHG | HEIGHT: 66 IN

## 2019-08-06 DIAGNOSIS — G40.209 LOCALIZATION-RELATED FOCAL EPILEPSY WITH COMPLEX PARTIAL SEIZURES (HCC): ICD-10-CM

## 2019-08-06 PROCEDURE — 99214 OFFICE O/P EST MOD 30 MIN: CPT | Performed by: PSYCHIATRY & NEUROLOGY

## 2019-08-06 RX ORDER — LACOSAMIDE 100 MG/1
TABLET ORAL
Qty: 270 TABLET | Refills: 1 | Status: SHIPPED | OUTPATIENT
Start: 2019-08-06 | End: 2019-11-25 | Stop reason: SDUPTHER

## 2019-08-06 NOTE — ASSESSMENT & PLAN NOTE
She has not had any additional seizures since her most recent seizure on 3/13/2019  It was after this time that she was started on Brivaracetam   I am hopeful that she will continued to be seizure-free, but she has previously had long periods of seizure freedom, sometimes up to 6 months at a time  Because she has not had any additional seizures since this was added, it would be best to keep her medications unchanged for now  In regard to her surgical evaluation, I will have our nurse navigator reach out to the [facility] to see if there is any other information the need for her to be discussed at their epilepsy surgery conference  At this point, she has not had any seizures since her most recent medication adjustment, as above, so we would not pursue surgery unless her seizures would continue  Will be important to give her medications more time, but I would like to make sure that we are still making process with a surgical evaluation if her seizures would return  --she will continue her medications unchanged  If she would have additional seizures, her dose of Brivaracetam could be increased  --she is still having some side effects to her medications, and I discussed the possibility of trying to decrease her zonisamide slightly to try to improve her tolerance of other medications, but because she is close to the 6 month francie from her most recent seizure, she preferred not to make any changes today

## 2019-08-06 NOTE — PATIENT INSTRUCTIONS
-- Continue your medications unchanged  -- I will check on the status of your surgical evaluation  -- If you have any additional seizures, please give our office a call

## 2019-08-16 ENCOUNTER — DOCUMENTATION (OUTPATIENT)
Dept: NEUROLOGY | Facility: CLINIC | Age: 59
End: 2019-08-16

## 2019-08-16 NOTE — PROGRESS NOTES
Received disc from [address] [place], [place] [address] and am currently waiting for images to upload to the pt chart  Will send back to pt as soon as the images are uploaded

## 2019-08-20 NOTE — PROGRESS NOTES
Confirmed with Stephania Medrano that the patient's images were uploaded into their chart  Mailing the patient's disc to their home  Placed the disc in the bin to be mailed

## 2019-09-13 ENCOUNTER — TELEPHONE (OUTPATIENT)
Dept: NEUROLOGY | Facility: CLINIC | Age: 59
End: 2019-09-13

## 2019-09-13 NOTE — TELEPHONE ENCOUNTER
Pt called and states that she has not had any additional seizures  Last seizure was on 3/13/19  Today, 6 month seizure free  Requesting [employer] forms be filled out  This forms was faxed on 9/9/19  Forms in [employer] folder  Pt made aware of 2 week turn around and fee  Pt states that she was not aware of the fee and 2 week turn around  Requesting forms be filled out next week  [facility] MR to drop charges  Call transferred to Randolph Pratt to collect payment  Maureen Moore

## 2019-11-25 DIAGNOSIS — G40.209 LOCALIZATION-RELATED FOCAL EPILEPSY WITH COMPLEX PARTIAL SEIZURES (HCC): ICD-10-CM

## 2019-11-27 RX ORDER — LAMOTRIGINE 200 MG/1
TABLET ORAL
Qty: 180 TABLET | Refills: 3 | Status: SHIPPED | OUTPATIENT
Start: 2019-11-27 | End: 2020-03-30 | Stop reason: SDUPTHER

## 2019-11-27 RX ORDER — LACOSAMIDE 100 MG/1
TABLET ORAL
Qty: 270 TABLET | Refills: 1 | Status: SHIPPED | OUTPATIENT
Start: 2019-11-27 | End: 2020-03-30 | Stop reason: SDUPTHER

## 2019-12-06 NOTE — PROGRESS NOTES
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, who is returning to Neurology office for follow up of her seizures  Assessment/Plan:    Localization-related focal epilepsy with complex partial seizures ([facility] [address] )  She has not had any additional definite seizures since her last appointment  She does report 2 mild episodes of an odd sensation, without any alteration of consciousness, which she is concerned may be auras of her seizures  These episodes were different from prior seizures, so it is not clear if these are definitely epileptic seizures or not  Either way, I would like to avoid any additional episodes, so I will have her increase her dose of Brivaracetam, as below  Ultimately would be nice to try to simplify some of her other medications, but I would like to avoid any medication changes until she has had a longer period of time being seizure-free  We extensively discussed the results of her epilepsy surgery evaluation  Overall, her seizures do appear to be arising from her right hemisphere, likely the right posterior temporal area  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, and this may be amenable to surgery  That being said, because of its location and because of the extent of her prior resection, any additional resection may result and visual field changes, which also could preclude her from being able to drive  The main goal of her seizure-freedom is for her to return to driving, so is unclear if surgery would have a significant impact on her quality of life  We agreed that overall, she is currently seizure-free, so we will not make any further moves to pursue surgery unless she would start having additional seizures      --she will continue lacosamide, zonisamide, and lamotrigine unchanged  I will have her increase Brivaracetam to be 75 mg twice a day  I discussed the risks rationale this increase  If she continues to be seizure-free, we could consider possibly decreasing zonisamide in the future, since this was not particularly helpful when it was added  --I will hold on any additional evaluation for epilepsy surgery unless she would have more frequent seizures  We discussed the possibility of getting visual field testing, which will be needed before doing intracranial EEG, but she again preferred to wait on this until such a time that she would have additional seizures  I spent a total of 25 min with the patient with greater than 50% of that time spent counseling and coordinating her care, specifically discussing her diagnosis, medications, possible epilepsy surgery, and plan, as detailed above     She will return to the office in about 3 months  Subjective:    HPI  Current seizure medications:  1  Lacosamide 100 mg in the morning and 200 mg at night  2  Zonisamide 100 mg in the morning and 200 mg at night  3  Lamotrigine 200 mg twice a day  4  Brivaracetam 50 mg twice a day  Other medications as per Epic  I last saw her in the office on 8/6/2019  At that time, she was doing well without any recurrent seizures since being started on Brivaracetam   She was tolerating her medications okay without any significant side effects as long she took it with food, so no changes were made to her medications  Since her last visit, she has continued to be seizure-free  She does report to possible auras, which were very different from her prior auras  She felt an odd sensation on her head as if an egg was cracked on her head, but this only lasted for a few moments, and did not involve any alteration of consciousness  She has not had any other definite seizures    She continues to tolerate her medications okay, with some mild side effects in the mornings, which is mild and manageable as long as she takes her medications with food  I did review and discuss the results of her epilepsy surgery conference from the [facility]  She was evaluated there, in her information was reviewed  Please see above for discussion of this  Prior Seizure Medications: Phentoin (changed due to potential long-term side effects), Levetiracetam (behavioral changes)    I reviewed reason notes including notes from the [facility] including their epilepsy surgery conference recommendations, as documented in [email or web address], and summarized above  The following portions of the patient's history were reviewed and updated as appropriate: allergies, current medications, past social history and problem list      Objective:    Blood pressure 147/74, pulse 72, height 5' 6" (1 676 m), weight 72 6 kg (160 lb)  Physical Exam    Neurological Exam      ROS:    Review of Systems   Constitutional: Negative  Negative for appetite change and fever  HENT: Negative  Negative for hearing loss, tinnitus, trouble swallowing and voice change  Eyes: Negative  Negative for photophobia and pain  Respiratory: Negative  Negative for shortness of breath  Cardiovascular: Negative  Negative for palpitations  Gastrointestinal: Negative  Negative for nausea and vomiting  Endocrine: Negative  Negative for cold intolerance and heat intolerance  Genitourinary: Negative  Negative for dysuria, frequency and urgency  Musculoskeletal: Negative  Negative for myalgias and neck pain  Skin: Negative  Negative for rash  Allergic/Immunologic: Negative  Neurological: Positive for seizures (2 auras on sep 2 and nov 5)  Negative for dizziness, tremors, syncope, facial asymmetry, speech difficulty, weakness, light-headedness, numbness and headaches  Hematological: Negative  Does not bruise/bleed easily     Psychiatric/Behavioral: Negative  Negative for confusion, hallucinations and sleep disturbance         I personally reviewed the ROS that was entered by the medical assistant

## 2019-12-10 ENCOUNTER — OFFICE VISIT (OUTPATIENT)
Dept: NEUROLOGY | Facility: CLINIC | Age: 59
End: 2019-12-10
Payer: COMMERCIAL

## 2019-12-10 VITALS
SYSTOLIC BLOOD PRESSURE: 147 MMHG | DIASTOLIC BLOOD PRESSURE: 74 MMHG | HEIGHT: 66 IN | WEIGHT: 160 LBS | BODY MASS INDEX: 25.71 KG/M2 | HEART RATE: 72 BPM

## 2019-12-10 DIAGNOSIS — G40.209 LOCALIZATION-RELATED FOCAL EPILEPSY WITH COMPLEX PARTIAL SEIZURES (HCC): Primary | ICD-10-CM

## 2019-12-10 PROCEDURE — 99214 OFFICE O/P EST MOD 30 MIN: CPT | Performed by: PSYCHIATRY & NEUROLOGY

## 2019-12-10 NOTE — ASSESSMENT & PLAN NOTE
She has not had any additional definite seizures since her last appointment  She does report 2 mild episodes of an odd sensation, without any alteration of consciousness, which she is concerned may be auras of her seizures  These episodes were different from prior seizures, so it is not clear if these are definitely epileptic seizures or not  Either way, I would like to avoid any additional episodes, so I will have her increase her dose of Brivaracetam, as below  Ultimately would be nice to try to simplify some of her other medications, but I would like to avoid any medication changes until she has had a longer period of time being seizure-free  We extensively discussed the results of her epilepsy surgery evaluation  Overall, her seizures do appear to be arising from her right hemisphere, likely the right posterior temporal area  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, and this may be amenable to surgery  That being said, because of its location and because of the extent of her prior resection, any additional resection may result and visual field changes, which also could preclude her from being able to drive  The main goal of her seizure-freedom is for her to return to driving, so is unclear if surgery would have a significant impact on her quality of life  We agreed that overall, she is currently seizure-free, so we will not make any further moves to pursue surgery unless she would start having additional seizures  --she will continue lacosamide, zonisamide, and lamotrigine unchanged  I will have her increase Brivaracetam to be 75 mg twice a day  I discussed the risks rationale this increase  If she continues to be seizure-free, we could consider possibly decreasing zonisamide in the future, since this was not particularly helpful when it was added  --I will hold on any additional evaluation for epilepsy surgery unless she would have more frequent seizures    We discussed the possibility of getting visual field testing, which will be needed before doing intracranial EEG, but she again preferred to wait on this until such a time that she would have additional seizures

## 2019-12-10 NOTE — PATIENT INSTRUCTIONS
-- Please increase your Briviact (Brivaracetam) to be 75 mg twice a day  -- continue your other seizure medications unchanged

## 2019-12-11 DIAGNOSIS — G40.209 LOCALIZATION-RELATED FOCAL EPILEPSY WITH COMPLEX PARTIAL SEIZURES (HCC): ICD-10-CM

## 2020-02-26 NOTE — TELEPHONE ENCOUNTER
It looks like I have an opening 4/17 at noon in [place] or 4/19 in the morning in [place]   If not, I could likely see her first week of May 
Noted
Pt accepted the appt for tomorrow, [facility] for Harry Ryan to make her aware 
pt called and states that she had a seizure at 1:30 this afternoon  lasted about 5 min  felt weird, walked over to her boss, they walked her outside,  she does not remember walking outside  outside she had a staring spell and ems was called  pt was standing the entire time  she walked on her own   just dc'd from ER   back to baseline now only a slight headache and l leg is sore  no missed meds, only new med is protonix, some stress, waking up at 1am thinking that she slept the whole night, able to go back to asleep then wake up at 4 and 5 am then gets up at 6am   no illness  no etoh/drugs  lamictal 200mg 1 5tabs am and 2 tabs evening  zonisamide 100mg 2 tabs in am and 2 tabs evening  er told her to call office, they recommended an appt  she has an appt 5/16 with you   would you like to see her sooner  please advise      [phone]
negative...

## 2020-03-09 ENCOUNTER — TELEPHONE (OUTPATIENT)
Dept: ADMINISTRATIVE | Facility: OTHER | Age: 60
End: 2020-03-09

## 2020-03-09 NOTE — TELEPHONE ENCOUNTER
Upon review of the In Basket request we were able to locate, review, and update the patient chart as requested for CRC: Colonoscopy  Please note note, No recall timeframe was documented  Modifier left at 10 years  Any additional questions or concerns should be emailed to the Practice Liaisons via [email or web address] email, please do not reply via In Basket      Thank you  Zehra Reese

## 2020-03-09 NOTE — TELEPHONE ENCOUNTER
----- Message from Kiana Padron, [address] sent at 12/10/2019  9:19 AM EST -----  Regarding: Colonoscopy  Contact: [phone]  12/10/19 9:20 AM     Hello, our patient Rhonda Collins had a [facility] Screening  Completed/performed by [facility] about 8 years ago at [employer]  Please assist in updating the patient chart by updating patient health maintenance    Thank you,   Heena Mccall MA   No information on file

## 2020-03-09 NOTE — LETTER
Procedure Request Form: Colonoscopy      Date Requested: 20  Patient: Stevphen Ganser Handlon  Patient : 1960   Referring Provider: Jc Almanzar MD        Date of Procedure ______________________________       The above patient has informed us that they have completed their   most recent Colonoscopy at your facility  Please complete   this form and attach all corresponding procedure reports/results  Comments __CRC Screening ________________________________________________________  ____________________________________________________________________  ____________________________________________________________________  ____________________________________________________________________    Facility Completing Procedure _________________________________________    Form Completed By (print name) _______________________________________      Signature __________________________________________________________      These reports are needed for  compliance    Please fax this completed form and a copy of the procedure report to our office located at [place] [address] as soon as possible to [phone] tahira Sue Torito: Phone [phone]    We thank you for your assistance in treating our mutual patient

## 2020-03-09 NOTE — TELEPHONE ENCOUNTER
Upon review of the In Basket request and the patient's chart, initial outreach has been made via fax, please see Contacts section for details  A second outreach attempt will be made within 3 business days      Thank you  Emily Maldonado

## 2020-03-16 DIAGNOSIS — G40.209 LOCALIZATION-RELATED FOCAL EPILEPSY WITH COMPLEX PARTIAL SEIZURES (HCC): ICD-10-CM

## 2020-03-16 NOTE — TELEPHONE ENCOUNTER
Patient left vm asking for refill of Brivaracetam sent to [facility] for your review   Please sign off if agreeable    [phone]

## 2020-03-26 NOTE — PROGRESS NOTES
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, , who is returning to Neurology office for follow up of her seizures  Assessment/Plan:    Localization-related focal epilepsy with complex partial seizures ([facility] [address] )  She has not had any additional seizures since her last appointment  I discussed that she overall seems to be tolerating her medications rather well, and I would be reluctant to decrease her medications since she is still working and driving, and has had a longer period of time without any seizures currently  I would like to keep her medications unchanged, but is possible that her sleepiness in the evenings may be from her medications  She does take them at 6:00 a m  In the evening, which is 11 hours from her morning dose  I discussed that it would be reasonable to delay taking her evening dose until closer to bedtime  If she would have any increased sleepiness, this would likely if anything help her sleep overnight, and by changing the timing, hopefully avoid any excessive sleepiness before her typical bedtime  --she will continue to take her medication doses unchanged, but will take her evening doses later in the evening, as above  If she continues to have difficulty with sleepiness, then we may need to have her evaluated with a sleep study  --if she would have additional seizures, we could increase her dose of Briviact    --although we had been evaluating her for the possibility of epilepsy surgery, because she is now currently seizure free, there would be no additional benefit to pursuing surgery at this time      I spent a total of 25 min with the patient with greater than 50% of that time spent counseling and coordinating her care, specifically discussing her diagnosis, medications, and plan, as detailed above     She will Return in about 6 months (around 9/30/2020)  Subjective:    HPI  Current seizure medications:  1  Lacosamide 100 mg in the morning and 200 mg at night  2  Briviact 75 mg twice a day  3  Zonisamide 100 mg in the morning and 200 mg at night  4  Lamotrigine 200 mg twice a day   Other medications as per Epic  I last saw her in the office on 12/10/2019  At that time, she was doing well without any recurrent seizures, only reporting 2 mild episodes of an odd sensation which she felt may be auras, but no larger seizures, so no changes were made to her medications  Since her last visit, she has continued to do well and denies any seizures including denying any auras or little feelings as if she is about to have a seizure  She has been tolerating her medications okay  She continues to need to take her medications in the morning with food to avoid double vision, but in the mornings typically this is not an issue as she takes it with food  In the evenings, she does still feel very sleepy in the evenings  She typically takes her evening dose of medications around 6:00 p m , and will have difficulty staying up till her normal bedtime around 9-10 o'clock  If she goes to bed too early, she will be up throughout the night  She does have some difficulty with sleep overnight  Prior Seizure Medications: Phentoin (changed due to potential long-term side effects), Levetiracetam (behavioral changes)    The following portions of the patient's history were reviewed and updated as appropriate: allergies, current medications, past medical history and problem list      Objective:    Blood pressure 129/73, pulse 86, height 5' 6" (1 676 m), weight 72 1 kg (159 lb)  Physical Exam    Neurological Exam      ROS:    Review of Systems   Review of Systems   Constitutional: Positive for fatigue   Negative for appetite change and fever    HENT: Negative  Negative for hearing loss, tinnitus, trouble swallowing and voice change  Eyes: Negative  Negative for photophobia and pain  Respiratory: Negative  Negative for shortness of breath  Cardiovascular: Negative  Negative for palpitations  Gastrointestinal: Negative  Negative for nausea and vomiting  Endocrine: Negative  Negative for cold intolerance  Genitourinary: Negative  Negative for dysuria, frequency and urgency  Musculoskeletal: Negative  Negative for myalgias and neck pain  Skin: Negative  Negative for rash  Neurological: Negative  Negative for dizziness, tremors, seizures, syncope, facial asymmetry, speech difficulty, weakness, light-headedness, numbness and headaches  Hematological: Negative  Does not bruise/bleed easily  Psychiatric/Behavioral: Positive for sleep disturbance  Negative for confusion and hallucinations       I personally reviewed the review of systems that was entered by the medical assistant

## 2020-03-30 ENCOUNTER — OFFICE VISIT (OUTPATIENT)
Dept: NEUROLOGY | Facility: CLINIC | Age: 60
End: 2020-03-30
Payer: COMMERCIAL

## 2020-03-30 VITALS
WEIGHT: 159 LBS | HEIGHT: 66 IN | HEART RATE: 86 BPM | DIASTOLIC BLOOD PRESSURE: 73 MMHG | SYSTOLIC BLOOD PRESSURE: 129 MMHG | BODY MASS INDEX: 25.55 KG/M2

## 2020-03-30 DIAGNOSIS — Q04.3 POLYMICROGYRIA (HCC): ICD-10-CM

## 2020-03-30 DIAGNOSIS — G40.209 LOCALIZATION-RELATED FOCAL EPILEPSY WITH COMPLEX PARTIAL SEIZURES (HCC): ICD-10-CM

## 2020-03-30 PROCEDURE — 99214 OFFICE O/P EST MOD 30 MIN: CPT | Performed by: PSYCHIATRY & NEUROLOGY

## 2020-03-30 RX ORDER — LACOSAMIDE 100 MG/1
TABLET ORAL
Qty: 270 TABLET | Refills: 1 | Status: SHIPPED | OUTPATIENT
Start: 2020-03-30 | End: 2020-09-22 | Stop reason: SDUPTHER

## 2020-03-30 RX ORDER — LAMOTRIGINE 200 MG/1
TABLET ORAL
Qty: 180 TABLET | Refills: 3 | Status: SHIPPED | OUTPATIENT
Start: 2020-03-30 | End: 2020-09-22 | Stop reason: SDUPTHER

## 2020-03-30 RX ORDER — ZONISAMIDE 100 MG/1
CAPSULE ORAL
Qty: 270 CAPSULE | Refills: 3 | Status: SHIPPED | OUTPATIENT
Start: 2020-03-30 | End: 2020-09-22 | Stop reason: SDUPTHER

## 2020-03-30 NOTE — PATIENT INSTRUCTIONS
-- I will keep your medication dosing the same, but start taking the evening dose of your medications later in the evening, closer to bedtime  -- Keep track of your sleepiness, if this persists, we could consider getting a sleep study

## 2020-03-30 NOTE — ASSESSMENT & PLAN NOTE
She has not had any additional seizures since her last appointment  I discussed that she overall seems to be tolerating her medications rather well, and I would be reluctant to decrease her medications since she is still working and driving, and has had a longer period of time without any seizures currently  I would like to keep her medications unchanged, but is possible that her sleepiness in the evenings may be from her medications  She does take them at 6:00 a m  In the evening, which is 11 hours from her morning dose  I discussed that it would be reasonable to delay taking her evening dose until closer to bedtime  If she would have any increased sleepiness, this would likely if anything help her sleep overnight, and by changing the timing, hopefully avoid any excessive sleepiness before her typical bedtime  --she will continue to take her medication doses unchanged, but will take her evening doses later in the evening, as above  If she continues to have difficulty with sleepiness, then we may need to have her evaluated with a sleep study  --if she would have additional seizures, we could increase her dose of Briviact    --although we had been evaluating her for the possibility of epilepsy surgery, because she is now currently seizure free, there would be no additional benefit to pursuing surgery at this time

## 2020-03-30 NOTE — PROGRESS NOTES
Review of Systems   Constitutional: Positive for fatigue  Negative for appetite change and fever  HENT: Negative  Negative for hearing loss, tinnitus, trouble swallowing and voice change  Eyes: Negative  Negative for photophobia and pain  Respiratory: Negative  Negative for shortness of breath  Cardiovascular: Negative  Negative for palpitations  Gastrointestinal: Negative  Negative for nausea and vomiting  Endocrine: Negative  Negative for cold intolerance  Genitourinary: Negative  Negative for dysuria, frequency and urgency  Musculoskeletal: Negative  Negative for myalgias and neck pain  Skin: Negative  Negative for rash  Neurological: Negative  Negative for dizziness, tremors, seizures, syncope, facial asymmetry, speech difficulty, weakness, light-headedness, numbness and headaches  Hematological: Negative  Does not bruise/bleed easily  Psychiatric/Behavioral: Positive for sleep disturbance  Negative for confusion and hallucinations

## 2020-07-12 DIAGNOSIS — G40.209 LOCALIZATION-RELATED FOCAL EPILEPSY WITH COMPLEX PARTIAL SEIZURES (HCC): ICD-10-CM

## 2020-09-21 NOTE — PROGRESS NOTES
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, who is returning to Neurology office for follow up of her seizures  Assessment/Plan:    Localization-related focal epilepsy with complex partial seizures ([facility] [address] )  She continues to be without any recurrent seizures since her last appointment, also denying any auras  She has been tolerating her medications better now that she is taking it with her meals and is more compliant with this change as well  Overall, she is doing much better  She is caring for her elderly uncle, and needs to drive to be responsive to his needs, so is not interested in changing her medications right now  -- she will continue to take her current seizure medications unchanged  Although we could consider trying to simplify her regimen at some point, she is still driving and working, so ramifications of a recurrent seizure would be high for her  She is not having trouble with side effects, so there is no urgency to try to change her medications  She will Return in about 1 year (around 9/22/2021)  Subjective:    HPI  Current seizure medications:  1  Lacosamide 100 mg in the morning and 200 mg at night  2  Briviact 75 mg twice a day  3  Zonisamide 100 mg in the morning and 200 mg at night  4  Lamotrigine 200 mg twice a day   Other medications as per Epic  Since her last visit, she denies any auras or any other seizures  She has been doing well with her last focal unaware seizure occurring in March of 2019  She has started to takes taking her medications with breakfast and dinner  She feels less sleepy with taking her medications with food   She has not had any additional auras or feelings as if she is about to have a seizure  Overall, she has been doing much better    Prior Seizure Medications: Phentoin (changed due to potential long-term side effects), Levetiracetam (behavioral changes)    The following portions of the patient's history were reviewed and updated as appropriate: allergies, current medications, past medical history and problem list      Objective:    Blood pressure 126/63, pulse 72, temperature 98 °F (36 7 °C), height 5' 6" (1 676 m), weight 72 6 kg (160 lb)  Physical Exam    Neurological Exam      ROS:    Review of Systems   Constitutional: Negative  Negative for appetite change and fever  HENT: Negative  Negative for hearing loss, tinnitus, trouble swallowing and voice change  Eyes: Negative  Negative for photophobia and pain  Respiratory: Negative  Negative for shortness of breath  Cardiovascular: Negative  Negative for palpitations  Gastrointestinal: Negative  Negative for nausea and vomiting  Endocrine: Negative  Negative for cold intolerance  Genitourinary: Negative  Negative for dysuria, frequency and urgency  Musculoskeletal: Negative  Negative for myalgias and neck pain  Skin: Negative  Negative for rash  Allergic/Immunologic: Negative  Neurological: Negative  Negative for dizziness, tremors, seizures, syncope, facial asymmetry, speech difficulty, weakness, light-headedness, numbness and headaches  Hematological: Negative  Does not bruise/bleed easily  Psychiatric/Behavioral: Negative  Negative for confusion, hallucinations and sleep disturbance         I personally reviewed the review of systems that was entered by the medical assistant

## 2020-09-22 ENCOUNTER — OFFICE VISIT (OUTPATIENT)
Dept: NEUROLOGY | Facility: CLINIC | Age: 60
End: 2020-09-22
Payer: COMMERCIAL

## 2020-09-22 VITALS
HEART RATE: 72 BPM | BODY MASS INDEX: 25.71 KG/M2 | DIASTOLIC BLOOD PRESSURE: 63 MMHG | SYSTOLIC BLOOD PRESSURE: 126 MMHG | TEMPERATURE: 98 F | HEIGHT: 66 IN | WEIGHT: 160 LBS

## 2020-09-22 DIAGNOSIS — Q04.3 POLYMICROGYRIA (HCC): ICD-10-CM

## 2020-09-22 DIAGNOSIS — G40.209 LOCALIZATION-RELATED FOCAL EPILEPSY WITH COMPLEX PARTIAL SEIZURES (HCC): ICD-10-CM

## 2020-09-22 PROCEDURE — 99213 OFFICE O/P EST LOW 20 MIN: CPT | Performed by: PSYCHIATRY & NEUROLOGY

## 2020-09-22 RX ORDER — LACOSAMIDE 100 MG/1
TABLET ORAL
Qty: 270 TABLET | Refills: 1 | Status: SHIPPED | OUTPATIENT
Start: 2020-09-22 | End: 2021-05-03 | Stop reason: SDUPTHER

## 2020-09-22 RX ORDER — LAMOTRIGINE 200 MG/1
TABLET ORAL
Qty: 180 TABLET | Refills: 3 | Status: SHIPPED | OUTPATIENT
Start: 2020-09-22 | End: 2021-02-26 | Stop reason: SDUPTHER

## 2020-09-22 RX ORDER — ZONISAMIDE 100 MG/1
CAPSULE ORAL
Qty: 270 CAPSULE | Refills: 3 | Status: SHIPPED | OUTPATIENT
Start: 2020-09-22 | End: 2021-02-26 | Stop reason: SDUPTHER

## 2020-09-22 RX ORDER — BRIVARACETAM 75 MG/1
TABLET, FILM COATED ORAL
Qty: 180 TABLET | Refills: 1 | Status: SHIPPED | OUTPATIENT
Start: 2020-09-22 | End: 2021-05-03 | Stop reason: SDUPTHER

## 2020-09-22 NOTE — PATIENT INSTRUCTIONS
-- Please continue your medications unchanged  -- Please call our office if any issues or problems arise

## 2020-09-22 NOTE — ASSESSMENT & PLAN NOTE
She continues to be without any recurrent seizures since her last appointment, also denying any auras  She has been tolerating her medications better now that she is taking it with her meals and is more compliant with this change as well  Overall, she is doing much better  She is caring for her elderly uncle, and needs to drive to be responsive to his needs, so is not interested in changing her medications right now  -- she will continue to take her current seizure medications unchanged  Although we could consider trying to simplify her regimen at some point, she is still driving and working, so ramifications of a recurrent seizure would be high for her  She is not having trouble with side effects, so there is no urgency to try to change her medications

## 2021-02-26 DIAGNOSIS — Q04.3 POLYMICROGYRIA (HCC): ICD-10-CM

## 2021-02-26 DIAGNOSIS — G40.209 LOCALIZATION-RELATED FOCAL EPILEPSY WITH COMPLEX PARTIAL SEIZURES (HCC): ICD-10-CM

## 2021-02-26 RX ORDER — LAMOTRIGINE 200 MG/1
TABLET ORAL
Qty: 180 TABLET | Refills: 3 | Status: SHIPPED | OUTPATIENT
Start: 2021-02-26 | End: 2021-09-07 | Stop reason: SDUPTHER

## 2021-02-26 RX ORDER — ZONISAMIDE 100 MG/1
CAPSULE ORAL
Qty: 270 CAPSULE | Refills: 3 | Status: SHIPPED | OUTPATIENT
Start: 2021-02-26 | End: 2021-09-07 | Stop reason: SDUPTHER

## 2021-02-26 NOTE — TELEPHONE ENCOUNTER
Received a fax from [facility] requesting a refill on Lamotrigine 200 mg tab qty 180 ,and Zonisamide 100 mg cap qty 270 prescription has  for her Lamotrigine 200 mg tab,and Zonisamide 100 mg cap, LOV:  20 next OV: 21

## 2021-03-10 DIAGNOSIS — Z23 ENCOUNTER FOR IMMUNIZATION: ICD-10-CM

## 2021-05-03 DIAGNOSIS — G40.209 LOCALIZATION-RELATED FOCAL EPILEPSY WITH COMPLEX PARTIAL SEIZURES (HCC): ICD-10-CM

## 2021-05-04 RX ORDER — BRIVARACETAM 75 MG/1
TABLET, FILM COATED ORAL
Qty: 180 TABLET | Refills: 1 | Status: SHIPPED | OUTPATIENT
Start: 2021-05-04 | End: 2021-09-07 | Stop reason: SDUPTHER

## 2021-05-04 RX ORDER — LACOSAMIDE 100 MG/1
TABLET ORAL
Qty: 270 TABLET | Refills: 1 | Status: SHIPPED | OUTPATIENT
Start: 2021-05-04 | End: 2021-09-07 | Stop reason: SDUPTHER

## 2021-05-18 ENCOUNTER — TELEPHONE (OUTPATIENT)
Dept: NEUROLOGY | Facility: CLINIC | Age: 61
End: 2021-05-18

## 2021-08-27 ENCOUNTER — TELEPHONE (OUTPATIENT)
Dept: NEUROLOGY | Facility: CLINIC | Age: 61
End: 2021-08-27

## 2021-09-07 ENCOUNTER — OFFICE VISIT (OUTPATIENT)
Dept: NEUROLOGY | Facility: CLINIC | Age: 61
End: 2021-09-07
Payer: COMMERCIAL

## 2021-09-07 VITALS
DIASTOLIC BLOOD PRESSURE: 63 MMHG | HEIGHT: 66 IN | WEIGHT: 166 LBS | SYSTOLIC BLOOD PRESSURE: 125 MMHG | HEART RATE: 69 BPM | BODY MASS INDEX: 26.68 KG/M2

## 2021-09-07 DIAGNOSIS — Q04.3 POLYMICROGYRIA (HCC): ICD-10-CM

## 2021-09-07 DIAGNOSIS — G40.209 LOCALIZATION-RELATED FOCAL EPILEPSY WITH COMPLEX PARTIAL SEIZURES (HCC): ICD-10-CM

## 2021-09-07 PROCEDURE — 99214 OFFICE O/P EST MOD 30 MIN: CPT | Performed by: PSYCHIATRY & NEUROLOGY

## 2021-09-07 RX ORDER — ZONISAMIDE 100 MG/1
CAPSULE ORAL
Qty: 270 CAPSULE | Refills: 3 | Status: SHIPPED | OUTPATIENT
Start: 2021-09-07 | End: 2021-09-07 | Stop reason: SDUPTHER

## 2021-09-07 RX ORDER — LAMOTRIGINE 200 MG/1
TABLET ORAL
Qty: 180 TABLET | Refills: 3 | Status: SHIPPED | OUTPATIENT
Start: 2021-09-07

## 2021-09-07 RX ORDER — LACOSAMIDE 100 MG/1
TABLET ORAL
Qty: 270 TABLET | Refills: 1 | Status: SHIPPED | OUTPATIENT
Start: 2021-09-07 | End: 2021-09-07 | Stop reason: SDUPTHER

## 2021-09-07 RX ORDER — LAMOTRIGINE 200 MG/1
TABLET ORAL
Qty: 180 TABLET | Refills: 3 | Status: SHIPPED | OUTPATIENT
Start: 2021-09-07 | End: 2021-09-07 | Stop reason: SDUPTHER

## 2021-09-07 RX ORDER — ZONISAMIDE 100 MG/1
CAPSULE ORAL
Qty: 270 CAPSULE | Refills: 3 | Status: SHIPPED | OUTPATIENT
Start: 2021-09-07

## 2021-09-07 RX ORDER — BRIVARACETAM 75 MG/1
TABLET, FILM COATED ORAL
Qty: 180 TABLET | Refills: 1 | Status: SHIPPED | OUTPATIENT
Start: 2021-09-07 | End: 2021-09-07 | Stop reason: SDUPTHER

## 2021-09-07 RX ORDER — LACOSAMIDE 100 MG/1
TABLET ORAL
Qty: 270 TABLET | Refills: 1 | Status: SHIPPED | OUTPATIENT
Start: 2021-09-07 | End: 2022-01-11 | Stop reason: SDUPTHER

## 2021-09-07 RX ORDER — BRIVARACETAM 75 MG/1
TABLET, FILM COATED ORAL
Qty: 180 TABLET | Refills: 1 | Status: SHIPPED | OUTPATIENT
Start: 2021-09-07 | End: 2022-01-11 | Stop reason: SDUPTHER

## 2021-09-07 NOTE — PROGRESS NOTES
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, who is returning to Neurology office for follow up of her seizures  Assessment/Plan:    Localization-related focal epilepsy with complex partial seizures ([address] )    She did have 1  Focal aware seizure (simple partial seizure), which by definition was without any alteration of consciousness and did not progress any further to any larger seizure  This occurred in the setting of being more sleep deprived while caring for her uncle  She has not had any other events concerning for seizures or larger seizures since her last appointment and has been stable on medications for a prolonged period of time at this point  Because she is overall doing well, I would not recommend any changes to her medications  We did discuss seizure triggers and provocative things such as sleep deprivation and the importance of making sure she is getting regular sleep been taking her medications on schedule  --she will continue her current seizure medications unchanged  If she would have additional seizures, we could increase her dose of Briviact  At some point, we could consider trying to simplify her regimen since she is on 4 medications, but she was not interested in making any changes today since she has been doing well  She will Return in about 1 year (around 9/7/2022)  Subjective:    HPI   Current seizure medications:  1  Lacosamide 100 mg in the morning and 200 mg at night  2  Briviact 75 mg twice a day  3  Zonisamide 100 mg in the morning and 200 mg at night  4  Lamotrigine 200 mg twice a day   Other medications as per Epic      Since her last visit, she had one episode where she felt an intense aura where she felt upset and as if an event was about to happen, but it went away really quickly without progressing to anything else  She had been getting a lot less sleep especially around that time due to caring for her uncle who has some health issues    She denies any side effects to her medications  Prior Medications: Phentoin (changed due to potential long-term side effects), Levetiracetam (behavioral changes)       Objective:    Blood pressure 125/63, pulse 69, height 5' 6" (1 676 m), weight 75 3 kg (166 lb)  Physical Exam    Neurological Exam      ROS:    Review of Systems   Constitutional: Negative  Negative for appetite change and fever  HENT: Negative  Negative for hearing loss, tinnitus, trouble swallowing and voice change  Eyes: Negative  Negative for photophobia and pain  Respiratory: Negative  Negative for shortness of breath  Cardiovascular: Negative  Negative for palpitations  Gastrointestinal: Negative  Negative for nausea and vomiting  Endocrine: Negative  Negative for cold intolerance  Genitourinary: Negative  Negative for dysuria, frequency and urgency  Musculoskeletal: Negative  Negative for myalgias and neck pain  Skin: Negative  Negative for rash  Neurological: Positive for tremors (tremors on one day since last visit') and seizures (felt an aura but did not have a complete seizure)  Negative for dizziness, syncope, facial asymmetry, speech difficulty, weakness, light-headedness, numbness and headaches  Hematological: Negative  Does not bruise/bleed easily  Psychiatric/Behavioral: Negative  Negative for confusion, hallucinations and sleep disturbance  All other systems reviewed and are negative          I personally reviewed the ROS that was entered by the medical assistant

## 2021-09-07 NOTE — ASSESSMENT & PLAN NOTE
She did have 1  Focal aware seizure (simple partial seizure), which by definition was without any alteration of consciousness and did not progress any further to any larger seizure  This occurred in the setting of being more sleep deprived while caring for her uncle  She has not had any other events concerning for seizures or larger seizures since her last appointment and has been stable on medications for a prolonged period of time at this point  Because she is overall doing well, I would not recommend any changes to her medications  We did discuss seizure triggers and provocative things such as sleep deprivation and the importance of making sure she is getting regular sleep been taking her medications on schedule  --she will continue her current seizure medications unchanged  If she would have additional seizures, we could increase her dose of Briviact  At some point, we could consider trying to simplify her regimen since she is on 4 medications, but she was not interested in making any changes today since she has been doing well

## 2021-09-07 NOTE — PATIENT INSTRUCTIONS
-- Continue to take your seizure medications unchanged  -- Keep track of any additional events for your next appointment and call our office if any problems arise

## 2022-01-11 DIAGNOSIS — G40.209 LOCALIZATION-RELATED FOCAL EPILEPSY WITH COMPLEX PARTIAL SEIZURES (HCC): ICD-10-CM

## 2022-01-11 RX ORDER — BRIVARACETAM 75 MG/1
TABLET, FILM COATED ORAL
Qty: 180 TABLET | Refills: 1 | Status: SHIPPED | OUTPATIENT
Start: 2022-01-11 | End: 2022-01-17 | Stop reason: SDUPTHER

## 2022-01-11 RX ORDER — LACOSAMIDE 100 MG/1
TABLET ORAL
Qty: 270 TABLET | Refills: 1 | Status: SHIPPED | OUTPATIENT
Start: 2022-01-11 | End: 2022-01-19 | Stop reason: SDUPTHER

## 2022-01-11 NOTE — TELEPHONE ENCOUNTER
----- Message from Tom Barraza RN sent at 1/11/2022  8:13 AM EST -----  Regarding: FW: New Pharmacy & refills needed    ----- Message -----  From: Fortino Prescott  Sent: 1/10/2022   9:38 PM EST  To: Neurology [facility]  Subject: [id number] [address] & refills needed                    Good morning:  My new mail-in service is with [facility]  [facility] Walignacio didnt transfer any prescriptions for Briviact or Vimpat  I need refills for both      Briviact 75 mg 2x per day  Vimpat 100 mg 100 mg am  200 mg pm    Thanks,  Fortino Cleveland

## 2022-01-13 ENCOUNTER — TELEPHONE (OUTPATIENT)
Dept: NEUROLOGY | Facility: CLINIC | Age: 62
End: 2022-01-13

## 2022-01-13 NOTE — TELEPHONE ENCOUNTER
----- Message from Bon Rivera RN sent at 1/13/2022  7:53 AM EST -----  Regarding: FW: New Pharmacy & refills needed    ----- Message -----  From: Carlos Prescott  Sent: 1/12/2022   8:46 PM EST  To: Neurology [facility]  Subject: [id number] [address] & refills needed                    Good morning:  [facility] my prescription for Briviact until 1/15, when it can be refilled  I thought I had more on hand than I do  I anticipate running out this Sunday, which makes me wonder why they are holding things up  I know that they are hard to get  Can you scrounge some up to fill in the gap, please? Apparently, I have to get some sort of authorization for the Vimpat, but I have plenty of that, for the time being     Thanks,  Carlos Gonsalez

## 2022-01-13 NOTE — TELEPHONE ENCOUNTER
PA submitted for VIMPAT  Key: [id number]  at [id number]  Call placed to [facility]  [phone]  Medication is scheduled to be processed 1/14/2022  Will take approx 5-7 days to get to patient  Can overnight but it will cost patient an extra $21      [phone] [facility]      Message left for patient to return call to office

## 2022-01-17 DIAGNOSIS — G40.209 LOCALIZATION-RELATED FOCAL EPILEPSY WITH COMPLEX PARTIAL SEIZURES (HCC): ICD-10-CM

## 2022-01-17 RX ORDER — BRIVARACETAM 75 MG/1
TABLET, FILM COATED ORAL
Qty: 14 TABLET | Refills: 0 | Status: SHIPPED | OUTPATIENT
Start: 2022-01-17 | End: 2022-01-17 | Stop reason: SDUPTHER

## 2022-01-17 NOTE — TELEPHONE ENCOUNTER
Valentin Landis  to Kristan Guajardo MD        4:57 PM  I did everything as I was supposed to  I called first thing Friday and asked for overnight delivery  I have checked on it Friday night, Saturday night, and about 10 minutes ago  The medicine has not yet shipped  Each time they say that I wont have it until the 21st   They review the notes and see the overnight shipping, but still cant get it through the system  I will be without Briviact all day Monday  Delivery is SUPPOSED to occur on Tuesday  [facility] is Suggesting that my doctors office place a short term prescription to carry me through  You would have to notify [facility] so that they can do an override  HELP

## 2022-01-17 NOTE — TELEPHONE ENCOUNTER
Call placed to [facility]  [phone]  Spoke to [facility]  requested 1 week supply to local pharmacy due to mail order not arriving in time  Will reject at local pharmacy, pharmacy needs to call for mail order override  Call placed to patient to confirm local pharmacy as multiple on file  No answer  Requested a return call to office

## 2022-01-18 RX ORDER — BRIVARACETAM 75 MG/1
TABLET, FILM COATED ORAL
Qty: 14 TABLET | Refills: 0 | Status: SHIPPED | OUTPATIENT
Start: 2022-01-18 | End: 2022-06-23 | Stop reason: SDUPTHER

## 2022-01-19 RX ORDER — LACOSAMIDE 100 MG/1
TABLET ORAL
Qty: 270 TABLET | Refills: 1 | Status: SHIPPED | OUTPATIENT
Start: 2022-01-19 | End: 2022-06-23 | Stop reason: SDUPTHER

## 2022-01-19 NOTE — TELEPHONE ENCOUNTER
Patient sent [employer] message stated Staciekaylee Less now requires new script for medication (vimpat)  Call placed to patient  Script is needed bc "price was negotiated" with insurance  Please sign off on new vimpat script

## 2022-03-09 ENCOUNTER — TELEPHONE (OUTPATIENT)
Dept: NEUROLOGY | Facility: CLINIC | Age: 62
End: 2022-03-09

## 2022-03-09 NOTE — TELEPHONE ENCOUNTER
Fax received from [facility]  HENRIK douglas PA renewal      Key: [id number]  PA submitted  Awaiting response from [facility]  [facility]: [phone]

## 2022-03-11 NOTE — TELEPHONE ENCOUNTER
Fax received back from plan  Current PA on file that does not  until 3/18/2022  Will need to resubmit closer to that date

## 2022-04-28 ENCOUNTER — HOSPITAL ENCOUNTER (INPATIENT)
Facility: HOSPITAL | Age: 62
LOS: 3 days | Discharge: NURSING, REHAB OR LONG-TERM CARE | DRG: 563 | End: 2022-05-03
Attending: EMERGENCY MEDICINE | Admitting: INTERNAL MEDICINE
Payer: COMMERCIAL

## 2022-04-28 ENCOUNTER — APPOINTMENT (EMERGENCY)
Dept: RADIOLOGY | Facility: HOSPITAL | Age: 62
DRG: 563 | End: 2022-04-28
Payer: COMMERCIAL

## 2022-04-28 DIAGNOSIS — S82.852A TRIMALLEOLAR FRACTURE OF ANKLE, CLOSED, LEFT, INITIAL ENCOUNTER: Primary | ICD-10-CM

## 2022-04-28 PROCEDURE — 29515 APPLICATION SHORT LEG SPLINT: CPT

## 2022-04-28 PROCEDURE — 99285 EMERGENCY DEPT VISIT HI MDM: CPT

## 2022-04-28 PROCEDURE — 73610 X-RAY EXAM OF ANKLE: CPT

## 2022-04-29 PROBLEM — S82.852A TRIMALLEOLAR FRACTURE OF ANKLE, CLOSED, LEFT, INITIAL ENCOUNTER: Status: ACTIVE | Noted: 2022-04-29

## 2022-04-29 LAB
ALBUMIN SERPL BCP-MCNC: 4.1 G/DL (ref 3.5–5)
ALP SERPL-CCNC: 81 U/L (ref 46–116)
ALT SERPL W P-5'-P-CCNC: 21 U/L (ref 12–78)
ANION GAP SERPL CALCULATED.3IONS-SCNC: 8 MMOL/L (ref 4–13)
ANION GAP SERPL CALCULATED.3IONS-SCNC: 9 MMOL/L (ref 4–13)
AST SERPL W P-5'-P-CCNC: 15 U/L (ref 5–45)
BASOPHILS # BLD AUTO: 0.03 THOUSANDS/ΜL (ref 0–0.1)
BASOPHILS # BLD AUTO: 0.03 THOUSANDS/ΜL (ref 0–0.1)
BASOPHILS NFR BLD AUTO: 0 % (ref 0–1)
BASOPHILS NFR BLD AUTO: 1 % (ref 0–1)
BILIRUB SERPL-MCNC: 0.41 MG/DL (ref 0.2–1)
BUN SERPL-MCNC: 16 MG/DL (ref 5–25)
BUN SERPL-MCNC: 18 MG/DL (ref 5–25)
CALCIUM SERPL-MCNC: 9.1 MG/DL (ref 8.3–10.1)
CALCIUM SERPL-MCNC: 9.4 MG/DL (ref 8.3–10.1)
CHLORIDE SERPL-SCNC: 107 MMOL/L (ref 100–108)
CHLORIDE SERPL-SCNC: 108 MMOL/L (ref 100–108)
CO2 SERPL-SCNC: 24 MMOL/L (ref 21–32)
CO2 SERPL-SCNC: 26 MMOL/L (ref 21–32)
CREAT SERPL-MCNC: 0.84 MG/DL (ref 0.6–1.3)
CREAT SERPL-MCNC: 1.01 MG/DL (ref 0.6–1.3)
EOSINOPHIL # BLD AUTO: 0 THOUSAND/ΜL (ref 0–0.61)
EOSINOPHIL # BLD AUTO: 0.01 THOUSAND/ΜL (ref 0–0.61)
EOSINOPHIL NFR BLD AUTO: 0 % (ref 0–6)
EOSINOPHIL NFR BLD AUTO: 0 % (ref 0–6)
ERYTHROCYTE [DISTWIDTH] IN BLOOD BY AUTOMATED COUNT: 11.9 % (ref 11.6–15.1)
ERYTHROCYTE [DISTWIDTH] IN BLOOD BY AUTOMATED COUNT: 12 % (ref 11.6–15.1)
GFR SERPL CREATININE-BSD FRML MDRD: 60 ML/MIN/1.73SQ M
GFR SERPL CREATININE-BSD FRML MDRD: 75 ML/MIN/1.73SQ M
GLUCOSE SERPL-MCNC: 101 MG/DL (ref 65–140)
GLUCOSE SERPL-MCNC: 95 MG/DL (ref 65–140)
HCT VFR BLD AUTO: 38.1 % (ref 34.8–46.1)
HCT VFR BLD AUTO: 41.8 % (ref 34.8–46.1)
HGB BLD-MCNC: 12.4 G/DL (ref 11.5–15.4)
HGB BLD-MCNC: 13.8 G/DL (ref 11.5–15.4)
IMM GRANULOCYTES # BLD AUTO: 0.02 THOUSAND/UL (ref 0–0.2)
IMM GRANULOCYTES # BLD AUTO: 0.02 THOUSAND/UL (ref 0–0.2)
IMM GRANULOCYTES NFR BLD AUTO: 0 % (ref 0–2)
IMM GRANULOCYTES NFR BLD AUTO: 0 % (ref 0–2)
LYMPHOCYTES # BLD AUTO: 1.26 THOUSANDS/ΜL (ref 0.6–4.47)
LYMPHOCYTES # BLD AUTO: 1.58 THOUSANDS/ΜL (ref 0.6–4.47)
LYMPHOCYTES NFR BLD AUTO: 19 % (ref 14–44)
LYMPHOCYTES NFR BLD AUTO: 32 % (ref 14–44)
MCH RBC QN AUTO: 31.4 PG (ref 26.8–34.3)
MCH RBC QN AUTO: 32.5 PG (ref 26.8–34.3)
MCHC RBC AUTO-ENTMCNC: 32.5 G/DL (ref 31.4–37.4)
MCHC RBC AUTO-ENTMCNC: 33 G/DL (ref 31.4–37.4)
MCV RBC AUTO: 97 FL (ref 82–98)
MCV RBC AUTO: 98 FL (ref 82–98)
MONOCYTES # BLD AUTO: 0.38 THOUSAND/ΜL (ref 0.17–1.22)
MONOCYTES # BLD AUTO: 0.58 THOUSAND/ΜL (ref 0.17–1.22)
MONOCYTES NFR BLD AUTO: 8 % (ref 4–12)
MONOCYTES NFR BLD AUTO: 9 % (ref 4–12)
NEUTROPHILS # BLD AUTO: 3 THOUSANDS/ΜL (ref 1.85–7.62)
NEUTROPHILS # BLD AUTO: 4.81 THOUSANDS/ΜL (ref 1.85–7.62)
NEUTS SEG NFR BLD AUTO: 59 % (ref 43–75)
NEUTS SEG NFR BLD AUTO: 72 % (ref 43–75)
NRBC BLD AUTO-RTO: 0 /100 WBCS
NRBC BLD AUTO-RTO: 0 /100 WBCS
PLATELET # BLD AUTO: 203 THOUSANDS/UL (ref 149–390)
PLATELET # BLD AUTO: 207 THOUSANDS/UL (ref 149–390)
PMV BLD AUTO: 10.1 FL (ref 8.9–12.7)
PMV BLD AUTO: 9.9 FL (ref 8.9–12.7)
POTASSIUM SERPL-SCNC: 3.5 MMOL/L (ref 3.5–5.3)
POTASSIUM SERPL-SCNC: 3.7 MMOL/L (ref 3.5–5.3)
PROT SERPL-MCNC: 7.4 G/DL (ref 6.4–8.2)
RBC # BLD AUTO: 3.95 MILLION/UL (ref 3.81–5.12)
RBC # BLD AUTO: 4.25 MILLION/UL (ref 3.81–5.12)
SODIUM SERPL-SCNC: 141 MMOL/L (ref 136–145)
SODIUM SERPL-SCNC: 141 MMOL/L (ref 136–145)
WBC # BLD AUTO: 5.02 THOUSAND/UL (ref 4.31–10.16)
WBC # BLD AUTO: 6.7 THOUSAND/UL (ref 4.31–10.16)

## 2022-04-29 PROCEDURE — 97163 PT EVAL HIGH COMPLEX 45 MIN: CPT

## 2022-04-29 PROCEDURE — 99220 PR INITIAL OBSERVATION CARE/DAY 70 MINUTES: CPT | Performed by: INTERNAL MEDICINE

## 2022-04-29 PROCEDURE — 36415 COLL VENOUS BLD VENIPUNCTURE: CPT

## 2022-04-29 PROCEDURE — 97167 OT EVAL HIGH COMPLEX 60 MIN: CPT

## 2022-04-29 PROCEDURE — 80053 COMPREHEN METABOLIC PANEL: CPT

## 2022-04-29 PROCEDURE — 85025 COMPLETE CBC W/AUTO DIFF WBC: CPT

## 2022-04-29 PROCEDURE — 29515 APPLICATION SHORT LEG SPLINT: CPT | Performed by: PHYSICIAN ASSISTANT

## 2022-04-29 PROCEDURE — 99204 OFFICE O/P NEW MOD 45 MIN: CPT | Performed by: PHYSICIAN ASSISTANT

## 2022-04-29 PROCEDURE — 85025 COMPLETE CBC W/AUTO DIFF WBC: CPT | Performed by: NURSE PRACTITIONER

## 2022-04-29 PROCEDURE — 80048 BASIC METABOLIC PNL TOTAL CA: CPT | Performed by: NURSE PRACTITIONER

## 2022-04-29 RX ORDER — OXYCODONE HYDROCHLORIDE 5 MG/1
5 TABLET ORAL EVERY 4 HOURS PRN
Status: DISCONTINUED | OUTPATIENT
Start: 2022-04-29 | End: 2022-04-29

## 2022-04-29 RX ORDER — OXYCODONE HYDROCHLORIDE 5 MG/1
5 TABLET ORAL EVERY 4 HOURS PRN
Status: DISCONTINUED | OUTPATIENT
Start: 2022-04-29 | End: 2022-05-03 | Stop reason: HOSPADM

## 2022-04-29 RX ORDER — LAMOTRIGINE 100 MG/1
200 TABLET ORAL 2 TIMES DAILY
Status: DISCONTINUED | OUTPATIENT
Start: 2022-04-29 | End: 2022-05-03 | Stop reason: HOSPADM

## 2022-04-29 RX ORDER — SIMETHICONE 80 MG
80 TABLET,CHEWABLE ORAL 4 TIMES DAILY PRN
Status: DISCONTINUED | OUTPATIENT
Start: 2022-04-29 | End: 2022-05-03 | Stop reason: HOSPADM

## 2022-04-29 RX ORDER — ZONISAMIDE 100 MG/1
200 CAPSULE ORAL
Status: DISCONTINUED | OUTPATIENT
Start: 2022-04-29 | End: 2022-05-03 | Stop reason: HOSPADM

## 2022-04-29 RX ORDER — ZONISAMIDE 100 MG/1
100 CAPSULE ORAL
Status: DISCONTINUED | OUTPATIENT
Start: 2022-04-29 | End: 2022-05-01

## 2022-04-29 RX ORDER — ACETAMINOPHEN 325 MG/1
975 TABLET ORAL EVERY 6 HOURS PRN
Status: DISCONTINUED | OUTPATIENT
Start: 2022-04-29 | End: 2022-05-03 | Stop reason: HOSPADM

## 2022-04-29 RX ORDER — B-COMPLEX WITH VITAMIN C
1 TABLET ORAL
Status: DISCONTINUED | OUTPATIENT
Start: 2022-04-29 | End: 2022-05-03 | Stop reason: HOSPADM

## 2022-04-29 RX ORDER — ENOXAPARIN SODIUM 100 MG/ML
40 INJECTION SUBCUTANEOUS DAILY
Status: DISCONTINUED | OUTPATIENT
Start: 2022-04-29 | End: 2022-05-02

## 2022-04-29 RX ORDER — HYDROMORPHONE HCL IN WATER/PF 6 MG/30 ML
0.2 PATIENT CONTROLLED ANALGESIA SYRINGE INTRAVENOUS EVERY 6 HOURS PRN
Status: DISCONTINUED | OUTPATIENT
Start: 2022-04-29 | End: 2022-05-03 | Stop reason: HOSPADM

## 2022-04-29 RX ORDER — MAGNESIUM HYDROXIDE/ALUMINUM HYDROXICE/SIMETHICONE 120; 1200; 1200 MG/30ML; MG/30ML; MG/30ML
30 SUSPENSION ORAL EVERY 6 HOURS PRN
Status: DISCONTINUED | OUTPATIENT
Start: 2022-04-29 | End: 2022-05-03 | Stop reason: HOSPADM

## 2022-04-29 RX ORDER — ONDANSETRON 2 MG/ML
4 INJECTION INTRAMUSCULAR; INTRAVENOUS EVERY 6 HOURS PRN
Status: DISCONTINUED | OUTPATIENT
Start: 2022-04-29 | End: 2022-05-03 | Stop reason: HOSPADM

## 2022-04-29 RX ORDER — LACOSAMIDE 50 MG/1
100 TABLET ORAL
Status: DISCONTINUED | OUTPATIENT
Start: 2022-04-29 | End: 2022-05-01

## 2022-04-29 RX ORDER — PANTOPRAZOLE SODIUM 40 MG/1
40 TABLET, DELAYED RELEASE ORAL
Status: DISCONTINUED | OUTPATIENT
Start: 2022-04-29 | End: 2022-05-03 | Stop reason: HOSPADM

## 2022-04-29 RX ORDER — OXYCODONE HYDROCHLORIDE 5 MG/1
2.5 TABLET ORAL EVERY 4 HOURS PRN
Status: DISCONTINUED | OUTPATIENT
Start: 2022-04-29 | End: 2022-05-03 | Stop reason: HOSPADM

## 2022-04-29 RX ADMIN — ENOXAPARIN SODIUM 40 MG: 40 INJECTION SUBCUTANEOUS at 17:44

## 2022-04-29 RX ADMIN — LAMOTRIGINE 200 MG: 100 TABLET ORAL at 09:01

## 2022-04-29 RX ADMIN — LACOSAMIDE 100 MG: 50 TABLET, FILM COATED ORAL at 07:09

## 2022-04-29 RX ADMIN — ACETAMINOPHEN 975 MG: 325 TABLET ORAL at 04:12

## 2022-04-29 RX ADMIN — ACETAMINOPHEN 975 MG: 325 TABLET ORAL at 19:56

## 2022-04-29 RX ADMIN — LAMOTRIGINE 200 MG: 100 TABLET ORAL at 17:45

## 2022-04-29 RX ADMIN — PANTOPRAZOLE SODIUM 40 MG: 40 TABLET, DELAYED RELEASE ORAL at 07:09

## 2022-04-29 RX ADMIN — ZONISAMIDE 200 MG: 100 CAPSULE ORAL at 17:44

## 2022-04-29 RX ADMIN — Medication 1 TABLET: at 17:49

## 2022-04-29 RX ADMIN — LACOSAMIDE 200 MG: 150 TABLET, FILM COATED ORAL at 17:45

## 2022-04-29 RX ADMIN — ZONISAMIDE 100 MG: 100 CAPSULE ORAL at 07:09

## 2022-04-29 NOTE — ASSESSMENT & PLAN NOTE
· Elevated /86, 175/94  · Not maintained on antihypertensive medications  Likely pain induced    · bp currently stable off of meds

## 2022-04-29 NOTE — PHYSICAL THERAPY NOTE
PHYSICAL THERAPY NOTE          Patient Name: Leona MCMULLEN'S Date: 4/29/2022     Pt admitted for acute L tibia & fibular fx 2* to a fall  Ortho consult pending  Will defer PT eval at this time until cleared by Ortho   Pratik Kenney, PT

## 2022-04-29 NOTE — PROGRESS NOTES
04/29/22 1300   Clinical Encounter Type   Visited With Patient   Routine Visit Introduction   Referral From

## 2022-04-29 NOTE — PLAN OF CARE
Problem: Potential for Falls  Goal: Patient will remain free of falls  Description: INTERVENTIONS:  - Educate patient/family on patient safety including physical limitations  - Instruct patient to call for assistance with activity   - Consult OT/PT to assist with strengthening/mobility   - Keep Call bell within reach  - Keep bed low and locked with side rails adjusted as appropriate  - Keep care items and personal belongings within reach  - Initiate and maintain comfort rounds  - Make Fall Risk Sign visible to staff  - Offer Toileting every 2 Hours, in advance of need  - Initiate/Maintain bed alarm  - Obtain necessary fall risk management equipment: bedpan, bed alarm  - Apply yellow socks and bracelet for high fall risk patients  - Consider moving patient to room near nurses station  Outcome: Progressing     Problem: PAIN - ADULT  Goal: Verbalizes/displays adequate comfort level or baseline comfort level  Description: Interventions:  - Encourage patient to monitor pain and request assistance  - Assess pain using appropriate pain scale  - Administer analgesics based on type and severity of pain and evaluate response  - Implement non-pharmacological measures as appropriate and evaluate response  - Consider cultural and social influences on pain and pain management  - Notify physician/advanced practitioner if interventions unsuccessful or patient reports new pain  Outcome: Progressing    Problem: SAFETY ADULT  Goal: Patient will remain free of falls  Description: INTERVENTIONS:  - Educate patient/family on patient safety including physical limitations  - Instruct patient to call for assistance with activity   - Consult OT/PT to assist with strengthening/mobility   - Keep Call bell within reach  - Keep bed low and locked with side rails adjusted as appropriate  - Keep care items and personal belongings within reach  - Initiate and maintain comfort rounds  - Make Fall Risk Sign visible to staff  - Offer Toileting every 2 Hours, in advance of need  - Initiate/Maintain bed alarm  - Obtain necessary fall risk management equipment: bedpan, bed alarm  - Apply yellow socks and bracelet for high fall risk patients  - Consider moving patient to room near nurses station  Outcome: Progressing           Problem: DISCHARGE PLANNING  Goal: Discharge to home or other facility with appropriate resources  Description: INTERVENTIONS:  - Identify barriers to discharge w/patient and caregiver  - Arrange for needed discharge resources and transportation as appropriate  - Identify discharge learning needs (meds, wound care, etc )  - Arrange for interpretive services to assist at discharge as needed  - Refer to Case Management Department for coordinating discharge planning if the patient needs post-hospital services based on physician/advanced practitioner order or complex needs related to functional status, cognitive ability, or social support system  Outcome: Progressing     Problem: Knowledge Deficit  Goal: Patient/family/caregiver demonstrates understanding of disease process, treatment plan, medications, and discharge instructions  Description: Complete learning assessment and assess knowledge base    Interventions:  - Provide teaching at level of understanding  - Provide teaching via preferred learning methods  Outcome: Progressing

## 2022-04-29 NOTE — CASE MANAGEMENT
Case Management Assessment & Discharge Planning Note    Patient name Shobha Mathis  Location [facility] /E2 -* MRN [id number]  : 1960 Date 2022       Current Admission Date: 2022  Current Admission Diagnosis:Trimalleolar fracture of ankle, closed, left, initial encounter   Patient Active Problem List    Diagnosis Date Noted    Trimalleolar fracture of ankle, closed, left, initial encounter 2022    Cognitive changes 2018    Adjustment disorder with depressed mood 2018    GERD (gastroesophageal reflux disease) 2018    Localization-related focal epilepsy with complex partial seizures ([facility] [address] ) 2017    History of craniotomy 2016    Essential hypertension 2016      LOS (days): 0  Geometric Mean LOS (GMLOS) (days):   Days to GMLOS:     OBJECTIVE:     Current admission status: Observation     Preferred Pharmacy:   [facility]  for PERLA  [address], [address] [address]  Phone: [phone] Fax: [address]  Phone: [phone] Fax: [phone]    Primary Care Provider: Jayesh Mosher DO    Primary Insurance: BLUE CROSS  Secondary Insurance:     ASSESSMENT:  Crissy Saenz Proxies    There are no active Health Care Proxies on file  Patient Information  Admitted from[de-identified] Home  Mental Status: Alert  During Assessment patient was accompanied by: Friend  Assessment information provided by[de-identified] Patient  Primary Caregiver: Self  Support Systems: Self,Family members  Home entry access options   Select all that apply : Stairs  Number of steps to enter home : 4  Do the steps have railings?: Yes  Type of Current Residence: [place]  Living Arrangements: Lives Alone    Activities of Daily Living Prior to Admission  Functional Status: Independent  Completes ADLs independently?: Yes  Ambulates independently?: Yes  Does patient use assisted devices?: No  Does patient currently own DME?: Yes  What DME does the patient currently own?: Jenae James (Has a wide rollator and wide wheelchair from her father  Does have a standard size rollator )    Means of Transportation  Me[facility] to [facility][de-identified] Drives Self    DISCHARGE DETAILS:    Discharge planning discussed with[de-identified] Patient  Freedom of Choice: Yes  Comments - Freedom of Choice: Discharge plans TBD based on PT/OT evaluation  CM will continue to follow

## 2022-04-29 NOTE — PHYSICAL THERAPY NOTE
PT EVALUATION    Pt  Name: Sivan Bloom  Pt  Age: 62 y o  MRN: [id number]  LENGTH OF STAY: 0      Admitting Diagnoses:   Right leg injury [S89 91XA]  Trimalleolar fracture of ankle, closed, left, initial encounter [Q09 468R]    Past Medical History:   Diagnosis Date    Hypertension     Seizure ([facility] [address] )     Thyroid disease        Past Surgical History:   Procedure Laterality Date    BRAIN SURGERY      HYSTERECTOMY      OVARIAN CYST SURGERY      WISDOM TOOTH EXTRACTION         Imaging Studies:  XR ankle 3+ views LEFT   Final Result by Rm Henry MD (04/28 5825)      Fractures of the distal left fibula and tibia, as described above  Please see discussion  The images are available for clinical review  The study was marked in [facility] for immediate notification  Workstation performed: [id number]                04/29/22 1627   PT Last Visit   PT Visit Date 04/29/22   Note Type   Note type Evaluation   Pain Assessment   Pain Assessment Tool 0-10   Pain Score 3   Pain Location/Orientation Orientation: Left; Location: Leg   Hospital Pain Intervention(s) Repositioned; Ambulation/increased activity; Elevated; Emotional support; Rest   Restrictions/Precautions   Weight Bearing Precautions Per Order Yes   LLE Weight Bearing Per Order NWB   Braces or Orthoses   (L lower leg cast w/ ortho glass)   Other Precautions WBS; Fall Risk;Pain  (NWB RLE)   Home Living   Type of [address] [place] [address] One level;Performs ADLs on one level; Able to live on main level with bedroom/bathroom;Stairs to enter without rails  (1 step down to enter)   Bathroom Shower/Tub Tub/shower unit   [employer] Raised   Home Equipment Other (Comment)  (Rollator and father's transport chair)   Prior Function   Level of [place] Independent with ADLs and functional mobility   Lives With Alone   Receives Help From Family; Neighbor  (Niece and neighbors can assist if needed)   ADL Assistance Independent   IADLs Independent   Falls in the last 6 months 1 to 4  (1, this admission)   Vocational Full time employment  (desk job)   Comments Pt amb w/o AD PTA and completed all ADLs I  Pt notes john may be able to stay with her  General   Additional Pertinent History HTN, seizure, thyroid disease, previous brain surgery   Family/Caregiver Present No   Cognition   Overall Cognitive Status WFL   Arousal/Participation Alert   Attention Within functional limits   Orientation Level Oriented X4   Following Commands Follows all commands and directions without difficulty   Comments Pt pleasant and cooperative to PT session   Subjective   Subjective Pt states she is feeling alright  RUE Assessment   RUE Assessment   (Refer to OT)   LUE Assessment   LUE Assessment   (Refer to OT)   RLE Assessment   RLE Assessment WFL  (4+/5 grossly)   LLE Assessment   LLE Assessment X  (ankle not tested)   Strength LLE   L Hip Flexion 4/5  (Experienced slight pain in lower leg)   L Hip ABduction 4+/5   L Hip ADduction 4+/5   L Knee Flexion 3+/5   Coordination   Sensation X  (Numbness in arch of b/l feet which is her baseline, per pt)   Bed Mobility   Additional Comments Unable to assess 2* to pt OOB in chair at beginning and end of session  Transfers   Sit to Stand 4  Minimal assistance   Additional items Assist x 1;Verbal cues  (w/ RW)   Stand to Sit 4  Minimal assistance   Additional items Assist x 1; Armrests; Increased time required;Verbal cues  (w/ RW)   Additional Comments VCs for hand placement   Ambulation/Elevation   Gait pattern Short stride; Excessively slow  (Hopping on RLE w/ RW)   Gait Assistance 4  Minimal assist   Additional items Assist x 1;Verbal cues   Assistive Device Rolling walker   Distance 25'x1   Ambulation/Elevation Additional Comments Pt amb from bedside chair to hallway, required seated rest break in recliner after 25'   Balance   Static Sitting Normal   Dynamic Sitting Good   Static Standing Fair -   Dynamic Standing Poor + Ambulatory Poor +   Endurance Deficit   Endurance Deficit Yes   Endurance Deficit Description Required seated rest break following 25' of amb   Activity Tolerance   Activity Tolerance Patient tolerated treatment well;Patient limited by fatigue   Medical Staff Made Aware OT Shivam   Nurse Made Aware BOOKER Hernandez   Assessment   Prognosis Good   Problem List Decreased strength;Decreased range of motion;Decreased endurance; Impaired balance;Orthopedic restrictions;Pain;Decreased mobility   Assessment Pt is a 63 y/o female who presented with symptoms of bruising of L ankle 2* to mechanical fall at home on 12/28/22  Patient admitted with closed trimalleolar fracture of L ankle on 12/29/22  Nonsurgical tx per Ortho  Pt currently in L lower leg cast and is NWB on LLE  Pt's PMH that may impact PT session includes HTN and seizure disorder  Pt referred to PT for mobility assessment and discharge planning  On eval, patient demonstrated minAx1 with transfers and ambulation  With ambulation, pt hopped on RLE w/ RW and minAx1 25' and required seated rest break afterward  Pt slightly unsteady when attempting to sit in recliner following amb  Pt denied SOB or dizziness throughout session  Pt required VC on hand placement with transfers, along with proper gait sequencing with RW  The patient's AM-PAC Basic Mobility Inpatient Short Form Raw Score is 18  A Raw score of greater than 16 suggests the patient may benefit from discharge to home  Please also refer to the recommendation of the Physical Therapist for safe discharge planning  From PT standpoint, pt would benefit from STR to maximize function prior to returning home due to decreased caregiver support  If home, pt would benefit from RW, W/C, and tub bench  Will continue PT per POC  At end of session, patient stable in bedside chair with all needs within reach  Patient educated to call nsg staff to assist with toileting and amb prn  Nsg notified     Barriers to Discharge Decreased caregiver support; Inaccessible home environment   Goals   Patient Goals To go home   STG Expiration Date 05/06/22   Short Term Goal #1 oals to be met in 7 days: patient able to: 1  Increased MMT of 1/2 grade grossly to improve overall functional mobility; 2  Improved balance by 1/2 grade grossly to improve safety and independence; 3  Improved bed mobility to mod I to progress towards PLOF; 4  Improved transfer ability to S to improve overall function and independence; 5  Increase ambulation with RW to approximately 150 ft with S assistance to increase endurance and overall strength; 6  Improve stair negotiation to minAx1 assistance to improve function and progress towards PLOF; 7  W/c mobility & management on level surface approx  150' w/ S; 8  Patient/caregiver education    PT Treatment Day 0   Plan   Treatment/Interventions Functional transfer training;LE strengthening/ROM; Elevations; Therapeutic exercise; Endurance training;Patient/family training;Bed mobility;Gait training;Spoke to nursing;OT   PT Frequency 3-5x/wk   Recommendation   PT Discharge Recommendation Post acute rehabilitation services   Equipment Recommended Wheelchair;Walker   Wheelchair Package Recommended Standard   Change or Add item to Wheelchair Package? Yes, Change Item   What would you like to CHANGE?  Wider Seat Width;Different Leg Rest Type (Elevating Leg Rests)   W/C Seat Width 18 inch   Walker Package Recommended Wheeled walker   Additional Comments STR, but if home will need HHPT, 24hr care, RW, W/C, tub bench   AM-PAC Basic Mobility Inpatient   Turning in Bed Without Bedrails 4   Lying on Back to Sitting on Edge of Flat Bed 3   Moving Bed to Chair 3   Standing Up From Chair 3   Walk in Room 3   Climb 3-5 Stairs 2   Basic Mobility Inpatient Raw Score 18   Basic Mobility Standardized Score 41 05   Highest Level Of Mobility   JH-HLM Goal 6: Walk 10 steps or more   JH-HLM Highest Level of Mobility 7: Walk 25 feet or more   JH-HLM Goal Achieved Yes   End of Consult   Patient Position at End of Consult Bedside chair; All needs within reach   End of Consult Comments Pt stable at end of session and seated in recliner     Hx/personal factors: inaccessible home, dec caregiver support, home alone, use of AD, pain, WB restrictions, h/o of falls, fall risk and assist w/ ADL's  Examination: dec mobility, dec balance, dec endurance, dec amb, risk for falls, pain, WB restrictions  Clinical: unpredictable (ongoing medical status, risk for falls and pain mgt)  Complexity: high     Aniya Katie, SPT

## 2022-04-29 NOTE — ASSESSMENT & PLAN NOTE
· Elevated /86, 175/94  · Not maintained on antihypertensive medications  Likely pain induced    · Close BP monitoring

## 2022-04-29 NOTE — CONSULTS
Orthopedics   [facility] 62 [facility] MR[facility]: [id number]  Unit/Bed#: E2 -01      Chief Complaint:   left ankle pain    HPI:  62 y  o female status post Fall at home down her stairs with a inversion movement of her L lower leg complaining of left ankle pain and inability to bear weight  Prior to this injury she was not having any issues with her left leg and was able to walk normally without use of assistive device  She is denying any distal numbness or tingling at this time  Her pain is localized to her ankle joint  Review Of Systems:   · Skin: Normal  · Neuro: See HPI  · Musculoskeletal: See HPI  · 14 point review of systems negative except as stated above     Past Medical History:   Past Medical History:   Diagnosis Date    Hypertension     Seizure ([address] )     Thyroid disease        Past Surgical History:   Past Surgical History:   Procedure Laterality Date    BRAIN SURGERY      HYSTERECTOMY      OVARIAN CYST SURGERY      WISDOM TOOTH EXTRACTION         Family History:  Family history reviewed and non-contributory  Family History   Problem Relation Age of Onset    Seizures Mother     Stroke Mother     Thyroid disease Mother     Heart disease Father        Social History:  Social History     Socioeconomic History    Marital status: Single     Spouse name: None    Number of children: None    Years of education: None    Highest education level: None   Occupational History    None   Tobacco Use    Smoking status: Former Smoker    Smokeless tobacco: Never Used   Vaping Use    Vaping Use: Never used   Substance and Sexual Activity    Alcohol use: No    Drug use: No    Sexual activity: None   Other Topics Concern    None   Social History Narrative    Lives in [place]        Social Determinants of Health     Financial Resource Strain: Not on file   Food Insecurity: Not on file   Transportation Needs: Not on file   Physical Activity: Not on file   Stress: Not on file   Social Connections: Not on file   Intimate Partner Violence: Not on file   Housing Stability: Not on file       Allergies:   No Known Allergies        Labs:  0   Lab Value Date/Time    HCT 38 1 04/29/2022 0428    HCT 41 8 04/29/2022 0031    HGB 12 4 04/29/2022 0428    HGB 13 8 04/29/2022 0031    WBC 6 70 04/29/2022 0428    WBC 5 02 04/29/2022 0031       Meds:    Current Facility-Administered Medications:     acetaminophen (TYLENOL) tablet 975 mg, 975 mg, Oral, Q6H PRN, Hilarie Babinski, CRNP, 975 mg at 04/29/22 0412    aluminum-magnesium hydroxide-simethicone (MYLANTA) oral suspension 30 mL, 30 mL, Oral, Q6H PRN, Hilarie Babinski, CRNP    Enoxaparin Sodium SOSY 40 mg, 40 mg, Subcutaneous, Daily, Hilarie Babinski, CRNP    HYDROmorphone HCl (DILAUDID) injection 0 2 mg, 0 2 mg, Intravenous, Q6H PRN, Hilarie Babinski, CRNP    lacosamide (VIMPAT) tablet 100 mg, 100 mg, Oral, Daily Before Breakfast, Hilarie Babinski, CRNP, 100 mg at 04/29/22 0709    lacosamide (VIMPAT) tablet 200 mg, 200 mg, Oral, After Dinner, Hilarie Babinski, CRNP    lamoTRIgine (LaMICtal) tablet 200 mg, 200 mg, Oral, BID, Hilarie Babinski, CRNP, 200 mg at 04/29/22 0901    ondansetron (ZOFRAN) injection 4 mg, 4 mg, Intravenous, Q6H PRN, Hilarie Babinski, CRNP    oxyCODONE (ROXICODONE) IR tablet 2 5 mg, 2 5 mg, Oral, Q4H PRN, Hilarie Babinski, CRNP    oxyCODONE (ROXICODONE) IR tablet 5 mg, 5 mg, Oral, Q4H PRN, Hilarie Babinski, CRNP    pantoprazole (PROTONIX) EC tablet 40 mg, 40 mg, Oral, Early Morning, Hilarie Babinski, CRNP, 40 mg at 04/29/22 0709    simethicone (MYLICON) chewable tablet 80 mg, 80 mg, Oral, 4x Daily PRN, Hilarie Babinski, CRNP    zonisamide (ZONEGRAN) capsule 100 mg, 100 mg, Oral, Daily Before Breakfast, Hilarie Babinski, CRNP, 100 mg at 04/29/22 0709    zonisamide (ZONEGRAN) capsule 200 mg, 200 mg, Oral, After [place] [facility] [place]    Blood Culture:   No results found for: BLOODCX    Wound Culture:   No results found for: WOUNDCULT    Ins and Outs:  No intake/output data recorded  Physical Exam:   /69 (BP Location: Right arm)   Pulse 73   Temp (!) 97 4 °F (36 3 °C) (Tympanic)   Resp 20   Ht 5' 5" (1 651 m)   Wt 77 5 kg (170 lb 13 7 oz)   SpO2 98%   BMI 28 43 kg/m²   Gen: Alert and oriented to person, place, time  HEENT: EOMI, eyes clear, moist mucus membranes, hearing intact  Respiratory: Bilateral chest rise  No audible wheezing found  Cardiovascular: Regular Rate and Rhythm  Abdomen: soft nontender/nondistended  Musculoskeletal: left lower extremity  · Skin intact  · Tender to palpation over medial and lateral malleoli  · Painful ankle range of motion  · Sensation intact DP/SP/Tib/Genaro/Saph  · Positive knee flexion/extension, EHL/FHL  · Palpable DP and PT pulses      Radiology:   I personally reviewed the films  X-rays left ankle from 04/28/2022 shows minimally displaced minimally impacted fracture of the distal left fibula  Small avulsion fracture noted at the distal tip of the medial malleolus  Nondisplaced fracture of the posterior malleolus  Procedure:  Splint reapplication  Due to malpositioning of the splint placed in the emergency room  A posterior and stirrup was removed at this time  Ortho glass was then placed in a posterior in sterile fashion with reapplication of Webril and Ace wraps  Assessment:  62 y  o female status post fall at home with left ankle fracture    Plan:     · NWB left lower extremity in AO splint  · Ok for DC from ED with instructions to f/u with Dr Miquel Arroyo in 2 weeks for repeat xrays and placement of Cam boot verses Cast   · Pain meds per Medicine    · Depending on progress with PT may need wheelchair rental   · Instructed to return to contact the office if pt experiences new numbness or tingling, pain that is uncontrollable with oral pain meds, or if toes become cold and pale    Juliann Hughes PA-C

## 2022-04-29 NOTE — NURSING NOTE
Patient's family brought in Tab  Briviact 75mg (non-formulary medication) from home  Patient wants to keep the med bottle with herself  Not comfortable or convinced to hand over the med to in patient pharmacy  Patient is fully alert and oriented  Explained to her the hospital policy  But she insist on holding the med herself

## 2022-04-29 NOTE — PLAN OF CARE
Problem: PHYSICAL THERAPY ADULT  Goal: Performs mobility at highest level of function for planned discharge setting  See evaluation for individualized goals  Description: Treatment/Interventions: Functional transfer training,LE strengthening/ROM,Elevations,Therapeutic exercise,Endurance training,Patient/family training,Bed mobility,Gait training,Spoke to nursing,OT  Equipment Recommended: Wheelchair,Walker       See flowsheet documentation for full assessment, interventions and recommendations  Note: Prognosis: Good  Problem List: Decreased strength,Decreased range of motion,Decreased endurance,Impaired balance,Orthopedic restrictions,Pain,Decreased mobility  Assessment: Pt is a 61 y/o female who presented with symptoms of bruising of L ankle 2* to mechanical fall at home on 12/28/22  Patient admitted with closed trimalleolar fracture of L ankle on 12/29/22  Nonsurgical tx per Ortho  Pt currently in L lower leg cast and is NWB on LLE  Pt's PMH that may impact PT session includes HTN and seizure disorder  Pt referred to PT for mobility assessment and discharge planning  On eval, patient demonstrated minAx1 with transfers and ambulation  With ambulation, pt hopped on RLE w/ RW and minAx1 25' and required seated rest break afterward  Pt slightly unsteady when attempting to sit in recliner following amb  Pt denied SOB or dizziness throughout session  Pt required VC on hand placement with transfers, along with proper gait sequencing with RW  The patient's AM-PAC Basic Mobility Inpatient Short Form Raw Score is 18  A Raw score of greater than 16 suggests the patient may benefit from discharge to home  Please also refer to the recommendation of the Physical Therapist for safe discharge planning  From PT standpoint, pt would benefit from STR to maximize function prior to returning home due to decreased caregiver support  If home, pt would benefit from RW, W/C, and tub bench  Will continue PT per POC   At end of session, patient stable in bedside chair with all needs within reach  Patient educated to call nsg staff to assist with toileting and amb prn  Nsg notified  Barriers to Discharge: Decreased caregiver support,Inaccessible home environment        PT Discharge Recommendation: Post acute rehabilitation services          See flowsheet documentation for full assessment

## 2022-04-29 NOTE — ASSESSMENT & PLAN NOTE
Followed outpatient by Neurology, outpatient AEDs:   · Lacosamide 100 mg in the morning and 200 mg at night  · Briviact 75 mg twice a day    Nonformulary, patient will request family bring in home medication  · Zonisamide 100 mg in the morning and 200 mg at night  · Lamotrigine 200 mg twice a day

## 2022-04-29 NOTE — PROGRESS NOTES
Pastoral Care Progress Note    2022  Patient:  Flako Fernando : 1960  Admission Date & Time: 2022  MRN: [id number] [facility]: [id number]                     Chaplaincy Interventions Utilized:   Empowerment: Encouraged self-care    Exploration: Explored emotional needs & resources    Collaboration: Encouraged adherence to treatment plan    Relationship Building: Cultivated a relationship of care and support and Listened empathically    Chaplaincy Outcomes Achieved:  Expressed gratitude

## 2022-04-29 NOTE — PLAN OF CARE
Problem: DISCHARGE PLANNING  Goal: Discharge to home or other facility with appropriate resources  Description: INTERVENTIONS:  - Identify barriers to discharge w/patient and caregiver  - Arrange for needed discharge resources and transportation as appropriate  - Identify discharge learning needs (meds, wound care, etc )  - Arrange for interpretive services to assist at discharge as needed  - Refer to Case Management Department for coordinating discharge planning if the patient needs post-hospital services based on physician/advanced practitioner order or complex needs related to functional status, cognitive ability, or social support system  Outcome: Progressing     Problem: PAIN - ADULT  Goal: Verbalizes/displays adequate comfort level or baseline comfort level  Description: Interventions:  - Encourage patient to monitor pain and request assistance  - Assess pain using appropriate pain scale  - Administer analgesics based on type and severity of pain and evaluate response  - Implement non-pharmacological measures as appropriate and evaluate response  - Consider cultural and social influences on pain and pain management  - Notify physician/advanced practitioner if interventions unsuccessful or patient reports new pain  Outcome: Progressing     Problem: Potential for Falls  Goal: Patient will remain free of falls  Description: INTERVENTIONS:  - Educate patient/family on patient safety including physical limitations  - Instruct patient to call for assistance with activity   - Consult OT/PT to assist with strengthening/mobility   - Keep Call bell within reach  - Keep bed low and locked with side rails adjusted as appropriate  - Keep care items and personal belongings within reach  - Initiate and maintain comfort rounds  - Make Fall Risk Sign visible to staff  - Offer Toileting every 4 Hours, in advance of need  - Initiate/Maintain bed alarm  - Obtain necessary fall risk management equipment: walker  - Apply yellow socks and bracelet for high fall risk patients  - Consider moving patient to room near nurses station  Outcome: Progressing

## 2022-04-29 NOTE — ASSESSMENT & PLAN NOTE
· Presents status post fall down the stairs at home  · XR ankle: Fractures of the distal left fibula and tibia  Tiny avulsion fracture arising from the distal tip of the medial malleolus  Additionally, there appears to be fracture of the posterior malleolus on the lateral view  Soft tissue swelling surrounding the ankle; there appears to be a left ankle joint effusion  · NWB LLE  · P r n   Analgesics  · Neurovascular checks  · PT OT consult  · Orthopedic surgery consult

## 2022-04-29 NOTE — PLAN OF CARE
Problem: OCCUPATIONAL THERAPY ADULT  Goal: Performs self-care activities at highest level of function for planned discharge setting  See evaluation for individualized goals  Description: Treatment Interventions: ADL retraining,Functional transfer training,UE strengthening/ROM,Endurance training,Patient/family training,Equipment evaluation/education,Compensatory technique education  Equipment Recommended: Bedside commode (RW,w/c--20" with elevating leg rests)       See flowsheet documentation for full assessment, interventions and recommendations  Note: Limitation: Decreased ADL status,Decreased Safe judgement during ADL,Decreased endurance,Decreased high-level ADLs  Prognosis: Good  Assessment: Pt is a 56y/o female admitted to the hospital after falling down stairs in her home  Pt noted with L distal fibula fx, small avulsion fx--medial malleolus, posterior malleolus fx  Ortho recommend non-surgical approach, L LE NWB status with cast/splint  Pt with PMH HTN, seizures, and brain sx  PTA pt states independence with her ADLs, transfers, ambulation--w/o device; +, +falls=1  During initial eval, pt demonstrated deficits with her functional balance, functional mobility, ADL status, transfer safety, b/l UE strength, and activity tolerance(currently fair=15-20mins)  Pt would benefit from continued OT tx for the above deficits  3-5xwk/1-2wks        OT Discharge Recommendation: Post acute rehabilitation services

## 2022-04-29 NOTE — H&P
[facility]  [facility] 1960[facility] 62 [facility]  [facility] MRN: [id number]  Unit/Bed#: E2 -01 Encounter: [id number]  Primary Care Provider: Gm Estrella DO   Date and time admitted to hospital: 4/28/2022  8:51 PM    * Trimalleolar fracture of ankle, closed, left, initial encounter  Assessment & Plan  · Presents status post fall down the stairs at home  · XR ankle: Fractures of the distal left fibula and tibia  Tiny avulsion fracture arising from the distal tip of the medial malleolus  Additionally, there appears to be fracture of the posterior malleolus on the lateral view  Soft tissue swelling surrounding the ankle; there appears to be a left ankle joint effusion  · NWB LLE  · P r n  Analgesics  · Neurovascular checks  · PT OT consult  · Orthopedic surgery consult    Essential hypertension  Assessment & Plan  · Elevated /86, 175/94  · Not maintained on antihypertensive medications  Likely pain induced  · Close BP monitoring    Localization-related focal epilepsy with complex partial seizures [facility])  Assessment & Plan  Followed outpatient by Neurology, outpatient AEDs:   · Lacosamide 100 mg in the morning and 200 mg at night  · Briviact 75 mg twice a day  Nonformulary, patient will request family bring in home medication  · Zonisamide 100 mg in the morning and 200 mg at night  · Lamotrigine 200 mg twice a day     Cognitive changes  Assessment & Plan  · Supportive care    GERD (gastroesophageal reflux disease)  Assessment & Plan  · Continue PPI    VTE Prophylaxis: Enoxaparin (Lovenox)  / sequential compression device   Code Status: FC  POLST: POLST is not applicable to this patient  Discussion with family:     Anticipated Length of Stay:  Patient will be admitted on an Observation basis with an anticipated length of stay of  < 2 midnights  Justification for Hospital Stay: tib/fib fx    Total Time for Visit, including Counseling / Coordination of Care: 60 minutes  Greater than 50% of this total time spent on direct patient counseling and coordination of care  Chief Complaint:   Slipped on stairs    History of Present Illness: Leona Arriaga is a 62 y o  female who presents with c/o fall  States she was walking down the stairs and slipped; her left leg went underneath her right leg and she slid down 7 steps  She felt immediate pain in left foot  She called her neighbor who assessed her foot and confirmed it appeared broken so she called 911  Review of Systems:    Review of Systems   Constitutional: Negative  HENT: Negative  Respiratory: Negative  Cardiovascular: Negative  Gastrointestinal: Negative  Genitourinary: Negative  Musculoskeletal: Positive for arthralgias and joint swelling  Skin: Positive for color change  Neurological: Negative  Psychiatric/Behavioral: Negative  Past Medical and Surgical History:     Past Medical History:   Diagnosis Date    Hypertension     Seizure ([facility] [address] )     Thyroid disease        Past Surgical History:   Procedure Laterality Date    BRAIN SURGERY      HYSTERECTOMY      OVARIAN CYST SURGERY      WISDOM TOOTH EXTRACTION         Meds/Allergies:    Prior to Admission medications    Medication Sig Start Date End Date Taking? Authorizing Provider   Brivaracetam (Briviact) 75 MG TABS TAKE 1 TABLET BY MOUTH TWICE DAILY 1/18/22  Yes Sathya Marie MD   Calcium Carbonate-Vitamin D (CALCIUM 600+D) 600-200 MG-UNIT TABS Take 1 tablet by mouth 2 (two) times a day   Yes Historical Provider, MD   lacosamide (Vimpat) 100 mg tablet Take 1 tablet (100 mg total) by mouth every morning AND 2 tablets (200 mg total) every evening   1/19/22  Yes Sathya Marie MD   lamoTRIgine (LaMICtal) 200 MG tablet TAKE 1 TABLET TWICE A DAY 9/7/21  Yes Sathya Marie MD   pantoprazole (PROTONIX) 40 mg tablet Take 40 mg by mouth daily   Yes Historical Provider, MD   zonisamide (ZONEGRAN) 100 mg capsule Taking 100mg in am and 200mg at night 9/7/21  Yes Sarah Garcia MD     I have reviewed home medications with patient personally  Allergies: No Known Allergies    Social History:     Marital Status: Single   Occupation: accounting  Patient Pre-hospital Living Situation:  Resides at home alone  Patient Pre-hospital Level of Mobility:  Ambulatory  Patient Pre-hospital Diet Restrictions:   Substance Use History:   Social History     Substance and Sexual Activity   Alcohol Use No     Social History     Tobacco Use   Smoking Status Former Smoker   Smokeless Tobacco Never Used     Social History     Substance and Sexual Activity   Drug Use No       Family History:    Family History   Problem Relation Age of Onset    Seizures Mother     Stroke Mother     Thyroid disease Mother     Heart disease Father        Physical Exam:     Vitals:   Blood Pressure: 161/72 (04/29/22 0144)  Pulse: 81 (04/29/22 0144)  Temperature: 97 7 °F (36 5 °C) (04/29/22 0144)  Temp Source: Tympanic (04/29/22 0144)  Respirations: 20 (04/29/22 0144)  Height: 5' 5" (165 1 cm) (04/29/22 0144)  Weight - Scale: 77 5 kg (170 lb 13 7 oz) (04/29/22 0144)  SpO2: 100 % (04/29/22 0144)    Physical Exam  Constitutional:       General: She is not in acute distress  Appearance: Normal appearance  She is normal weight  She is not ill-appearing, toxic-appearing or diaphoretic  HENT:      Head: Normocephalic and atraumatic  Nose: No congestion or rhinorrhea  Mouth/Throat:      Mouth: Mucous membranes are dry  Eyes:      Conjunctiva/sclera: Conjunctivae normal    Cardiovascular:      Rate and Rhythm: Normal rate and regular rhythm  Heart sounds: Normal heart sounds  Pulmonary:      Effort: Pulmonary effort is normal       Breath sounds: Normal breath sounds  Abdominal:      General: Bowel sounds are normal       Palpations: Abdomen is soft  Musculoskeletal:      Right lower leg: No edema  Left lower leg: No edema        Comments: LLE cast   Skin:     General: Skin is warm and dry  Capillary Refill: Capillary refill takes less than 2 seconds  Palpable dorsalis pedal pulse L foot     Coloration: Skin is not pale  Neurological:      Mental Status: She is alert and oriented to person, place, and time  Psychiatric:         Mood and Affect: Mood normal          Behavior: Behavior normal          Thought Content: Thought content normal          Judgment: Judgment normal        Additional Data:     Lab Results: I have personally reviewed pertinent reports  Results from last 7 days   Lab Units 04/29/22  0031   WBC Thousand/uL 5 02   HEMOGLOBIN g/dL 13 8   HEMATOCRIT % 41 8   PLATELETS Thousands/uL 207   NEUTROS PCT % 59   LYMPHS PCT % 32   MONOS PCT % 8   EOS PCT % 0     Results from last 7 days   Lab Units 04/29/22  0031   SODIUM mmol/L 141   POTASSIUM mmol/L 3 5   CHLORIDE mmol/L 107   CO2 mmol/L 26   BUN mg/dL 18   CREATININE mg/dL 1 01   ANION GAP mmol/L 8   CALCIUM mg/dL 9 4   ALBUMIN g/dL 4 1   TOTAL BILIRUBIN mg/dL 0 41   ALK PHOS U/L 81   ALT U/L 21   AST U/L 15   GLUCOSE RANDOM mg/dL 101                       Imaging: I have personally reviewed pertinent reports  XR ankle 3+ views LEFT   Final Result by Natalie Jane MD (04/28 2315)      Fractures of the distal left fibula and tibia, as described above  Please see discussion  The images are available for clinical review  The study was marked in [facility] for immediate notification  Workstation performed: [id number]             EKG, Pathology, and Other Studies Reviewed on Admission:   xr  Allscripts / Epic Records Reviewed: Yes     ** Please Note: This note has been constructed using a voice recognition system   **

## 2022-04-29 NOTE — DISCHARGE INSTRUCTIONS
POSTOPERATIVE INSTRUCTIONS    SWELLING CONTROL:  · Cold Therapy:  Apply ice (20 min on, 20 min off) as often as you feel is necessary  · Elevation:  Elevate the entire leg above heart level as much as possible  · Compression:  Apply ACE wraps or a compression stocking as needed  RANGE OF MOTION:  · You are NOT ALLOWED RANGE OF MOTION until you are given permission from your surgeon  IMMOBILIZATION:  · DO NOT REMOVE YOUR SPLINT  Keep it clean and dry  ACTIVITY:  · DO NOT BEAR WEIGHT on the operative leg  Always use crutches  · Using Crutches on Stairs:  Going up, lead with your "good" (nonoperative) leg  Going down, lead with your "bad" (operative) leg  Use a hand rail when available  · Quad Sets:  Sit or lie with your knee straight  Tighten your quadriceps (front thigh) muscle  Hold for 3 seconds, then relax  Repeat 20 times per hour while awake  PHYSICAL THERAPY:  · You will be given a physical therapy prescription when you are seen in the office for your postoperative appointment  FOLLOW-UP APPOINTMENT:  · 2 weeks after surgery with:    Dr Samia Brumfield, [address] [facility]  [address] [place] [address], [address] [place] [address], [facility], [address]   [phone] ([facility] [address])  [phone] (After Hours)

## 2022-04-29 NOTE — ED PROVIDER NOTES
History  Chief Complaint   Patient presents with    Fall     pt via ems from home after mechanical fall down 2 steps where L ankle ended up "underneath of her"  no head hit, no LOC, no thinners  pt unsure if crack heard  +3 pedal pulses  per ems, no deformity but lateral brusing present  Splint by EMS,      The patient is a 62 YOF with history of HTN and epilepsy who presents to the ED for evaluation after she was walking down the stairs with her trash when her left foot got caught in her shoe, causing her to fall on top of the ankle and down 2-3 steps  She reports that she did not hit her head, lose consciousness, or have any symptoms prior to falling  Her only complaint at this time is left ankle pain  She reports feeling slight paresthesia to the top of the left foot  She otherwise denies chest pain, dyspnea, headache, neck pain, back pain, numbness, anticoagulation, and/or any other extremity injury  Prior to Admission Medications   Prescriptions Last Dose Informant Patient Reported? Taking? Brivaracetam (Briviact) 75 MG TABS 4/28/2022 at Unknown time  No Yes   Sig: TAKE 1 TABLET BY MOUTH TWICE DAILY   Calcium Carbonate-Vitamin D (CALCIUM 600+D) 600-200 MG-UNIT TABS 4/28/2022 at Unknown time Self Yes Yes   Sig: Take 1 tablet by mouth 2 (two) times a day   lacosamide (Vimpat) 100 mg tablet 4/28/2022 at Unknown time  No Yes   Sig: Take 1 tablet (100 mg total) by mouth every morning AND 2 tablets (200 mg total) every evening     lamoTRIgine (LaMICtal) 200 MG tablet 4/28/2022 at Unknown time  No Yes   Sig: TAKE 1 TABLET TWICE A DAY   pantoprazole (PROTONIX) 40 mg tablet 4/28/2022 at Unknown time Self Yes Yes   Sig: Take 40 mg by mouth daily   zonisamide (ZONEGRAN) 100 mg capsule 4/28/2022 at Unknown time  No Yes   Sig: Taking 100mg in am and 200mg at night      Facility-Administered Medications: None       Past Medical History:   Diagnosis Date    Hypertension     Seizure ([facility] [address] )     Thyroid disease Past Surgical History:   Procedure Laterality Date    BRAIN SURGERY      HYSTERECTOMY      OVARIAN CYST SURGERY      WISDOM TOOTH EXTRACTION         Family History   Problem Relation Age of Onset    Seizures Mother     Stroke Mother     Thyroid disease Mother     Heart disease Father      I have reviewed and agree with the history as documented  E-Cigarette/Vaping    E-Cigarette Use Never User      E-Cigarette/Vaping Substances     Social History     Tobacco Use    Smoking status: Former Smoker    Smokeless tobacco: Never Used   Vaping Use    Vaping Use: Never used   Substance Use Topics    Alcohol use: No    Drug use: No       Review of Systems   Constitutional: Negative for chills and fever  HENT: Negative for congestion and rhinorrhea  Respiratory: Negative for cough and shortness of breath  Cardiovascular: Negative for chest pain and leg swelling  Gastrointestinal: Negative for abdominal pain, constipation, diarrhea, nausea and vomiting  Genitourinary: Negative for dysuria and flank pain  Musculoskeletal: Positive for arthralgias (L ankle)  Negative for myalgias  Skin: Negative for rash and wound  Neurological: Negative for dizziness, weakness, numbness and headaches  Psychiatric/Behavioral: Negative for behavioral problems  Physical Exam  Physical Exam  Vitals and nursing note reviewed  Constitutional:       General: She is not in acute distress  Appearance: She is well-developed  HENT:      Head: Normocephalic and atraumatic  Eyes:      Conjunctiva/sclera: Conjunctivae normal    Cardiovascular:      Rate and Rhythm: Normal rate and regular rhythm  Heart sounds: No murmur heard  Pulmonary:      Effort: Pulmonary effort is normal  No respiratory distress  Breath sounds: Normal breath sounds  Abdominal:      Palpations: Abdomen is soft  Tenderness: There is no abdominal tenderness     Musculoskeletal:      Right upper arm: Normal  Left upper arm: Normal       Right forearm: Normal       Left forearm: Normal       Right wrist: Normal       Left wrist: Normal       Cervical back: Normal and neck supple  No bony tenderness  No pain with movement  Thoracic back: Normal  No bony tenderness  Lumbar back: Normal  No bony tenderness  Right hip: Normal       Left hip: Normal  Normal range of motion  Right upper leg: Normal       Left upper leg: Normal  No bony tenderness  Right knee: Normal       Left knee: Normal       Right lower leg: Normal       Left lower leg: Normal       Right ankle: No swelling or ecchymosis  No tenderness  Normal range of motion  Normal pulse  Left ankle: Swelling and ecchymosis present  Tenderness present over the lateral malleolus and medial malleolus  No base of 5th metatarsal or proximal fibula tenderness  Decreased range of motion  Normal pulse  Right foot: Bunion present  No swelling or bony tenderness  Normal pulse  Left foot: Bunion present  No bony tenderness  Normal pulse  Skin:     General: Skin is warm and dry  Neurological:      General: No focal deficit present  Mental Status: She is alert  Sensory: No sensory deficit           Vital Signs  ED Triage Vitals   Temperature Pulse Respirations Blood Pressure SpO2   04/28/22 2055 04/28/22 2055 04/28/22 2055 04/28/22 2055 04/28/22 2055   97 7 °F (36 5 °C) 75 18 (!) 182/86 100 %      Temp Source Heart Rate Source Patient Position - Orthostatic VS BP Location FiO2 (%)   04/28/22 2055 04/28/22 2055 04/28/22 2055 04/28/22 2055 --   Oral Monitor Lying Right arm       Pain Score       04/28/22 2308       5           Vitals:    04/28/22 2055 04/28/22 2308   BP: (!) 182/86 (!) 175/94   Pulse: 75 86   Patient Position - Orthostatic VS: Lying Lying         Visual Acuity      ED Medications  Medications - No data to display    Diagnostic Studies  Results Reviewed     Procedure Component Value Units Date/Time    Comprehensive metabolic panel [[id number]] Collected: 04/29/22 0031    Lab Status: Final result Specimen: Blood from Arm, Left Updated: 04/29/22 0100     Sodium 141 mmol/L      Potassium 3 5 mmol/L      Chloride 107 mmol/L      CO2 26 mmol/L      ANION GAP 8 mmol/L      BUN 18 mg/dL      Creatinine 1 01 mg/dL      Glucose 101 mg/dL      Calcium 9 4 mg/dL      AST 15 U/L      ALT 21 U/L      Alkaline Phosphatase 81 U/L      Total Protein 7 4 g/dL      Albumin 4 1 g/dL      Total Bilirubin 0 41 mg/dL      eGFR 60 ml/min/1 73sq m     Narrative:      National Kidney Disease Foundation guidelines for Chronic Kidney Disease (CKD):     Stage 1 with normal or high GFR (GFR > 90 mL/min/1 73 square meters)    Stage 2 Mild CKD (GFR = 60-89 mL/min/1 73 square meters)    Stage 3A Moderate CKD (GFR = 45-59 mL/min/1 73 square meters)    Stage 3B Moderate CKD (GFR = 30-44 mL/min/1 73 square meters)    Stage 4 Severe CKD (GFR = 15-29 mL/min/1 73 square meters)    Stage 5 End Stage CKD (GFR <15 mL/min/1 73 square meters)  Note: GFR calculation is accurate only with a steady state creatinine    CBC and differential [[id number]] Collected: 04/29/22 0031    Lab Status: Final result Specimen: Blood from Arm, Left Updated: 04/29/22 0037     WBC 5 02 Thousand/uL      RBC 4 25 Million/uL      Hemoglobin 13 8 g/dL      Hematocrit 41 8 %      MCV 98 fL      MCH 32 5 pg      MCHC 33 0 g/dL      RDW 12 0 %      MPV 9 9 fL      Platelets 163 Thousands/uL      nRBC 0 /100 WBCs      Neutrophils Relative 59 %      Immat GRANS % 0 %      Lymphocytes Relative 32 %      Monocytes Relative 8 %      Eosinophils Relative 0 %      Basophils Relative 1 %      Neutrophils Absolute 3 00 Thousands/µL      Immature Grans Absolute 0 02 Thousand/uL      Lymphocytes Absolute 1 58 Thousands/µL      Monocytes Absolute 0 38 Thousand/µL      Eosinophils Absolute 0 01 Thousand/µL      Basophils Absolute 0 03 Thousands/µL                  XR ankle 3+ views LEFT   Final Result by Natalie Jane MD (04/28 7951)      Fractures of the distal left fibula and tibia, as described above  Please see discussion  The images are available for clinical review  The study was marked in [facility] for immediate notification  Workstation performed: [id number]                    Procedures  Orthopedic injury treatment    Date/Time: 4/28/2022 11:30 PM  Performed by: Bridger Esqueda PA-C  Authorized by: Bridger Esqueda PA-C     Patient Location:  ED  Other Assisting Provider: No    Verbal consent obtained?: Yes    Risks and benefits: Risks, benefits and alternatives were discussed    Consent given by:  Patient  Patient states understanding of procedure being performed: Yes    Patient's understanding of procedure matches consent: Yes    Patient identity confirmed:  Verbally with patient  Injury location:  Ankle  Location details:  Left ankle  Injury type:  Fracture  Fracture type: trimalleolar    Neurovascular status: Neurovascularly intact    Distal perfusion: normal    Neurological function: normal    Local anesthesia used?: No    General anesthesia used?: No    Immobilization:  Splint  Splint type:  Short leg  Supplies used:  Cotton padding, Ortho-Glass and elastic bandage  Neurovascular status: Neurovascularly intact    Distal perfusion: normal    Neurological function: normal    Patient tolerance:  Patient tolerated the procedure well with no immediate complications             ED Course  The patient is a 62 YOF who presents to the ED for left ankle pain that began after she had a mechanical fall down 2-3 stairs jpta  Patient denies head strike, LOC, anticoagulation, any other injury or complaints  On exam, patient does have medial and lateral left ankle tenderness to palpation  Patient without any other tenderness to palpation, including to the spine, left leg, left knee, and left hip  Patient's left leg is neurovascularly intact  Will obtain XR    Patient reports pain is under control at this time  ED Course as of 04/29/22 0241   u Apr 28, 2022   2307 Distal fibular fracture and medial tibia fracture, concern for trimalleolar fx; will have radiology read   Fri Apr 29, 2022   0000 Attempted ambulation with crutches without success; will admit patient  CBC/CMP ordered  Ankle splinted by me as above  Will admit patient to SLIM  At the time of admission, the patient is in no acute distress  I discussed with the patient the diagnosis, treatment plan, admission; they were given the opportunity to ask questions and verbalized understanding  SBIRT 22yo+      Most Recent Value   SBIRT (22 yo +)    In order to provide better care to our patients, we are screening all of our patients for alcohol and drug use  Would it be okay to ask you these screening questions?  No Filed at: 04/28/2022 2111          [facility]  Number of Diagnoses or Management Options  Trimalleolar fracture of ankle, closed, left, initial encounter: new and requires workup     Amount and/or Complexity of Data Reviewed  Clinical lab tests: ordered and reviewed  Tests in the radiology section of CPT®: ordered and reviewed  Decide to obtain previous medical records or to obtain history from someone other than the patient: yes  Review and summarize past medical records: yes  Discuss the patient with other providers: yes  Independent visualization of images, tracings, or specimens: yes    Risk of Complications, Morbidity, and/or Mortality  Presenting problems: moderate  Management options: moderate    Patient Progress  Patient progress: improved      Disposition  Final diagnoses:   Trimalleolar fracture of ankle, closed, left, initial encounter     Time reflects when diagnosis was documented in both MDM as applicable and the Disposition within this note     Time User Action Codes Description Comment    4/28/2022 11:20 PM Jama Pretty Add [[id number]] Trimalleolar fracture of ankle, closed, left, initial encounter ED Disposition     ED Disposition Condition Date/Time Comment    Admit Stable Fri Apr 29, 2022 0111 Case was discussed with NANCY and the patient's admission status was agreed to be Admission Status: observation status to the service of Dr Thong Claderón   Follow-up Information     Follow up With Specialties Details Why Contact Info Additional [address] Orthopedic Surgery Call today  [address]  [address] [facility] [address]  [address] [facility], [address] [facility] [employer] [address], [address] [place], [place], [id number] [phone]          Patient's Medications   Discharge Prescriptions    No medications on file       No discharge procedures on file      PDMP Review       Value Time User    PDMP Reviewed  Yes 4/29/2022 12:24 AM Jordy Leone          ED Provider  Electronically Signed by           Onnie Soulier, PA-C  04/29/22 1993

## 2022-04-30 PROCEDURE — 99232 SBSQ HOSP IP/OBS MODERATE 35: CPT | Performed by: INTERNAL MEDICINE

## 2022-04-30 RX ADMIN — LACOSAMIDE 200 MG: 150 TABLET, FILM COATED ORAL at 17:33

## 2022-04-30 RX ADMIN — LAMOTRIGINE 200 MG: 100 TABLET ORAL at 08:26

## 2022-04-30 RX ADMIN — ZONISAMIDE 200 MG: 100 CAPSULE ORAL at 17:34

## 2022-04-30 RX ADMIN — LAMOTRIGINE 200 MG: 100 TABLET ORAL at 17:33

## 2022-04-30 RX ADMIN — Medication 1 TABLET: at 17:33

## 2022-04-30 RX ADMIN — ACETAMINOPHEN 975 MG: 325 TABLET ORAL at 05:39

## 2022-04-30 RX ADMIN — BRIVARACETAM 75 MG: 75 TABLET, FILM COATED ORAL at 17:34

## 2022-04-30 RX ADMIN — PANTOPRAZOLE SODIUM 40 MG: 40 TABLET, DELAYED RELEASE ORAL at 05:39

## 2022-04-30 RX ADMIN — ENOXAPARIN SODIUM 40 MG: 40 INJECTION SUBCUTANEOUS at 17:33

## 2022-04-30 RX ADMIN — LACOSAMIDE 100 MG: 50 TABLET, FILM COATED ORAL at 08:26

## 2022-04-30 RX ADMIN — ZONISAMIDE 100 MG: 100 CAPSULE ORAL at 08:27

## 2022-04-30 NOTE — CASE MANAGEMENT
Case Management Discharge Planning Note    Patient name Kristian Donis  Location East 2 /E2 -* MRN [id number]  : 1960 Date 2022       Current Admission Date: 2022  Current Admission Diagnosis:Trimalleolar fracture of ankle, closed, left, initial encounter   Patient Active Problem List    Diagnosis Date Noted    Trimalleolar fracture of ankle, closed, left, initial encounter 2022    Cognitive changes 2018    Adjustment disorder with depressed mood 2018    GERD (gastroesophageal reflux disease) 2018    Localization-related focal epilepsy with complex partial seizures ([facility] [address] ) 2017    History of craniotomy 2016    Essential hypertension 2016      LOS (days): 0  Geometric Mean LOS (GMLOS) (days):   Days to GMLOS:     OBJECTIVE:  Risk of Unplanned Readmission Score: 7         Current admission status: Inpatient   Preferred Pharmacy:   [facility], [address] [place] [address]  Phone: [phone] Fax: [facility] [phone]  [place] [address]  Phone: [phone] Fax: [phone]    Primary Care Provider: Jefry Caro DO    Primary Insurance: BLUE CROSS  Secondary Insurance:     DISCHARGE DETAILS:    Discharge planning discussed with[de-identified] Patient  Freedom of Choice: Yes     CM contacted family/caregiver?: No- see comments (alert and oriented)  Were Treatment Team discharge recommendations reviewed with patient/caregiver?: Yes  Did patient/caregiver verbalize understanding of patient care needs?: Yes  Were patient/caregiver advised of the risks associated with not following Treatment Team discharge recommendations?: Yes         [facility]         Is the patient interested in [address] at discharge?: No    DME Referral Provided  Referral made for DME?: No    Other Referral/Resources/Interventions Provided:  Interventions: Short Term Rehab  Referral Comments: agreeable to referrals being sent to [address] [place] [address] and [facility]         Treatment Team Recommendation: Short Term Rehab  Discharge Destination Plan[de-identified] Short Term Rehab       CM received a tiger text from Alejandra Bolivar at [address] that patient does not have medical necessity requiring frequent physician oversight, therefore [address] [facility] vs SNF is recommended  CM met with patient at bedside to discuss STR further  She is agreeable to STR and would like referrals being sent to [facility] as first option and [facility] as second  [facility] can be an option  CM sent referrals to these rehabs

## 2022-04-30 NOTE — ASSESSMENT & PLAN NOTE
Followed outpatient by Neurology, outpatient AEDs:   · Lacosamide 100 mg in the morning and 200 mg at night  · Briviact 75 mg twice a day   Medication delivered to pharmacy   · Zonisamide 100 mg in the morning and 200 mg at night  · Lamotrigine 200 mg twice a day

## 2022-04-30 NOTE — PLAN OF CARE
Problem: MOBILITY - ADULT  Goal: Maintain or return to baseline ADL function  Description: INTERVENTIONS:  -  Assess patient's ability to carry out ADLs; assess patient's baseline for ADL function and identify physical deficits which impact ability to perform ADLs (bathing, care of mouth/teeth, toileting, grooming, dressing, etc )  - Assess/evaluate cause of self-care deficits   - Assess range of motion  - Assess patient's mobility; develop plan if impaired  - Assess patient's need for assistive devices and provide as appropriate  - Encourage maximum independence but intervene and supervise when necessary  - Involve family in performance of ADLs  - Assess for home care needs following discharge   - Consider OT consult to assist with ADL evaluation and planning for discharge  - Provide patient education as appropriate  Outcome: Progressing  Goal: Maintains/Returns to pre admission functional level  Description: INTERVENTIONS:  - Perform BMAT or MOVE assessment daily    - Set and communicate daily mobility goal to care team and patient/family/caregiver  - Collaborate with rehabilitation services on mobility goals if consulted  - Perform Range of Motion  3 times a day  - Reposition patient every 2hours    - Dangle patient 3 times a day  - Stand patient 3 times a day  - Ambulate patient 3 times a day  - Out of bed to chair 3 times a day   - Out of bed for meals 3  times a day  - Out of bed for toileting  - Record patient progress and toleration of activity level   Outcome: Progressing     Problem: PAIN - ADULT  Goal: Verbalizes/displays adequate comfort level or baseline comfort level  Description: Interventions:  - Encourage patient to monitor pain and request assistance  - Assess pain using appropriate pain scale  - Administer analgesics based on type and severity of pain and evaluate response  - Implement non-pharmacological measures as appropriate and evaluate response  - Consider cultural and social influences on pain and pain management  - Notify physician/advanced practitioner if interventions unsuccessful or patient reports new pain  Outcome: Progressing     Problem: SAFETY ADULT  Goal: Maintain or return to baseline ADL function  Description: INTERVENTIONS:  -  Assess patient's ability to carry out ADLs; assess patient's baseline for ADL function and identify physical deficits which impact ability to perform ADLs (bathing, care of mouth/teeth, toileting, grooming, dressing, etc )  - Assess/evaluate cause of self-care deficits   - Assess range of motion  - Assess patient's mobility; develop plan if impaired  - Assess patient's need for assistive devices and provide as appropriate  - Encourage maximum independence but intervene and supervise when necessary  - Involve family in performance of ADLs  - Assess for home care needs following discharge   - Consider OT consult to assist with ADL evaluation and planning for discharge  - Provide patient education as appropriate  Outcome: Progressing  Goal: Maintains/Returns to pre admission functional level  Description: INTERVENTIONS:  - Perform BMAT or MOVE assessment daily    - Set and communicate daily mobility goal to care team and patient/family/caregiver  - Collaborate with rehabilitation services on mobility goals if consulted  - Perform Range of Motion 3times a day  - Reposition patient every hours    - Dangle patient times a day  - Stand patient  times a day  - Ambulate patient times a day  - Out of bed to chair times a day   - Out of bed for meals  times a day  - Out of bed for toileting  - Record patient progress and toleration of activity level   Outcome: Progressing

## 2022-04-30 NOTE — ASSESSMENT & PLAN NOTE
· Presents status post fall down the stairs at home  · XR ankle: Fractures of the distal left fibula and tibia  Tiny avulsion fracture arising from the distal tip of the medial malleolus  Additionally, there appears to be fracture of the posterior malleolus on the lateral view  Soft tissue swelling surrounding the ankle; there appears to be a left ankle joint effusion  · NWB LLE  · P r n  Analgesics  · Neurovascular checks  · PT OT recommended str  · Appreciate ortho recommendation: S/p splint   follow up with ortho in 2 weeks for repeat xray and placement of camboot vs cast

## 2022-04-30 NOTE — PROGRESS NOTES
[address] [place] [address]  Progress Note - Flako Fernando 1960, 62 y o  female MRN: [id number]  Unit/Bed#: E2 -01 Encounter: [id number]  Primary Care Provider: Javi Dc DO   Date and time admitted to hospital: 4/28/2022  8:51 PM    Cognitive changes  Assessment & Plan  · Supportive care    GERD (gastroesophageal reflux disease)  Assessment & Plan  · Continue PPI    Essential hypertension  Assessment & Plan  · Elevated /86, 175/94  · Not maintained on antihypertensive medications  Likely pain induced  · bp currently stable off of meds    Localization-related focal epilepsy with complex partial seizures [facility])  Assessment & Plan  Followed outpatient by Neurology, outpatient AEDs:   · Lacosamide 100 mg in the morning and 200 mg at night  · Briviact 75 mg twice a day  Medication delivered to pharmacy   · Zonisamide 100 mg in the morning and 200 mg at night  · Lamotrigine 200 mg twice a day     * Trimalleolar fracture of ankle, closed, left, initial encounter  Assessment & Plan  · Presents status post fall down the stairs at home  · XR ankle: Fractures of the distal left fibula and tibia  Tiny avulsion fracture arising from the distal tip of the medial malleolus  Additionally, there appears to be fracture of the posterior malleolus on the lateral view  Soft tissue swelling surrounding the ankle; there appears to be a left ankle joint effusion  · NWB LLE  · P r n  Analgesics  · Neurovascular checks  · PT OT recommended str  · Appreciate ortho recommendation: S/p splint  follow up with ortho in 2 weeks for repeat xray and placement of camboot vs cast        VTE Pharmacologic Prophylaxis: lovenox    Mechanical VTE Prophylaxis in Place: Yes    Education and Discussions with Family / Patient: Kathryn Neely said not to worry about calling her sister as she is vacationing in [address] [place] [address] for Care: 20 minutes    More than 50% of total time spent on counseling and coordination of care as described above  Current Length of Stay: 0 day(s)  Current Patient Status: Inpatient     Discharge Plan / Estimated Discharge Date: awaiting str  Code Status: Level 1 - Full Code      Subjective:   Pt seen and examined  Pt doing well  She has her briviact in her room  She states she had some difficulty sleeping last night but doesn't want anything for sleep as she is worried it will make her have a seizure  No f/c no cp no sob no n/v/d no abd pain  Objective:     Vitals:   Temp (24hrs), Av 1 °F (36 7 °C), Min:97 5 °F (36 4 °C), Max:98 6 °F (37 °C)    Temp:  [97 5 °F (36 4 °C)-98 6 °F (37 °C)] 98 6 °F (37 °C)  HR:  [74-96] 96  Resp:  [18] 18  BP: (116-129)/(53-69) 116/69  SpO2:  [96 %-100 %] 96 %  Body mass index is 28 43 kg/m²  Input and Output Summary (last 24 hours): Intake/Output Summary (Last 24 hours) at 2022 1111  Last data filed at 2022 2744  Gross per 24 hour   Intake --   Output 1200 ml   Net -1200 ml       Physical Exam:   Physical Exam  Constitutional:       Appearance: Normal appearance  HENT:      Head: Normocephalic and atraumatic  Eyes:      Extraocular Movements: Extraocular movements intact  Pupils: Pupils are equal, round, and reactive to light  Cardiovascular:      Rate and Rhythm: Normal rate and regular rhythm  Heart sounds: No murmur heard  No friction rub  No gallop  Pulmonary:      Effort: Pulmonary effort is normal  No respiratory distress  Breath sounds: Normal breath sounds  No wheezing or rales  Abdominal:      General: Bowel sounds are normal  There is no distension  Palpations: Abdomen is soft  Tenderness: There is no abdominal tenderness  There is no guarding  Neurological:      Mental Status: She is alert and oriented to person, place, and time          Additional Data:     Labs:  Results from last 7 days   Lab Units 22  0428   WBC Thousand/uL 6 70   HEMOGLOBIN g/dL 12 4   HEMATOCRIT % 38 1 PLATELETS Thousands/uL 203   NEUTROS PCT % 72   LYMPHS PCT % 19   MONOS PCT % 9   EOS PCT % 0     Results from last 7 days   Lab Units 04/29/22  0428 04/29/22  0031 04/29/22  0031   SODIUM mmol/L 141   < > 141   POTASSIUM mmol/L 3 7   < > 3 5   CHLORIDE mmol/L 108   < > 107   CO2 mmol/L 24   < > 26   BUN mg/dL 16   < > 18   CREATININE mg/dL 0 84   < > 1 01   ANION GAP mmol/L 9   < > 8   CALCIUM mg/dL 9 1   < > 9 4   ALBUMIN g/dL  --   --  4 1   TOTAL BILIRUBIN mg/dL  --   --  0 41   ALK PHOS U/L  --   --  81   ALT U/L  --   --  21   AST U/L  --   --  15   GLUCOSE RANDOM mg/dL 95   < > 101    < > = values in this interval not displayed                         Recent Cultures (last 7 days):           Lines/Drains:  Invasive Devices  Report    Peripheral Intravenous Line            Peripheral IV 04/29/22 Left Antecubital 1 day                Last 24 Hours Medication List:   Current Facility-Administered Medications   Medication Dose Route Frequency Provider Last Rate    acetaminophen  975 mg Oral Q6H PRN Maria Fernanda Quiet, CRNP      aluminum-magnesium hydroxide-simethicone  30 mL Oral Q6H PRN Maria Fernanda Quiet, CRNP      calcium carbonate-vitamin D  1 tablet Oral Daily With Dinner Aimee Collins, DO      enoxaparin  40 mg Subcutaneous Daily Maria Fernanda Quiet, CRNP      HYDROmorphone  0 2 mg Intravenous Q6H PRN Maria Fernanda Quiet, CRNP      lacosamide  100 mg Oral Daily Before Breakfast Maria Fernanda Quiet, CRNP      lacosamide  200 mg Oral After Dinner Maria Fernanda Quiet, CRNP      lamoTRIgine  200 mg Oral BID Maria Fernanda Quiet, CRNP      NON FORMULARY  75 mg Oral BID Shahrzad Bob, DO      ondansetron  4 mg Intravenous Q6H PRN Maria Fernanda Quiet, CRNP      oxyCODONE  2 5 mg Oral Q4H PRN Maria Fernanda Quiet, CRNP      oxyCODONE  5 mg Oral Q4H PRN Maria Fernanda Quiet, CRNP      pantoprazole  40 mg Oral Early Morning Maria Fernanda Quiet, CRNP      simethicone  80 mg Oral 4x Daily PRN Maria Fernanda Quiet, SHAQ      zonisamide  100 mg Oral Daily Before Breakfast Deneen Schwab, CRNP      zonisamide  200 mg Oral After RedSHAQ Valenzuela          Today, Patient Was Seen By: Mary Alice Juarez DO

## 2022-04-30 NOTE — ARC ADMISSION
Referral received for consideration of patient for inpatient acute rehab  Will review patient's case with [facility] physician and update CM as able  Reviewed patient's case with [facility] physician - patient has no medical necessity requiring frequent physician oversight  Recommended for [facility] vs SNF rehab at this time  CM has been updated

## 2022-04-30 NOTE — UTILIZATION REVIEW
Initial Clinical Review  WAS OBSERVATION 4/29/22 @ 0112 CONVERTED TO INPATIENT ADMISSION 4/30/22 DUE TO CONTINUED STAY REQUIRED TO CARE FOR PATIENT WITH NEED FOR SAFE D/C PLANNING AND PLACEMENT PENDING IN ACUTE SHORT TERM REHAB FACILITY    Admission: Date/Time/Statement:   Admission Orders (From admission, onward)     Ordered        04/30/22 1053  Inpatient Admission  Once            04/29/22 0112  Place in Observation  Once                      Orders Placed This Encounter   Procedures    Inpatient Admission     Standing Status:   Standing     Number of Occurrences:   1     Order Specific Question:   Level of Care     Answer:   Med Surg [16]     Order Specific Question:   Estimated length of stay     Answer:   More than 2 Midnights     Order Specific Question:   Certification     Answer:   I certify that inpatient services are medically necessary for this patient for a duration of greater than two midnights  See H&P and MD Progress Notes for additional information about the patient's course of treatment  ED Arrival Information     Expected Arrival Acuity    - 4/28/2022 20:50 Urgent         Means of arrival Escorted by Service Admission type    Ambulance Critical access hospital Urgent         Arrival complaint    Fall        Chief Complaint   Patient presents with   Charlyne Jaksch Fall     pt via ems from home after mechanical fall down 2 steps where L ankle ended up "underneath of her"  no head hit, no LOC, no thinners  pt unsure if crack heard  +3 pedal pulses  per ems, no deformity but lateral brusing present  Splint by EMS,      OBSERVATION:  Initial Presentation: 62 y o  female :W/PMHX: GERD c/w ppi, seizure disorder c/w home meds, cognitive changes, HTN 2/2 to pain, not maintained on meds,  to ED from Home, admitted as Observation due to Trimalleolar Fracture s/p fall  Presented with slipped while walking down stairs at home, left leg went underneath her rt  Leg and she slid down 7 steps   [place] immediate pain in left foot and called 911  Exam: MM dry, LLE cast, Palpable Dorsalis pedal pulse Left foot  ED work up reveals  Fracture distal left fibula and tibia  Tiny avulsion fracture arising from the distal tip of the medial malleolus  Additionally, there appears to be fracture of the posterior malleolus on the lateral view  Soft tissue swelling surrounding the ankle; there appears to be a left ankle joint effusion  Plan: NWB LLE, PRN analgesia, neurovascular cks, PT/OT, ortho consult,     Ortho Consult: Left Ankle Pain: LLE  Skin intact, tender to palpation over medial and lateral malleoli, painful ankle ROM, sensation intact, DP/SP/TIB/Genaro/SAPH  Positive knee flexion/extension EHL/FHL palpable DP/PT pulses  PROCEDURE: Due to malpositioning of the splint placed in the emergency room  A posterior and stirrup was removed at this time  Ortho glass was then placed in a posterior in sterile fashion with reapplication of Webril and Ace wraps  PLAN: NWB LLE in AO splint,  Ok for d/c from ED, f/u with ortho in 2 wks, with repeat xray  INPATIENT:  Date: 4/30/22: PT/OT recommending Acute Short term rehab d/t pt in multi-level home and lives alone,  Awaiting review for  Acute short term  rehab  Placement for safe d/c planning  Case management working on placement  C/w ot/pt, prn analgesia, dvt ppx  Intake/Output Summary (Last 24 hours) at 4/30/2022 1111  Last data filed at 4/30/2022 6249      Gross per 24 hour   Intake --   Output 1200 ml   Net -1200 ml       DATE 5/1/22 DAY 2: pt awaiting STR placement  C/W  O/T and P/T, dvt ppx, prn analgesia, oob as tolerated  Awaiting safe d/c planning        Intake/Output Summary (Last 24 hours) at 5/1/2022 1026  Last data filed at 4/30/2022 2343      Gross per 24 hour   Intake --   Output 725 ml   Net -725 ml        ED Triage Vitals   Temperature Pulse Respirations Blood Pressure SpO2   04/28/22 2055 04/28/22 2055 04/28/22 2055 04/28/22 2055 04/28/22 2055   97 7 °F (36 5 °C) 75 18 (!) 182/86 100 %      Temp Source Heart Rate Source Patient Position - Orthostatic VS BP Location FiO2 (%)   04/28/22 2055 04/28/22 2055 04/28/22 2055 04/28/22 2055 --   Oral Monitor Lying Right arm       Pain Score       04/28/22 2308       5          Wt Readings from Last 1 Encounters:   04/29/22 77 5 kg (170 lb 13 7 oz)     Additional Vital Signs:   /Time Temp Pulse Resp BP MAP (mmHg) SpO2 O2 Device Patient Position - Orthostatic VS   05/01/22 1539 97 9 °F (36 6 °C) 81 18 123/75 -- 97 % None (Room air) Lying   05/01/22 0715 97 9 °F (36 6 °C) 75 18 132/63 91 97 % None (Room air) Sitting       /Time Temp Pulse Resp BP MAP (mmHg) SpO2 O2 Device Patient Position - Orthostatic VS   04/30/22 0745 -- 96 18 116/69 -- 96 % None (Room air) Lying   04/29/22 2253 98 6 °F (37 °C) 92 18 119/53 74 96 % None (Room air) Lying   04/29/22 1528 97 5 °F (36 4 °C) 74 18 129/59 86 100 % None (Room air) Sitting   04/29/22 0800 -- -- -- -- -- -- None (Room air) --   04/29/22 0759 97 4 °F (36 3 °C) Abnormal  73 20 139/69 -- 98 % None (Room air) Lying   04/29/22 0144 97 7 °F (36 5 °C) 81 20 161/72 -- 100 % None (Room air) Lying   04/29/22 0127 -- 74 18 165/78 -- 100 % None (Room air) Lying   04/28/22 2308 -- 86 18 175/94 Abnormal  -- 100 % None (Room air) Lying     Circulation    Date and Time R Radial Pulse L Radial Pulse R Pedal Pulse L Pedal Pulse   04/30/22 0828 +3 +3 +2 Unable to assess   04/29/22 0800 +3 +3 +2 --   04/29/22 0402 -- -- +2 Unable to assess       Pertinent Labs/Diagnostic Test Results:   XR ankle 3+ views LEFT   Final Result by Dai Escamilla MD (04/28 9074)      Fractures of the distal left fibula and tibia, as described above  Please see discussion  The images are available for clinical review  The study was marked in [facility] for immediate notification              Workstation performed: [id number]               Results from last 7 days   Lab Units 04/29/22  0428 04/29/22  0031   WBC Thousand/uL 6 70 5 02   HEMOGLOBIN g/dL 12 4 13 8   HEMATOCRIT % 38 1 41 8   PLATELETS Thousands/uL 203 207   NEUTROS ABS Thousands/µL 4 81 3 00         Results from last 7 days   Lab Units 04/29/22  0428 04/29/22  0031   SODIUM mmol/L 141 141   POTASSIUM mmol/L 3 7 3 5   CHLORIDE mmol/L 108 107   CO2 mmol/L 24 26   ANION GAP mmol/L 9 8   BUN mg/dL 16 18   CREATININE mg/dL 0 84 1 01   EGFR ml/min/1 73sq m 75 60   CALCIUM mg/dL 9 1 9 4     Results from last 7 days   Lab Units 04/29/22  0031   AST U/L 15   ALT U/L 21   ALK PHOS U/L 81   TOTAL PROTEIN g/dL 7 4   ALBUMIN g/dL 4 1   TOTAL BILIRUBIN mg/dL 0 41         Results from last 7 days   Lab Units 04/29/22 0428 04/29/22  0031   GLUCOSE RANDOM mg/dL 95 101           Past Medical History:   Diagnosis Date    Hypertension     [facility]izure ([facility] )     Thyroid disease      Present on Admission:   Trimalleolar fracture of ankle, closed, left, initial encounter   Essential hypertension   Localization-related focal epilepsy with complex partial seizures ([facility] )   GERD (gastroesophageal reflux disease)   Cognitive changes      Admitting Diagnosis: Right leg injury [S89 91XA]  Trimalleolar fracture of ankle, closed, left, initial encounter [S82 852A]  Age/Sex: 62 y o  female  Admission Orders:Reg diet, nwb/LLE, up with assistance, OT/PT,  scd  Scheduled Medications:  calcium carbonate-vitamin D, 1 tablet, Oral, Daily With Dinner  enoxaparin, 40 mg, Subcutaneous, Daily  lacosamide, 100 mg, Oral, Daily Before Breakfast  lacosamide, 200 mg, Oral, After Dinner  lamoTRIgine, 200 mg, Oral, BID  pantoprazole, 40 mg, Oral, Early Morning  zonisamide, 100 mg, Oral, Daily Before Breakfast  zonisamide, 200 mg, Oral, After Dinner      Continuous IV Infusions:none     PRN Meds:  acetaminophen, 975 mg, Oral, Q6H PRN 4/29 x2, 4/30 x1   aluminum-magnesium hydroxide-simethicone, 30 mL, Oral, Q6H PRN  HYDROmorphone, 0 2 mg, Intravenous, Q6H PRN  ondansetron, 4 mg, Intravenous, Q6H PRN  oxyCODONE, 2 5 mg, Oral, Q4H PRN  oxyCODONE, 5 mg, Oral, Q4H PRN  simethicone, 80 mg, Oral, 4x Daily PRN        IP CONSULT TO ORTHOPEDIC SURGERY  IP CONSULT TO CASE MANAGEMENT    Network Utilization Review Department  ATTENTION: Please call with any questions or concerns to [phone] and carefully listen to the prompts so that you are directed to the right person  All voicemails are confidential   [employer] all requests for admission clinical reviews, approved or denied determinations and any other requests to dedicated fax number below belonging to the campus where the patient is receiving treatment   List of dedicated fax numbers for the Facilities:  [address] DENIALS (Administrative/Medical Necessity) [phone]   [address] (Maternity/NICU/Pediatrics) [phone]   [address]  [address] [facility] [place] [address] [address] [place] [address] [address] [facility] [place] [address] [phone]

## 2022-04-30 NOTE — PLAN OF CARE
Problem: MOBILITY - ADULT  Goal: Maintain or return to baseline ADL function  Description: INTERVENTIONS:  -  Assess patient's ability to carry out ADLs; assess patient's baseline for ADL function and identify physical deficits which impact ability to perform ADLs (bathing, care of mouth/teeth, toileting, grooming, dressing, etc )  - Assess/evaluate cause of self-care deficits   - Assess range of motion  - Assess patient's mobility; develop plan if impaired  - Assess patient's need for assistive devices and provide as appropriate  - Encourage maximum independence but intervene and supervise when necessary  - Involve family in performance of ADLs  - Assess for home care needs following discharge   - Consider OT consult to assist with ADL evaluation and planning for discharge  - Provide patient education as appropriate  Outcome: Progressing  Goal: Maintains/Returns to pre admission functional level  Description: INTERVENTIONS:  - Perform BMAT or MOVE assessment daily    - Set and communicate daily mobility goal to care team and patient/family/caregiver  - Collaborate with rehabilitation services on mobility goals if consulted  - Perform Range of Motion 2 times a day  - Reposition patient every 2 hours    - Dangle patient 2 times a day  - Stand patient 2 times a day  - Ambulate patient 2 times a day  - Out of bed to chair 2 times a day   - Out of bed for meals 2 times a day  - Out of bed for toileting  - Record patient progress and toleration of activity level   Outcome: Progressing     Problem: Potential for Falls  Goal: Patient will remain free of falls  Description: INTERVENTIONS:  - Educate patient/family on patient safety including physical limitations  - Instruct patient to call for assistance with activity   - Consult OT/PT to assist with strengthening/mobility   - Keep Call bell within reach  - Keep bed low and locked with side rails adjusted as appropriate  - Keep care items and personal belongings within reach  - Initiate and maintain comfort rounds  - Make Fall Risk Sign visible to staff  - Offer Toileting every 2 Hours, in advance of need  - Initiate/Maintain bed alarm  - Obtain necessary fall risk management equipment  - Apply yellow socks and bracelet for high fall risk patients  - Consider moving patient to room near nurses station  Outcome: Progressing     Problem: PAIN - ADULT  Goal: Verbalizes/displays adequate comfort level or baseline comfort level  Description: Interventions:  - Encourage patient to monitor pain and request assistance  - Assess pain using appropriate pain scale  - Administer analgesics based on type and severity of pain and evaluate response  - Implement non-pharmacological measures as appropriate and evaluate response  - Consider cultural and social influences on pain and pain management  - Notify physician/advanced practitioner if interventions unsuccessful or patient reports new pain  Outcome: Progressing     Problem: INFECTION - ADULT  Goal: Absence or prevention of progression during hospitalization  Description: INTERVENTIONS:  - Assess and monitor for signs and symptoms of infection  - Monitor lab/diagnostic results  - Monitor all insertion sites, i e  indwelling lines, tubes, and drains  - Monitor endotracheal if appropriate and nasal secretions for changes in amount and color  - [place] appropriate cooling/warming therapies per order  - Administer medications as ordered  - Instruct and encourage patient and family to use good hand hygiene technique  - Identify and instruct in appropriate isolation precautions for identified infection/condition  Outcome: Progressing  Goal: Absence of fever/infection during neutropenic period  Description: INTERVENTIONS:  - Monitor WBC    Outcome: Progressing     Problem: SAFETY ADULT  Goal: Maintain or return to baseline ADL function  Description: INTERVENTIONS:  -  Assess patient's ability to carry out ADLs; assess patient's baseline for ADL function and identify physical deficits which impact ability to perform ADLs (bathing, care of mouth/teeth, toileting, grooming, dressing, etc )  - Assess/evaluate cause of self-care deficits   - Assess range of motion  - Assess patient's mobility; develop plan if impaired  - Assess patient's need for assistive devices and provide as appropriate  - Encourage maximum independence but intervene and supervise when necessary  - Involve family in performance of ADLs  - Assess for home care needs following discharge   - Consider OT consult to assist with ADL evaluation and planning for discharge  - Provide patient education as appropriate  Outcome: Progressing  Goal: Maintains/Returns to pre admission functional level  Description: INTERVENTIONS:  - Perform BMAT or MOVE assessment daily    - Set and communicate daily mobility goal to care team and patient/family/caregiver  - Collaborate with rehabilitation services on mobility goals if consulted  - Perform Range of Motion 2 times a day  - Reposition patient every 2 hours    - Dangle patient 2 times a day  - Stand patient 2 times a day  - Ambulate patient 2 times a day  - Out of bed to chair 2 times a day   - Out of bed for meals 2 times a day  - Out of bed for toileting  - Record patient progress and toleration of activity level   Outcome: Progressing  Goal: Patient will remain free of falls  Description: INTERVENTIONS:  - Educate patient/family on patient safety including physical limitations  - Instruct patient to call for assistance with activity   - Consult OT/PT to assist with strengthening/mobility   - Keep Call bell within reach  - Keep bed low and locked with side rails adjusted as appropriate  - Keep care items and personal belongings within reach  - Initiate and maintain comfort rounds  - Make Fall Risk Sign visible to staff  - Offer Toileting every 2 Hours, in advance of need  - Initiate/Maintain bed alarm  - Obtain necessary fall risk management equipment  - Apply yellow socks and bracelet for high fall risk patients  - Consider moving patient to room near nurses station  Outcome: Progressing     Problem: DISCHARGE PLANNING  Goal: Discharge to home or other facility with appropriate resources  Description: INTERVENTIONS:  - Identify barriers to discharge w/patient and caregiver  - Arrange for needed discharge resources and transportation as appropriate  - Identify discharge learning needs (meds, wound care, etc )  - Arrange for interpretive services to assist at discharge as needed  - Refer to Case Management Department for coordinating discharge planning if the patient needs post-hospital services based on physician/advanced practitioner order or complex needs related to functional status, cognitive ability, or social support system  Outcome: Progressing     Problem: Knowledge Deficit  Goal: Patient/family/caregiver demonstrates understanding of disease process, treatment plan, medications, and discharge instructions  Description: Complete learning assessment and assess knowledge base    Interventions:  - Provide teaching at level of understanding  - Provide teaching via preferred learning methods  Outcome: Progressing     Problem: Prexisting or High Potential for Compromised Skin Integrity  Goal: Skin integrity is maintained or improved  Description: INTERVENTIONS:  - Identify patients at risk for skin breakdown  - Assess and monitor skin integrity  - Assess and monitor nutrition and hydration status  - Monitor labs   - Assess for incontinence   - Turn and reposition patient  - Assist with mobility/ambulation  - Relieve pressure over bony prominences  - Avoid friction and shearing  - Provide appropriate hygiene as needed including keeping skin clean and dry  - Evaluate need for skin moisturizer/barrier cream  - Collaborate with interdisciplinary team   - Patient/family teaching  - Consider wound care consult   Outcome: Progressing

## 2022-05-01 PROCEDURE — 99232 SBSQ HOSP IP/OBS MODERATE 35: CPT | Performed by: INTERNAL MEDICINE

## 2022-05-01 RX ORDER — ZONISAMIDE 100 MG/1
100 CAPSULE ORAL DAILY
Status: DISCONTINUED | OUTPATIENT
Start: 2022-05-02 | End: 2022-05-03 | Stop reason: HOSPADM

## 2022-05-01 RX ORDER — LACOSAMIDE 50 MG/1
100 TABLET ORAL DAILY
Status: DISCONTINUED | OUTPATIENT
Start: 2022-05-02 | End: 2022-05-03 | Stop reason: HOSPADM

## 2022-05-01 RX ADMIN — LAMOTRIGINE 200 MG: 100 TABLET ORAL at 17:25

## 2022-05-01 RX ADMIN — PANTOPRAZOLE SODIUM 40 MG: 40 TABLET, DELAYED RELEASE ORAL at 05:40

## 2022-05-01 RX ADMIN — ACETAMINOPHEN 975 MG: 325 TABLET ORAL at 23:21

## 2022-05-01 RX ADMIN — BRIVARACETAM 75 MG: 75 TABLET, FILM COATED ORAL at 17:26

## 2022-05-01 RX ADMIN — LAMOTRIGINE 200 MG: 100 TABLET ORAL at 08:40

## 2022-05-01 RX ADMIN — ZONISAMIDE 200 MG: 100 CAPSULE ORAL at 17:26

## 2022-05-01 RX ADMIN — LACOSAMIDE 200 MG: 150 TABLET, FILM COATED ORAL at 17:25

## 2022-05-01 RX ADMIN — BRIVARACETAM 75 MG: 75 TABLET, FILM COATED ORAL at 05:40

## 2022-05-01 RX ADMIN — ENOXAPARIN SODIUM 40 MG: 40 INJECTION SUBCUTANEOUS at 17:26

## 2022-05-01 RX ADMIN — ZONISAMIDE 100 MG: 100 CAPSULE ORAL at 08:40

## 2022-05-01 RX ADMIN — LACOSAMIDE 100 MG: 50 TABLET, FILM COATED ORAL at 08:41

## 2022-05-01 RX ADMIN — Medication 1 TABLET: at 16:21

## 2022-05-01 NOTE — PLAN OF CARE
Problem: MOBILITY - ADULT  Goal: Maintain or return to baseline ADL function  Description: INTERVENTIONS:  -  Assess patient's ability to carry out ADLs; assess patient's baseline for ADL function and identify physical deficits which impact ability to perform ADLs (bathing, care of mouth/teeth, toileting, grooming, dressing, etc )  - Assess/evaluate cause of self-care deficits   - Assess range of motion  - Assess patient's mobility; develop plan if impaired  - Assess patient's need for assistive devices and provide as appropriate  - Encourage maximum independence but intervene and supervise when necessary  - Involve family in performance of ADLs  - Assess for home care needs following discharge   - Consider OT consult to assist with ADL evaluation and planning for discharge  - Provide patient education as appropriate  Outcome: Progressing  Goal: Maintains/Returns to pre admission functional level  Description: INTERVENTIONS:  - Perform BMAT or MOVE assessment daily    - Set and communicate daily mobility goal to care team and patient/family/caregiver  - Collaborate with rehabilitation services on mobility goals if consulted  - Perform Range of Motion 3 times a day  - Reposition patient every 2 hours    - Dangle patient 3times a day  - Stand patient 3 times a day  - Ambulate patient patient NWB to LLE  - Out of bed to chair 3 times a day   - Out of bed for meals 3 times a day  - Out of bed for toileting  - Record patient progress and toleration of activity level   Outcome: Progressing     Problem: Potential for Falls  Goal: Patient will remain free of falls  Description: INTERVENTIONS:  - Educate patient/family on patient safety including physical limitations  - Instruct patient to call for assistance with activity   - Consult OT/PT to assist with strengthening/mobility   - Keep Call bell within reach  - Keep bed low and locked with side rails adjusted as appropriate  - Keep care items and personal belongings within reach  - Initiate and maintain comfort rounds  - Make Fall Risk Sign visible to staff  - Offer Toileting every 2 Hours, in advance of need  - Initiate/Maintain bed alarm  - Obtain necessary fall risk management equipment: walker  - Apply yellow socks and bracelet for high fall risk patients  - Consider moving patient to room near nurses station  Outcome: Progressing     Problem: PAIN - ADULT  Goal: Verbalizes/displays adequate comfort level or baseline comfort level  Description: Interventions:  - Encourage patient to monitor pain and request assistance  - Assess pain using appropriate pain scale  - Administer analgesics based on type and severity of pain and evaluate response  - Implement non-pharmacological measures as appropriate and evaluate response  - Consider cultural and social influences on pain and pain management  - Notify physician/advanced practitioner if interventions unsuccessful or patient reports new pain  Outcome: Progressing

## 2022-05-01 NOTE — ASSESSMENT & PLAN NOTE
Followed outpatient by Neurology, outpatient AEDs:   · Lacosamide 100 mg in the morning and 200 mg at night  · Briviact 75 mg twice a day  Medication delivered to pharmacy  She will be able to provide medication to str     · Zonisamide 100 mg in the morning and 200 mg at night  · Lamotrigine 200 mg twice a day

## 2022-05-01 NOTE — PLAN OF CARE
Problem: MOBILITY - ADULT  Goal: Maintain or return to baseline ADL function  Description: INTERVENTIONS:  -  Assess patient's ability to carry out ADLs; assess patient's baseline for ADL function and identify physical deficits which impact ability to perform ADLs (bathing, care of mouth/teeth, toileting, grooming, dressing, etc )  - Assess/evaluate cause of self-care deficits   - Assess range of motion  - Assess patient's mobility; develop plan if impaired  - Assess patient's need for assistive devices and provide as appropriate  - Encourage maximum independence but intervene and supervise when necessary  - Involve family in performance of ADLs  - Assess for home care needs following discharge   - Consider OT consult to assist with ADL evaluation and planning for discharge  - Provide patient education as appropriate  Outcome: Progressing  Goal: Maintains/Returns to pre admission functional level  Description: INTERVENTIONS:  - Perform BMAT or MOVE assessment daily    - Set and communicate daily mobility goal to care team and patient/family/caregiver  - Collaborate with rehabilitation services on mobility goals if consulted  - Perform Range of Motion 2 times a day  - Reposition patient every 2 hours    - Dangle patient 2 times a day  - Stand patient 2 times a day  - Ambulate patient 2 times a day  - Out of bed to chair 2 times a day   - Out of bed for meals 2 times a day  - Out of bed for toileting  - Record patient progress and toleration of activity level   Outcome: Progressing     Problem: Potential for Falls  Goal: Patient will remain free of falls  Description: INTERVENTIONS:  - Educate patient/family on patient safety including physical limitations  - Instruct patient to call for assistance with activity   - Consult OT/PT to assist with strengthening/mobility   - Keep Call bell within reach  - Keep bed low and locked with side rails adjusted as appropriate  - Keep care items and personal belongings within reach  - Initiate and maintain comfort rounds  - Make Fall Risk Sign visible to staff  - Offer Toileting every 2 Hours, in advance of need  - Initiate/Maintain bed alarm  - Obtain necessary fall risk management equipment  - Apply yellow socks and bracelet for high fall risk patients  - Consider moving patient to room near nurses station  Outcome: Progressing     Problem: PAIN - ADULT  Goal: Verbalizes/displays adequate comfort level or baseline comfort level  Description: Interventions:  - Encourage patient to monitor pain and request assistance  - Assess pain using appropriate pain scale  - Administer analgesics based on type and severity of pain and evaluate response  - Implement non-pharmacological measures as appropriate and evaluate response  - Consider cultural and social influences on pain and pain management  - Notify physician/advanced practitioner if interventions unsuccessful or patient reports new pain  Outcome: Progressing     Problem: INFECTION - ADULT  Goal: Absence or prevention of progression during hospitalization  Description: INTERVENTIONS:  - Assess and monitor for signs and symptoms of infection  - Monitor lab/diagnostic results  - Monitor all insertion sites, i e  indwelling lines, tubes, and drains  - Monitor endotracheal if appropriate and nasal secretions for changes in amount and color  - [place] appropriate cooling/warming therapies per order  - Administer medications as ordered  - Instruct and encourage patient and family to use good hand hygiene technique  - Identify and instruct in appropriate isolation precautions for identified infection/condition  Outcome: Progressing  Goal: Absence of fever/infection during neutropenic period  Description: INTERVENTIONS:  - Monitor WBC    Outcome: Progressing     Problem: SAFETY ADULT  Goal: Maintain or return to baseline ADL function  Description: INTERVENTIONS:  -  Assess patient's ability to carry out ADLs; assess patient's baseline for ADL function and identify physical deficits which impact ability to perform ADLs (bathing, care of mouth/teeth, toileting, grooming, dressing, etc )  - Assess/evaluate cause of self-care deficits   - Assess range of motion  - Assess patient's mobility; develop plan if impaired  - Assess patient's need for assistive devices and provide as appropriate  - Encourage maximum independence but intervene and supervise when necessary  - Involve family in performance of ADLs  - Assess for home care needs following discharge   - Consider OT consult to assist with ADL evaluation and planning for discharge  - Provide patient education as appropriate  Outcome: Progressing  Goal: Maintains/Returns to pre admission functional level  Description: INTERVENTIONS:  - Perform BMAT or MOVE assessment daily    - Set and communicate daily mobility goal to care team and patient/family/caregiver  - Collaborate with rehabilitation services on mobility goals if consulted  - Perform Range of Motion 2 times a day  - Reposition patient every 2 hours    - Dangle patient 2 times a day  - Stand patient 2 times a day  - Ambulate patient 2 times a day  - Out of bed to chair 2 times a day   - Out of bed for meals 2 times a day  - Out of bed for toileting  - Record patient progress and toleration of activity level   Outcome: Progressing  Goal: Patient will remain free of falls  Description: INTERVENTIONS:  - Educate patient/family on patient safety including physical limitations  - Instruct patient to call for assistance with activity   - Consult OT/PT to assist with strengthening/mobility   - Keep Call bell within reach  - Keep bed low and locked with side rails adjusted as appropriate  - Keep care items and personal belongings within reach  - Initiate and maintain comfort rounds  - Make Fall Risk Sign visible to staff  - Offer Toileting every 2 Hours, in advance of need  - Initiate/Maintain bed alarm  - Obtain necessary fall risk management equipment  - Apply yellow socks and bracelet for high fall risk patients  - Consider moving patient to room near nurses station  Outcome: Progressing     Problem: DISCHARGE PLANNING  Goal: Discharge to home or other facility with appropriate resources  Description: INTERVENTIONS:  - Identify barriers to discharge w/patient and caregiver  - Arrange for needed discharge resources and transportation as appropriate  - Identify discharge learning needs (meds, wound care, etc )  - Arrange for interpretive services to assist at discharge as needed  - Refer to Case Management Department for coordinating discharge planning if the patient needs post-hospital services based on physician/advanced practitioner order or complex needs related to functional status, cognitive ability, or social support system  Outcome: Progressing     Problem: Knowledge Deficit  Goal: Patient/family/caregiver demonstrates understanding of disease process, treatment plan, medications, and discharge instructions  Description: Complete learning assessment and assess knowledge base    Interventions:  - Provide teaching at level of understanding  - Provide teaching via preferred learning methods  Outcome: Progressing     Problem: Prexisting or High Potential for Compromised Skin Integrity  Goal: Skin integrity is maintained or improved  Description: INTERVENTIONS:  - Identify patients at risk for skin breakdown  - Assess and monitor skin integrity  - Assess and monitor nutrition and hydration status  - Monitor labs   - Assess for incontinence   - Turn and reposition patient  - Assist with mobility/ambulation  - Relieve pressure over bony prominences  - Avoid friction and shearing  - Provide appropriate hygiene as needed including keeping skin clean and dry  - Evaluate need for skin moisturizer/barrier cream  - Collaborate with interdisciplinary team   - Patient/family teaching  - Consider wound care consult   Outcome: Progressing

## 2022-05-01 NOTE — PROGRESS NOTES
[facility]  Progress Note - Adam Ace 1960, 62 y o  female MRN: [id number]  Unit/Bed#: E2 -01 Encounter: [id number]  Primary Care Provider: Baylee Bar DO   Date and time admitted to hospital: 4/28/2022  8:51 PM    Cognitive changes  Assessment & Plan  · Supportive care    GERD (gastroesophageal reflux disease)  Assessment & Plan  · Continue PPI    Essential hypertension  Assessment & Plan  · Elevated /86, 175/94  · Not maintained on antihypertensive medications  Likely pain induced  · bp currently stable off of meds    Localization-related focal epilepsy with complex partial seizures [facility])  Assessment & Plan  Followed outpatient by Neurology, outpatient AEDs:   · Lacosamide 100 mg in the morning and 200 mg at night  · Briviact 75 mg twice a day  Medication delivered to pharmacy  She will be able to provide medication to str  · Zonisamide 100 mg in the morning and 200 mg at night  · Lamotrigine 200 mg twice a day     * Trimalleolar fracture of ankle, closed, left, initial encounter  Assessment & Plan  · Presents status post fall down the stairs at home  · XR ankle: Fractures of the distal left fibula and tibia  Tiny avulsion fracture arising from the distal tip of the medial malleolus  Additionally, there appears to be fracture of the posterior malleolus on the lateral view  Soft tissue swelling surrounding the ankle; there appears to be a left ankle joint effusion  · NWB LLE  · P r n  Analgesics  · Neurovascular checks  · PT OT recommended str  · Appreciate ortho recommendation: S/p splint  follow up with ortho in 2 weeks for repeat xray and placement of camboot vs cast        VTE Pharmacologic Prophylaxis: lovenox  Mechanical VTE Prophylaxis in Place: Yes    Education and Discussions with Family / Patient: declined call as her sister is in [place]     Time Spent for Care: 20 minutes    More than 50% of total time spent on counseling and coordination of care as described above  Current Length of Stay: 1 day(s)  Current Patient Status: Inpatient     Discharge Plan / Estimated Discharge Date: awaiting str    Code Status: Level 1 - Full Code      Subjective:   Pt seen and examined  Pt doing well  She was able to sleep better last night  She denies increase in pain  No f/c no cp no sob no n/v/d no abd pain  Objective:     Vitals:   Temp (24hrs), Av 6 °F (37 °C), Min:97 9 °F (36 6 °C), Max:99 4 °F (37 4 °C)    Temp:  [97 9 °F (36 6 °C)-99 4 °F (37 4 °C)] 97 9 °F (36 6 °C)  HR:  [75-80] 75  Resp:  [18] 18  BP: (124-132)/(63) 132/63  SpO2:  [96 %-97 %] 97 %  Body mass index is 28 43 kg/m²  Input and Output Summary (last 24 hours): Intake/Output Summary (Last 24 hours) at 2022 1026  Last data filed at 2022 2343  Gross per 24 hour   Intake --   Output 725 ml   Net -725 ml       Physical Exam:   Physical Exam  Constitutional:       Appearance: Normal appearance  HENT:      Head: Normocephalic and atraumatic  Eyes:      Extraocular Movements: Extraocular movements intact  Pupils: Pupils are equal, round, and reactive to light  Cardiovascular:      Rate and Rhythm: Normal rate and regular rhythm  Heart sounds: No murmur heard  No friction rub  No gallop  Pulmonary:      Effort: Pulmonary effort is normal  No respiratory distress  Breath sounds: Normal breath sounds  No wheezing or rales  Abdominal:      General: Bowel sounds are normal  There is no distension  Palpations: Abdomen is soft  Tenderness: There is no abdominal tenderness  There is no guarding  Neurological:      Mental Status: She is alert and oriented to person, place, and time            Additional Data:     Labs:  Results from last 7 days   Lab Units 22  0428   WBC Thousand/uL 6 70   HEMOGLOBIN g/dL 12 4   HEMATOCRIT % 38 1   PLATELETS Thousands/uL 203   NEUTROS PCT % 72   LYMPHS PCT % 19   MONOS PCT % 9   EOS PCT % 0     Results from last 7 days   Lab Units 04/29/22  0428 04/29/22  0031 04/29/22  0031   SODIUM mmol/L 141   < > 141   POTASSIUM mmol/L 3 7   < > 3 5   CHLORIDE mmol/L 108   < > 107   CO2 mmol/L 24   < > 26   BUN mg/dL 16   < > 18   CREATININE mg/dL 0 84   < > 1 01   ANION GAP mmol/L 9   < > 8   CALCIUM mg/dL 9 1   < > 9 4   ALBUMIN g/dL  --   --  4 1   TOTAL BILIRUBIN mg/dL  --   --  0 41   ALK PHOS U/L  --   --  81   ALT U/L  --   --  21   AST U/L  --   --  15   GLUCOSE RANDOM mg/dL 95   < > 101    < > = values in this interval not displayed                           Recent Cultures (last 7 days):           Lines/Drains:  Invasive Devices  Report    Peripheral Intravenous Line            Peripheral IV 04/29/22 Left Antecubital 2 days              Last 24 Hours Medication List:   Current Facility-Administered Medications   Medication Dose Route Frequency Provider Last Rate    acetaminophen  975 mg Oral Q6H PRN SHAQ Martini      aluminum-magnesium hydroxide-simethicone  30 mL Oral Q6H PRN SHAQ Martini      Brivaracetam  75 mg Oral BID Aimee Ambkevin, DO      calcium carbonate-vitamin D  1 tablet Oral Daily With Sushma-Wiley, DO      enoxaparin  40 mg Subcutaneous Daily SHAQ Martini      HYDROmorphone  0 2 mg Intravenous Q6H PRN SHAQ Martini      [START ON 5/2/2022] lacosamide  100 mg Oral Daily Aimee Richardron,       lacosamide  200 mg Oral After Dinner SHAQ Martini      lamoTRIgine  200 mg Oral BID SHAQ Martini      ondansetron  4 mg Intravenous Q6H PRN SHAQ Martini      oxyCODONE  2 5 mg Oral Q4H PRN SHAQ Martini      oxyCODONE  5 mg Oral Q4H PRN SHAQ Martini      pantoprazole  40 mg Oral Early Morning SHAQ Martini      simethicone  80 mg Oral 4x Daily PRN SHAQ Martini      [START ON 5/2/2022] zonisamide  100 mg Oral Daily Aimee Ambron, DO      zonisamide  200 mg Oral After [employer] SHAQ Queen          Today, Patient Was Seen By: Hamilton Barr, DO

## 2022-05-02 ENCOUNTER — APPOINTMENT (INPATIENT)
Dept: RADIOLOGY | Facility: HOSPITAL | Age: 62
DRG: 563 | End: 2022-05-02
Payer: COMMERCIAL

## 2022-05-02 PROBLEM — M25.562 LEFT KNEE PAIN: Status: ACTIVE | Noted: 2022-05-02

## 2022-05-02 LAB
ANION GAP SERPL CALCULATED.3IONS-SCNC: 7 MMOL/L (ref 4–13)
BUN SERPL-MCNC: 17 MG/DL (ref 5–25)
CALCIUM SERPL-MCNC: 9 MG/DL (ref 8.3–10.1)
CHLORIDE SERPL-SCNC: 109 MMOL/L (ref 100–108)
CO2 SERPL-SCNC: 24 MMOL/L (ref 21–32)
CREAT SERPL-MCNC: 1.02 MG/DL (ref 0.6–1.3)
ERYTHROCYTE [DISTWIDTH] IN BLOOD BY AUTOMATED COUNT: 12.2 % (ref 11.6–15.1)
FLUAV RNA RESP QL NAA+PROBE: NEGATIVE
FLUBV RNA RESP QL NAA+PROBE: NEGATIVE
GFR SERPL CREATININE-BSD FRML MDRD: 59 ML/MIN/1.73SQ M
GLUCOSE SERPL-MCNC: 92 MG/DL (ref 65–140)
HCT VFR BLD AUTO: 36.9 % (ref 34.8–46.1)
HGB BLD-MCNC: 12 G/DL (ref 11.5–15.4)
MCH RBC QN AUTO: 31.7 PG (ref 26.8–34.3)
MCHC RBC AUTO-ENTMCNC: 32.5 G/DL (ref 31.4–37.4)
MCV RBC AUTO: 97 FL (ref 82–98)
PLATELET # BLD AUTO: 195 THOUSANDS/UL (ref 149–390)
PMV BLD AUTO: 10 FL (ref 8.9–12.7)
POTASSIUM SERPL-SCNC: 4 MMOL/L (ref 3.5–5.3)
RBC # BLD AUTO: 3.79 MILLION/UL (ref 3.81–5.12)
RSV RNA RESP QL NAA+PROBE: NEGATIVE
SARS-COV-2 RNA RESP QL NAA+PROBE: NEGATIVE
SODIUM SERPL-SCNC: 140 MMOL/L (ref 136–145)
WBC # BLD AUTO: 3.81 THOUSAND/UL (ref 4.31–10.16)

## 2022-05-02 PROCEDURE — 97535 SELF CARE MNGMENT TRAINING: CPT

## 2022-05-02 PROCEDURE — 85027 COMPLETE CBC AUTOMATED: CPT | Performed by: INTERNAL MEDICINE

## 2022-05-02 PROCEDURE — 73564 X-RAY EXAM KNEE 4 OR MORE: CPT

## 2022-05-02 PROCEDURE — 97116 GAIT TRAINING THERAPY: CPT

## 2022-05-02 PROCEDURE — 0241U HB NFCT DS VIR RESP RNA 4 TRGT: CPT | Performed by: PHYSICIAN ASSISTANT

## 2022-05-02 PROCEDURE — 80048 BASIC METABOLIC PNL TOTAL CA: CPT | Performed by: INTERNAL MEDICINE

## 2022-05-02 PROCEDURE — 97530 THERAPEUTIC ACTIVITIES: CPT

## 2022-05-02 PROCEDURE — 99232 SBSQ HOSP IP/OBS MODERATE 35: CPT | Performed by: PHYSICIAN ASSISTANT

## 2022-05-02 RX ORDER — ENOXAPARIN SODIUM 100 MG/ML
40 INJECTION SUBCUTANEOUS
Status: DISCONTINUED | OUTPATIENT
Start: 2022-05-02 | End: 2022-05-03 | Stop reason: HOSPADM

## 2022-05-02 RX ORDER — OXYCODONE HYDROCHLORIDE 5 MG/1
2.5 TABLET ORAL EVERY 4 HOURS PRN
Qty: 20 TABLET | Refills: 0 | Status: SHIPPED | OUTPATIENT
Start: 2022-05-02 | End: 2022-05-12

## 2022-05-02 RX ADMIN — LAMOTRIGINE 200 MG: 100 TABLET ORAL at 17:46

## 2022-05-02 RX ADMIN — LACOSAMIDE 200 MG: 150 TABLET, FILM COATED ORAL at 17:46

## 2022-05-02 RX ADMIN — Medication 1 TABLET: at 17:46

## 2022-05-02 RX ADMIN — LACOSAMIDE 100 MG: 50 TABLET, FILM COATED ORAL at 08:10

## 2022-05-02 RX ADMIN — BRIVARACETAM 75 MG: 75 TABLET, FILM COATED ORAL at 17:46

## 2022-05-02 RX ADMIN — BRIVARACETAM 75 MG: 75 TABLET, FILM COATED ORAL at 08:10

## 2022-05-02 RX ADMIN — PANTOPRAZOLE SODIUM 40 MG: 40 TABLET, DELAYED RELEASE ORAL at 05:42

## 2022-05-02 RX ADMIN — ACETAMINOPHEN 975 MG: 325 TABLET ORAL at 20:35

## 2022-05-02 RX ADMIN — ZONISAMIDE 200 MG: 100 CAPSULE ORAL at 17:46

## 2022-05-02 RX ADMIN — ENOXAPARIN SODIUM 40 MG: 40 INJECTION SUBCUTANEOUS at 17:46

## 2022-05-02 RX ADMIN — ZONISAMIDE 100 MG: 100 CAPSULE ORAL at 08:09

## 2022-05-02 RX ADMIN — LAMOTRIGINE 200 MG: 100 TABLET ORAL at 08:09

## 2022-05-02 NOTE — PLAN OF CARE
Problem: MOBILITY - ADULT  Goal: Maintain or return to baseline ADL function  Description: INTERVENTIONS:  -  Assess patient's ability to carry out ADLs; assess patient's baseline for ADL function and identify physical deficits which impact ability to perform ADLs (bathing, care of mouth/teeth, toileting, grooming, dressing, etc )  - Assess/evaluate cause of self-care deficits   - Assess range of motion  - Assess patient's mobility; develop plan if impaired  - Assess patient's need for assistive devices and provide as appropriate  - Encourage maximum independence but intervene and supervise when necessary  - Involve family in performance of ADLs  - Assess for home care needs following discharge   - Consider OT consult to assist with ADL evaluation and planning for discharge  - Provide patient education as appropriate  Outcome: Progressing  Goal: Maintains/Returns to pre admission functional level  Description: INTERVENTIONS:  - Perform BMAT or MOVE assessment daily    - Set and communicate daily mobility goal to care team and patient/family/caregiver  - Collaborate with rehabilitation services on mobility goals if consulted  - Perform Range of Motion 3 times a day  - Reposition patient every 2 hours    - Dangle patient 3 times a day  - Stand patient 3 times a day  - Ambulate patient 3 times a day  - Out of bed to chair 3 times a day   - Out of bed for meals 3 times a day  - Out of bed for toileting  - Record patient progress and toleration of activity level   Outcome: Progressing     Problem: PAIN - ADULT  Goal: Verbalizes/displays adequate comfort level or baseline comfort level  Description: Interventions:  - Encourage patient to monitor pain and request assistance  - Assess pain using appropriate pain scale  - Administer analgesics based on type and severity of pain and evaluate response  - Implement non-pharmacological measures as appropriate and evaluate response  - Consider cultural and social influences on pain and pain management  - Notify physician/advanced practitioner if interventions unsuccessful or patient reports new pain  Outcome: Progressing     Problem: INFECTION - ADULT  Goal: Absence or prevention of progression during hospitalization  Description: INTERVENTIONS:  - Assess and monitor for signs and symptoms of infection  - Monitor lab/diagnostic results  - Monitor all insertion sites, i e  indwelling lines, tubes, and drains  - Monitor endotracheal if appropriate and nasal secretions for changes in amount and color  - [place] appropriate cooling/warming therapies per order  - Administer medications as ordered  - Instruct and encourage patient and family to use good hand hygiene technique  - Identify and instruct in appropriate isolation precautions for identified infection/condition  Outcome: Progressing  Goal: Absence of fever/infection during neutropenic period  Description: INTERVENTIONS:  - Monitor WBC    Outcome: Progressing

## 2022-05-02 NOTE — PLAN OF CARE
Problem: PHYSICAL THERAPY ADULT  Goal: Performs mobility at highest level of function for planned discharge setting  See evaluation for individualized goals  Description: Treatment/Interventions: Functional transfer training  Equipment Recommended: Wheelchair,Walker       See flowsheet documentation for full assessment, interventions and recommendations  Outcome: Progressing  Note: Prognosis: Good  Problem List: Decreased endurance,Impaired balance,Decreased mobility,Orthopedic restrictions,Pain  Assessment: Pt seen for PT treatment session  Pt  Is requiring min assist x1 for transfers and ambulation due to impairments in balance, safety, and mobility  Pt  Requires verbal cues for hand placement  Min assist for steadying assist with dynamic standing balance activities while performing ADL's  Pt  Performs car transfers simulated with staxi chair with min assist for stand to sit and supervision for pivoting around to front of chair  Pt  With limited session due to transport coming to take pt for x- ray of l knee due to reports of increased L knee pain and swelling  Pt will benefit from continued inpt PT for mobility, transfer and endurance training and le there ex for strengthening  Recommend STR at d/c due to decreased care giver support as pt lives alone  In order to maximize functional outcomes, mobilty and independence for safe d/c to home as pt lives alone  Should pt decline STR and go home recommend Rw and w/c vs rollabout which pt reports she can borrow from a friend and tub bench seat  Would recommend hhpt if pt d/c to home  Barriers to Discharge: Decreased caregiver support,Inaccessible home environment        PT Discharge Recommendation: Post acute rehabilitation services          See flowsheet documentation for full assessment

## 2022-05-02 NOTE — ASSESSMENT & PLAN NOTE
· Presents status post fall down the stairs at home  · XR ankle: Fractures of the distal left fibula and tibia  Tiny avulsion fracture arising from the distal tip of the medial malleolus  Additionally, there appears to be fracture of the posterior malleolus on the lateral view  Soft tissue swelling surrounding the ankle; there appears to be a left ankle joint effusion  · NWB LLE in AO splint   · P r n  Analgesics  · PT OT recommended str  · Awaiting placement, check COVID swab today   · Appreciate ortho recommendation: S/p splint   follow up with ortho in 2 weeks for repeat xray and placement of camboot vs cast

## 2022-05-02 NOTE — CASE MANAGEMENT
Case Management Progress Note    Patient name Flako Fernando  Location East 2 /E2 -* MRN [id number]  : 1960 Date 2022       LOS (days): 2  Geometric Mean LOS (GMLOS) (days):   Days to GMLOS:        OBJECTIVE:     Current admission status: Inpatient  Preferred Pharmacy:   [facility]  Ryan Yolanda, [address] [facility]  [address] [place] [address]  Phone: [phone] [facility]  [address]  Phone: [phone] Fax: [phone]    Primary Care Provider: Javi Dc DO    Primary Insurance: BLUE CROSS  Secondary Insurance:     PROGRESS NOTE:    Pt has been accepted by [facility] pending insurance auth  CM discharge support team initiated auth  Will advise when a determination has been made

## 2022-05-02 NOTE — ASSESSMENT & PLAN NOTE
· Presents status post fall down the stairs at home  · XR ankle: Fractures of the distal left fibula and tibia  Tiny avulsion fracture arising from the distal tip of the medial malleolus  Additionally, there appears to be fracture of the posterior malleolus on the lateral view  Soft tissue swelling surrounding the ankle; there appears to be a left ankle joint effusion  · NWB LLE in AO splint   · P r n  Analgesics  · PT OT recommended str  · Working on placement and auth   · COVID swab negative   · Scripts in chart   · Appreciate ortho recommendation: S/p splint   follow up with ortho in 2 weeks for repeat xray and placement of camboot vs cast

## 2022-05-02 NOTE — PLAN OF CARE
Problem: OCCUPATIONAL THERAPY ADULT  Goal: Performs self-care activities at highest level of function for planned discharge setting  See evaluation for individualized goals  Description:Occupational Therapy Treatment Note:      Outcome: Progressing  Note: Limitation: Decreased ADL status,Decreased Safe judgement during ADL,Decreased endurance,Decreased high-level ADLs  Prognosis: Good  Assessment: Pt was seen for skilled OT with focus on completion of bed mobility, LE dressing activities, functional transfers, self toileting and review of current plan of care  Pt was able to complete functional transfers this tx session with Min A x1 while maintaining NWB into affected LLE  See above levels of A required for all functional tasks  Pt was able to tolerate functional tasks instance without LOB for more than 5 mins with Min A x1 and RW  Pt will benefit from continued use of RW as needed to maintain functional balance with self care tasks and LE dressing  The patient's raw score on the AM-PAC Daily Activity inpatient short form is 21, standardized score is 44 27, less than 39 4  Patients at this level are likely to benefit from discharge to home with home therapies        OT Discharge Recommendation: Post acute rehabilitation services

## 2022-05-02 NOTE — PROGRESS NOTES
[address] [place] [address]  Progress Note - Alla Constant 1960, 62 y o  female MRN: [id number]  Unit/Bed#: E2 -01 Encounter: [id number]  Primary Care Provider: Ainsley Melara DO   Date and time admitted to hospital: 4/28/2022  8:51 PM    * Trimalleolar fracture of ankle, closed, left, initial encounter  Assessment & Plan  · Presents status post fall down the stairs at home  · XR ankle: Fractures of the distal left fibula and tibia  Tiny avulsion fracture arising from the distal tip of the medial malleolus  Additionally, there appears to be fracture of the posterior malleolus on the lateral view  Soft tissue swelling surrounding the ankle; there appears to be a left ankle joint effusion  · NWB LLE in AO splint   · P r n  Analgesics  · PT OT recommended str  · Awaiting placement, check COVID swab today   · Appreciate ortho recommendation: S/p splint  follow up with ortho in 2 weeks for repeat xray and placement of camboot vs cast      Left knee pain  Assessment & Plan  Check xray left knee today     Cognitive changes  Assessment & Plan  · Supportive care    GERD (gastroesophageal reflux disease)  Assessment & Plan  · Continue PPI    Essential hypertension  Assessment & Plan  · Elevated /86, 175/94  · Not maintained on antihypertensive medications  Likely pain induced  · bp currently stable off of meds    Localization-related focal epilepsy with complex partial seizures [facility])  Assessment & Plan  Followed outpatient by Neurology, outpatient AEDs:   · Lacosamide 100 mg in the morning and 200 mg at night  · Briviact 75 mg twice a day  Medication delivered to pharmacy  She will be able to provide medication to str  · Zonisamide 100 mg in the morning and 200 mg at night  · Lamotrigine 200 mg twice a day         VTE Pharmacologic Prophylaxis: VTE Score: 7 High Risk (Score >/= 5) - Pharmacological DVT Prophylaxis Ordered: enoxaparin (Lovenox)   Sequential Compression Devices Ordered  Patient Centered Rounds: I performed bedside rounds with nursing staff today  Discussions with Specialists or Other Care Team Provider: CM    Education and Discussions with Family / Patient: Patient declined call to   Time Spent for Care: 20 minutes  More than 50% of total time spent on counseling and coordination of care as described above  Current Length of Stay: 2 day(s)  Current Patient Status: Inpatient   Certification Statement: The patient will continue to require additional inpatient hospital stay due to rehab placement   Discharge Plan: later today versus tomorrow to rehab     Code Status: Level 1 - Full Code    Subjective:   Patient doing well  Some left knee pain  No other complaints  Objective:     Vitals:   Temp (24hrs), Av 7 °F (36 5 °C), Min:97 6 °F (36 4 °C), Max:97 9 °F (36 6 °C)    Temp:  [97 6 °F (36 4 °C)-97 9 °F (36 6 °C)] 97 6 °F (36 4 °C)  HR:  [80-82] 82  Resp:  [18] 18  BP: (103-129)/(63-75) 103/63  SpO2:  [96 %-97 %] 97 %  Body mass index is 28 43 kg/m²  Input and Output Summary (last 24 hours): Intake/Output Summary (Last 24 hours) at 2022 0838  Last data filed at 2022 0601  Gross per 24 hour   Intake 480 ml   Output 800 ml   Net -320 ml       Physical Exam:   Physical Exam  Vitals and nursing note reviewed  Constitutional:       General: She is not in acute distress  Appearance: She is not ill-appearing or toxic-appearing  HENT:      Head: Normocephalic  Eyes:      Conjunctiva/sclera: Conjunctivae normal    Cardiovascular:      Rate and Rhythm: Normal rate and regular rhythm  Pulmonary:      Effort: Pulmonary effort is normal       Breath sounds: Normal breath sounds  Abdominal:      General: Bowel sounds are normal       Palpations: Abdomen is soft  Musculoskeletal:         General: Deformity present  Skin:     Comments: Splint LLE   Neurological:      Mental Status: She is alert  Mental status is at baseline  Psychiatric:         Mood and Affect: Mood normal           Additional Data:     Labs:  Results from last 7 days   Lab Units 05/02/22  0547 04/29/22  0428 04/29/22  0428   WBC Thousand/uL 3 81*   < > 6 70   HEMOGLOBIN g/dL 12 0   < > 12 4   HEMATOCRIT % 36 9   < > 38 1   PLATELETS Thousands/uL 195   < > 203   NEUTROS PCT %  --   --  72   LYMPHS PCT %  --   --  19   MONOS PCT %  --   --  9   EOS PCT %  --   --  0    < > = values in this interval not displayed  Results from last 7 days   Lab Units 05/02/22  0547 04/29/22  0428 04/29/22  0031   SODIUM mmol/L 140   < > 141   POTASSIUM mmol/L 4 0   < > 3 5   CHLORIDE mmol/L 109*   < > 107   CO2 mmol/L 24   < > 26   BUN mg/dL 17   < > 18   CREATININE mg/dL 1 02   < > 1 01   ANION GAP mmol/L 7   < > 8   CALCIUM mg/dL 9 0   < > 9 4   ALBUMIN g/dL  --   --  4 1   TOTAL BILIRUBIN mg/dL  --   --  0 41   ALK PHOS U/L  --   --  81   ALT U/L  --   --  21   AST U/L  --   --  15   GLUCOSE RANDOM mg/dL 92   < > 101    < > = values in this interval not displayed                         Lines/Drains:  Invasive Devices  Report    Peripheral Intravenous Line            Peripheral IV 04/29/22 Left Antecubital 3 days                      Imaging: Reviewed radiology reports from this admission including: xray(s)    Recent Cultures (last 7 days):         Last 24 Hours Medication List:   Current Facility-Administered Medications   Medication Dose Route Frequency Provider Last Rate    acetaminophen  975 mg Oral Q6H PRN Hilarie Babinski, CRNP      aluminum-magnesium hydroxide-simethicone  30 mL Oral Q6H PRN Hilarie Babinski, CRNP      Brivaracetam  75 mg Oral BID Aimee Ambron, DO      calcium carbonate-vitamin D  1 tablet Oral Daily With Dinner Aimee Richardron, DO      enoxaparin  40 mg Subcutaneous Daily Hilarie Babinski, CRNP      HYDROmorphone  0 2 mg Intravenous Q6H PRN Hilarie Babinski, CRNP      lacosamide  100 mg Oral Daily Aimee Ambron, DO      lacosamide  200 mg Oral After [address], CRNP      lamoTRIgine  200 mg Oral BID Cristina Bradley, CRNP      ondansetron  4 mg Intravenous Q6H PRN Cristina Bradley, CRNP      oxyCODONE  2 5 mg Oral Q4H PRN Cristina Bradley, CRNP      oxyCODONE  5 mg Oral Q4H PRN Cristina Bradley, CRNP      pantoprazole  40 mg Oral Early Morning Cristina Bradley, CRNP      simethicone  80 mg Oral 4x Daily PRN Cristina Bradley, CRNP      zonisamide  100 mg Oral Daily Aimee Collins DO      zonisamide  200 mg Oral After [address] [facility], SHAQ          Today, Patient Was Seen By: Gus Cao PA-C    **Please Note: This note may have been constructed using a voice recognition system  **

## 2022-05-02 NOTE — CASE MANAGEMENT
Case Management Discharge Planning Note    Patient name Jesus Kimball  Location East 2 /E2 -* MRN [id number]  : 1960 Date 2022       Current Admission Date: 2022  Current Admission Diagnosis:Trimalleolar fracture of ankle, closed, left, initial encounter   Patient Active Problem List    Diagnosis Date Noted    Left knee pain 2022    Trimalleolar fracture of ankle, closed, left, initial encounter 2022    Cognitive changes 2018    Adjustment disorder with depressed mood 2018    GERD (gastroesophageal reflux disease) 2018    Localization-related focal epilepsy with complex partial seizures ([facility] [address] ) 2017    History of craniotomy 2016    Essential hypertension 2016      LOS (days): 2  Geometric Mean LOS (GMLOS) (days):   Days to GMLOS:     OBJECTIVE:  Risk of Unplanned Readmission Score: 7         Current admission status: Inpatient   Preferred Pharmacy:   [facility]  for [facility], [address] [place] [address]  Phone: [phone] Fax: [address],  [address]   Phone: [phone] Fax: [phone]    Primary Care Provider: No Wills DO    Primary Insurance: BLUE CROSS  Secondary Insurance:     DISCHARGE DETAILS:                                                                                                               [address] [place] [address] Number: Submitted SNF auth to [facility] via availity, pending ref# [id number] for [facility]: 22 for  [facility] NPI: [id number] Dr Ana Rosa Salazar: NPI: [id number]  Clinicals attached in availity

## 2022-05-02 NOTE — DISCHARGE SUMMARY
[facility]  Discharge- Tod Ungerting 1960, 62 y o  female MRN: [id number]  Unit/Bed#: E2 -48 Encounter: [id number]  Primary Care Provider: Adriana Grubbs DO   Date and time admitted to hospital: 4/28/2022  8:51 PM    * Trimalleolar fracture of ankle, closed, left, initial encounter  Assessment & Plan  · Presents status post fall down the stairs at home  · XR ankle: Fractures of the distal left fibula and tibia  Tiny avulsion fracture arising from the distal tip of the medial malleolus  Additionally, there appears to be fracture of the posterior malleolus on the lateral view  Soft tissue swelling surrounding the ankle; there appears to be a left ankle joint effusion  · NWB LLE in AO splint   · P r n  Analgesics - script in chart   · PT OT recommended str - to  [facility] today   · Appreciate ortho recommendation: S/p splint  follow up with ortho in 2 weeks for repeat xray and placement of camboot vs cast      Cognitive changes  Assessment & Plan  · Supportive care    GERD (gastroesophageal reflux disease)  Assessment & Plan  · Continue PPI    Essential hypertension  Assessment & Plan  · Elevated /86, 175/94  · Not maintained on antihypertensive medications  Likely pain induced  · bp currently stable off of meds for several days     Localization-related focal epilepsy with complex partial seizures [facility])  Assessment & Plan  Followed outpatient by Neurology, outpatient AEDs:   · Lacosamide 100 mg in the morning and 200 mg at night  · Briviact 75 mg twice a day  Medication delivered to pharmacy  She will be able to provide medication to str     · Zonisamide 100 mg in the morning and 200 mg at night  · Lamotrigine 200 mg twice a day       Medical Problems             Resolved Problems  Date Reviewed: 5/2/2022    None              Discharging Physician / Practitioner: Salma Lazaro PA-C  PCP: Adriana Grubbs DO  Admission Date:   Admission Orders (From admission, onward) Ordered        04/30/22 1053  Inpatient Admission  Once            04/29/22 0112  Place in Observation  Once                      Discharge Date: 05/03/22    Consultations During Hospital Stay:  · orthopedics     Procedures Performed:   · none    Significant Findings / Test Results:   · X-ray left ankle  FINDINGS:     There is a minimally displaced minimally impacted fracture of the distal metadiaphysis left fibula  There is a tiny avulsion fracture arising from the distal tip of the medial malleolus  Additionally, there appears to be fracture of the posterior   malleolus on the lateral view  The ankle mortise does not appear widened      Mild degenerative changes are present      No lytic or blastic osseous lesion      There is soft tissue swelling surrounding the ankle  There appears to be a left ankle joint effusion  No radiopaque foreign bodies are demonstrable      IMPRESSION:     Fractures of the distal left fibula and tibia, as described above  Please see discussion  · Xray left knee:   FINDINGS:     There is no acute fracture or dislocation      There is a moderate joint effusion      No significant degenerative changes      No lytic or blastic osseous lesion      Meniscal chondrocalcinosis is identified      IMPRESSION:     Moderate joint effusion without an acute fracture  Incidental Findings:   · none    Test Results Pending at Discharge (will require follow up): · Repeat x-rays left ankle in 2 weeks     Outpatient Tests Requested:  · Orthopedics in 2 weeks  · Primary care provider within 1-2 weeks for post hospital follow-up    Complications:      Reason for Admission:  Trimalleolar left ankle fracture    Hospital Course: Jackie Cruz is a 62 y o  female patient who originally presented to the hospital on 4/28/2022 due to fall down the stairs sustaining a left ankle fracture  Patient had x-ray left ankle revealing fracture of the distal left fibula and tibia    She was seen in consultation with Orthopedics recommending nonsurgical intervention  She should continue nonweightbearing left lower extremity in a splint  She will need to follow up with Dr Demarcus Burnham in 2 weeks for repeat x-rays and placed in a cam boot versus cast outpatient  Patient was seen by PT/OT recommending rehab  She will be discharged to  [facility] for [facility]hab  Please see above list of diagnoses and related plan for additional information  Condition at Discharge: stable    Discharge Day Visit / Exam:   Subjective:  Doing well no complaints or events overnight  Pain controlled  Vitals: Blood Pressure: 103/63 (05/02/22 0733)  Pulse: 82 (05/02/22 0733)  Temperature: 97 6 °F (36 4 °C) (05/02/22 0733)  Temp Source: Temporal (05/02/22 0733)  Respirations: 18 (05/02/22 0733)  Height: 5' 5" (165 1 cm) (04/29/22 0144)  Weight - Scale: 77 5 kg (170 lb 13 7 oz) (04/29/22 0144)  SpO2: 97 % (05/02/22 0733)  Exam:   Physical Exam  Vitals and nursing note reviewed  Constitutional:       General: She is not in acute distress  Appearance: She is not toxic-appearing  HENT:      Head: Normocephalic  Eyes:      Conjunctiva/sclera: Conjunctivae normal    Cardiovascular:      Rate and Rhythm: Normal rate and regular rhythm  Pulmonary:      Effort: Pulmonary effort is normal       Breath sounds: Normal breath sounds  Abdominal:      General: Bowel sounds are normal       Palpations: Abdomen is soft  Musculoskeletal:         General: Deformity (LLE) present  Skin:     Comments: Splint to LLE   Neurological:      Mental Status: She is alert  Mental status is at baseline  Psychiatric:         Mood and Affect: Mood normal           Discussion with Family: Patient declined call to   Discharge instructions/Information to patient and family:   See after visit summary for information provided to patient and family        Provisions for Follow-Up Care:  See after visit summary for information related to follow-up care and any pertinent home health orders  Disposition:   Other: [facility]    Planned Readmission:  none     Discharge Statement:  I spent 34 minutes discharging the patient  This time was spent on the day of discharge  I had direct contact with the patient on the day of discharge  Greater than 50% of the total time was spent examining patient, answering all patient questions, arranging and discussing plan of care with patient as well as directly providing post-discharge instructions  Additional time then spent on discharge activities  Discharge Medications:  See after visit summary for reconciled discharge medications provided to patient and/or family        **Please Note: This note may have been constructed using a voice recognition system**

## 2022-05-02 NOTE — PHYSICAL THERAPY NOTE
PHYSICAL THERAPY NOTE          Patient Name: Flako Fernando  KWDUF'N Date: 5/2/2022 05/02/22 4868   Note Type   Note Type Treatment   Pain Assessment   Pain Assessment Tool 0-10  (0 AT REST   )   Pain Score No Pain   Pain Location/Orientation Orientation: Left; Location: Leg   Hospital Pain Intervention(s) Repositioned; Ambulation/increased activity; Emotional support;Cold applied   Restrictions/Precautions   LLE Weight Bearing Per Order NWB   Other Precautions WBS;Pain; Fall Risk   General   Chart Reviewed Yes   Cognition   Overall Cognitive Status WFL   Arousal/Participation Alert   Attention Within functional limits   Orientation Level Oriented X4   Memory Within functional limits   Following Commands Follows multistep commands with increased time or repetition   Subjective   Subjective I HAVE NO PAIN AT REST  Bed Mobility   Sit to Supine 5  Supervision   Additional items Assist x 1;HOB elevated; Bedrails; Increased time required   Transfers   Sit to Stand 4  Minimal assistance   Additional items Assist x 1; Armrests; Increased time required;Verbal cues   Stand to Sit 4  Minimal assistance   Additional items Assist x 1; Armrests; Increased time required;Verbal cues   Stand pivot 4  Minimal assistance   Additional items Assist x 1; Increased time required;Verbal cues   Car transfer 4  Minimal assistance   Additional items Assist x 1; Increased time required;Verbal cues  (simulated with use of staxi chair   )   Additional Comments CUES FOR HAND PLACEMENT   Ambulation/Elevation   Gait pattern Short stride; Excessively slow   Gait Assistance 4  Minimal assist   Additional items Assist x 1;Verbal cues   Assistive Device Rolling walker   Distance 25'X1   Balance   Static Sitting Normal   Dynamic Sitting Good   Static Standing Fair   Dynamic Standing Poor +   Ambulatory Poor +   Activity Tolerance   Activity Tolerance Patient tolerated treatment well   Assessment   Prognosis Good   Problem List Decreased endurance; Impaired balance;Decreased mobility;Orthopedic restrictions;Pain   Assessment Pt seen for PT treatment session  Pt  Is requiring min assist x1 for transfers and ambulation due to impairments in balance, safety, and mobility  Pt  Requires verbal cues for hand placement  Min assist for steadying assist with dynamic standing balance activities while performing ADL's  Pt  Performs car transfers simulated with staxi chair with min assist for stand to sit and supervision for pivoting around to front of chair  Pt  With limited session due to transport coming to take pt for x- ray of l knee due to reports of increased L knee pain and swelling  Pt will benefit from continued inpt PT for mobility, transfer and endurance training and le there ex for strengthening  Recommend STR at d/c due to decreased care giver support as pt lives alone  In order to maximize functional outcomes, mobilty and independence for safe d/c to home as pt lives alone  Should pt decline STR and go home recommend Rw and w/c vs rollabout which pt reports she can borrow from a friend and tub bench seat  Would recommend hhpt if pt d/c to home  Goals   Patient Goals TO GET BACK TOWALKING      STG Expiration Date 05/06/22   PT Treatment Day 1   Plan   Treatment/Interventions Functional transfer training   PT Frequency 3-5x/wk   Recommendation   PT Discharge Recommendation Post acute rehabilitation services   AM-PAC Basic Mobility Inpatient   Turning in Bed Without Bedrails 4   Lying on Back to Sitting on Edge of Flat Bed 4   Moving Bed to Chair 3   Standing Up From Chair 3   Walk in Room 3   Climb 3-5 Stairs 2   Basic Mobility Inpatient Raw Score 19   Basic Mobility Standardized Score 42 48   Highest Level Of Mobility   JH-HLM Goal 6: Walk 10 steps or more   JH-HLM Highest Level of Mobility 7: Walk 25 feet or more   JH-HLM Goal Achieved Yes   Education   Education Provided Mobility training;Home exercise program;Assistive device   Patient Demonstrates acceptance/verbal understanding   End of Consult   Patient Position at End of Consult Bedside chair; All needs within reach        05/02/22 0973   Note Type   Note Type Treatment   Pain Assessment   Pain Assessment Tool 0-10  (0 AT REST   )   Pain Score No Pain   Pain Location/Orientation Orientation: Left; Location: Leg   Hospital Pain Intervention(s) Repositioned; Ambulation/increased activity; Emotional support;Cold applied   Restrictions/Precautions   LLE Weight Bearing Per Order NWB   Other Precautions WBS;Pain; Fall Risk   General   Chart Reviewed Yes   Cognition   Overall Cognitive Status WFL   Arousal/Participation Alert   Attention Within functional limits   Orientation Level Oriented X4   Memory Within functional limits   Following Commands Follows multistep commands with increased time or repetition   Subjective   Subjective I HAVE NO PAIN AT REST  Bed Mobility   Sit to Supine 5  Supervision   Additional items Assist x 1;HOB elevated; Bedrails; Increased time required   Transfers   Sit to Stand 4  Minimal assistance   Additional items Assist x 1; Armrests; Increased time required;Verbal cues   Stand to Sit 4  Minimal assistance   Additional items Assist x 1; Armrests; Increased time required;Verbal cues   Stand pivot 4  Minimal assistance   Additional items Assist x 1; Increased time required;Verbal cues   Car transfer 4  Minimal assistance   Additional items Assist x 1; Increased time required;Verbal cues  (simulated with use of staxi chair   )   Additional Comments CUES FOR HAND PLACEMENT   Ambulation/Elevation   Gait pattern Short stride; Excessively slow   Gait Assistance 4  Minimal assist   Additional items Assist x 1;Verbal cues   Assistive Device Rolling walker   Distance 25'X1   Balance   Static Sitting Normal   Dynamic Sitting Good   Static Standing Fair   Dynamic Standing Poor +   Ambulatory Poor +   Activity Tolerance   Activity Tolerance Patient tolerated treatment well   Assessment   Prognosis Good   Problem List Decreased endurance; Impaired balance;Decreased mobility;Orthopedic restrictions;Pain   Assessment Pt seen for PT treatment session  Pt  Is requiring min assist x1 for transfers and ambulation due to impairments in balance, safety, and mobility  Pt  Requires verbal cues for hand placement  Min assist for steadying assist with dynamic standing balance activities while performing ADL's  Pt  Performs car transfers simulated with staxi chair with min assist for stand to sit and supervision for pivoting around to front of chair  Pt  With limited session due to transport coming to take pt for x- ray of l knee due to reports of increased L knee pain and swelling  Pt will benefit from continued inpt PT for mobility, transfer and endurance training and le there ex for strengthening  Recommend STR at d/c due to decreased care giver support as pt lives alone  In order to maximize functional outcomes, mobilty and independence for safe d/c to home as pt lives alone  Should pt decline STR and go home recommend Rw and w/c vs rollabout which pt reports she can borrow from a friend and tub bench seat  Would recommend hhpt if pt d/c to home  Goals   Patient Goals TO GET BACK TOWALKING      [facility] Expiration Date 05/06/22   PT Treatment Day 1   Plan   Treatment/Interventions Functional transfer training   PT Frequency 3-5x/wk   Recommendation   PT Discharge Recommendation Post acute rehabilitation services   AM-PAC Basic Mobility Inpatient   Turning in Bed Without Bedrails 4   Lying on Back to Sitting on Edge of Flat Bed 4   Moving Bed to Chair 3   Standing Up From Chair 3   Walk in Room 3   Climb 3-5 Stairs 2   Basic Mobility Inpatient Raw Score 19   Basic Mobility Standardized Score 42 48   Highest Level Of Mobility   JH-HL Goal 6: Walk 10 steps or more   JH-HL Highest Level of Mobility 7: Walk 25 feet or more -HLM Goal Achieved Yes   Education   Education Provided Mobility training;Home exercise program;Assistive device   Patient Demonstrates acceptance/verbal understanding   End of Consult   Patient Position at End of Consult Bedside chair; All needs within reach   [place], [place]

## 2022-05-02 NOTE — OCCUPATIONAL THERAPY NOTE
Occupational Therapy Treatment Note:         05/02/22 1020   OT Last Visit   OT Visit Date 05/02/22   Note Type   Note Type Treatment   Restrictions/Precautions   Weight Bearing Precautions Per Order Yes   LLE Weight Bearing Per Order NWB   Braces or Orthoses   (LLE cast w/ortho glass )   Other Precautions Fall Risk;Pain;WBS   Pain Assessment   Pain Assessment Tool 0-10   Pain Score No Pain   Pain Location/Orientation Orientation: Left; Location: Leg   ADL   Where Assessed Supine, bed  (while long sitting )   Grooming Assistance 5  Supervision/Setup   Grooming Deficit Setup   UB Bathing Assistance 5  Supervision/Setup   UB Bathing Deficit Setup   LB Bathing Assistance 5  Supervision/Setup   LB Bathing Deficit Setup   LB Bathing Comments using hip shifting techs while long sitting  UB Dressing Assistance 5  Supervision/Setup   UB Dressing Deficit Setup   LB Dressing Assistance 5  Supervision/Setup   LB Dressing Deficit Setup; Requires assistive device for steadying;Steadying;Supervision/safety;Verbal cueing; Increased time to complete;Pull up over hips; Thread LLE into underwear; Thread RLE into underwear;Don/doff L sock; Don/doff R sock   LB Dressing Comments using hip shift techs  Toileting Assistance  4  Minimal Assistance   Toileting Deficit Setup;Steadying;Verbal cueing;Supervison/safety; Increased time to complete   Toileting Comments hands on A for clothing management instance  Functional Standing Tolerance   Time 5 mins  Activity dynamic stand balance activities  Transfers   Sit to Stand 4  Minimal assistance   Additional items Assist x 1; Armrests; Bedrails; Increased time required;Verbal cues   Stand to Sit 4  Minimal assistance   Additional items Assist x 1;Bedrails;Armrests; Increased time required;Verbal cues   Stand pivot 4  Minimal assistance   Additional items Assist x 1; Armrests; Bedrails; Increased time required;Verbal cues   Toilet transfer 4  Minimal assistance   Additional items Assist x 1;Bedrails;Armrests; Increased time required;Verbal cues; Commode   Additional Comments cues for tech provided  Functional Mobility   Functional Mobility 4  Minimal assistance   Additional Comments x1   Additional items Rolling walker   Toilet Transfers   Toilet Transfer From [place] walker   Toilet Transfer Type To and from   Toilet Transfer to Extra wide bedside commode   Toilet Transfer Technique Stand pivot; Ambulating   Toilet Transfers Verbal cues; Minimal assistance   Toilet Transfers Comments no LOB with activitiy  Cognition   Overall Cognitive Status WFL   Arousal/Participation Alert; Responsive; Cooperative   Attention Within functional limits   Orientation Level Oriented X4   Memory Within functional limits   Following Commands Follows multistep commands with increased time or repetition   Additional Activities   Additional Activities Other (Comment)  (reviewed current plan of care  )   Additional Activities Comments Pt reports having good understanding  Activity Tolerance   Activity Tolerance Patient tolerated treatment well   Medical Staff Made Aware reported all findings to nursing staff  Assessment   Assessment Pt was seen for skilled OT with focus on completion of bed mobility, LE dressing activities, functional transfers, self toileting and review of current plan of care  Pt was able to complete functional transfers this tx session with Min A x1 while maintaining NWB into affected LLE  See above levels of A required for all functional tasks  Pt was able to tolerate functional tasks instance without LOB for more than 5 mins with Min A x1 and RW  Pt will benefit from continued use of RW as needed to maintain functional balance with self care tasks and LE dressing  The patient's raw score on the AM-PAC Daily Activity inpatient short form is 21, standardized score is 44 27, less than 39 4  Patients at this level are likely to benefit from discharge to home with home therapies      Plan Treatment Interventions ADL retraining;Functional transfer training; Endurance training;UE strengthening/ROM; Patient/family training   Goal Expiration Date 05/10/22   OT Treatment Day 1   OT Frequency 3-5x/wk   Recommendation   OT Discharge Recommendation Post acute rehabilitation services   Equipment Recommended Bedside commode  (20" w/c with elevating leg rests, RW  )   AM-PAC Daily Activity Inpatient   Lower Body Dressing 3   Bathing 3   Toileting 3   Upper Body Dressing 4   Grooming 4   Eating 4   Daily Activity Raw Score 21   Daily Activity Standardized Score (Calc for Raw Score >=11) 44 27   AM-PAC Applied Cognition Inpatient   Following a Speech/Presentation 4   Understanding Ordinary Conversation 4   Taking Medications 4   Remembering Where Things Are Placed or Put Away 4   Remembering List of 4-5 Errands 4   Taking Care of Complicated Tasks 4   Applied Cognition Raw Score 24   Applied Cognition Standardized Score 62 21   Jana Brito, [address]

## 2022-05-03 VITALS
OXYGEN SATURATION: 98 % | RESPIRATION RATE: 16 BRPM | SYSTOLIC BLOOD PRESSURE: 115 MMHG | BODY MASS INDEX: 28.47 KG/M2 | HEART RATE: 76 BPM | TEMPERATURE: 97.5 F | WEIGHT: 170.86 LBS | DIASTOLIC BLOOD PRESSURE: 64 MMHG | HEIGHT: 65 IN

## 2022-05-03 PROCEDURE — 97530 THERAPEUTIC ACTIVITIES: CPT

## 2022-05-03 PROCEDURE — 97110 THERAPEUTIC EXERCISES: CPT

## 2022-05-03 PROCEDURE — 97116 GAIT TRAINING THERAPY: CPT

## 2022-05-03 PROCEDURE — 99239 HOSP IP/OBS DSCHRG MGMT >30: CPT | Performed by: PHYSICIAN ASSISTANT

## 2022-05-03 PROCEDURE — NC001 PR NO CHARGE: Performed by: PHYSICIAN ASSISTANT

## 2022-05-03 RX ORDER — POLYETHYLENE GLYCOL 3350 17 G/17G
17 POWDER, FOR SOLUTION ORAL DAILY
Status: DISCONTINUED | OUTPATIENT
Start: 2022-05-03 | End: 2022-05-03 | Stop reason: HOSPADM

## 2022-05-03 RX ADMIN — PANTOPRAZOLE SODIUM 40 MG: 40 TABLET, DELAYED RELEASE ORAL at 05:48

## 2022-05-03 RX ADMIN — BRIVARACETAM 75 MG: 75 TABLET, FILM COATED ORAL at 08:52

## 2022-05-03 RX ADMIN — LACOSAMIDE 100 MG: 50 TABLET, FILM COATED ORAL at 08:51

## 2022-05-03 RX ADMIN — ZONISAMIDE 100 MG: 100 CAPSULE ORAL at 08:52

## 2022-05-03 RX ADMIN — LAMOTRIGINE 200 MG: 100 TABLET ORAL at 08:51

## 2022-05-03 RX ADMIN — POLYETHYLENE GLYCOL 3350 17 G: 17 POWDER, FOR SOLUTION ORAL at 11:23

## 2022-05-03 NOTE — PLAN OF CARE
Problem: MOBILITY - ADULT  Goal: Maintain or return to baseline ADL function  Description: INTERVENTIONS:  -  Assess patient's ability to carry out ADLs; assess patient's baseline for ADL function and identify physical deficits which impact ability to perform ADLs (bathing, care of mouth/teeth, toileting, grooming, dressing, etc )  - Assess/evaluate cause of self-care deficits   - Assess range of motion  - Assess patient's mobility; develop plan if impaired  - Assess patient's need for assistive devices and provide as appropriate  - Encourage maximum independence but intervene and supervise when necessary  - Involve family in performance of ADLs  - Assess for home care needs following discharge   - Consider OT consult to assist with ADL evaluation and planning for discharge  - Provide patient education as appropriate  Outcome: Progressing  Goal: Maintains/Returns to pre admission functional level  Description: INTERVENTIONS:  - Perform BMAT or MOVE assessment daily    - Set and communicate daily mobility goal to care team and patient/family/caregiver  - Collaborate with rehabilitation services on mobility goals if consulted  - Perform Range of Motion 3 times a day  - Reposition patient every 2 hours    - Dangle patient 3 times a day  - Stand patient 3 times a day  - Ambulate patient 3 times a day  - Out of bed to chair 3 times a day   - Out of bed for meals 3 times a day  - Out of bed for toileting  - Record patient progress and toleration of activity level   Outcome: Progressing     Problem: Potential for Falls  Goal: Patient will remain free of falls  Description: INTERVENTIONS:  - Educate patient/family on patient safety including physical limitations  - Instruct patient to call for assistance with activity   - Consult OT/PT to assist with strengthening/mobility   - Keep Call bell within reach  - Keep bed low and locked with side rails adjusted as appropriate  - Keep care items and personal belongings within reach  - Initiate and maintain comfort rounds  - Make Fall Risk Sign visible to staff  - Offer Toileting every 2 Hours, in advance of need  - Initiate/Maintain bed alarm  - Apply yellow socks and bracelet for high fall risk patients  - Consider moving patient to room near nurses station  Outcome: Progressing     Problem: PAIN - ADULT  Goal: Verbalizes/displays adequate comfort level or baseline comfort level  Description: Interventions:  - Encourage patient to monitor pain and request assistance  - Assess pain using appropriate pain scale  - Administer analgesics based on type and severity of pain and evaluate response  - Implement non-pharmacological measures as appropriate and evaluate response  - Consider cultural and social influences on pain and pain management  - Notify physician/advanced practitioner if interventions unsuccessful or patient reports new pain  Outcome: Progressing     Problem: INFECTION - ADULT  Goal: Absence or prevention of progression during hospitalization  Description: INTERVENTIONS:  - Assess and monitor for signs and symptoms of infection  - Monitor lab/diagnostic results  - Monitor all insertion sites, i e  indwelling lines, tubes, and drains  - Monitor endotracheal if appropriate and nasal secretions for changes in amount and color  - [place] appropriate cooling/warming therapies per order  - Administer medications as ordered  - Instruct and encourage patient and family to use good hand hygiene technique  - Identify and instruct in appropriate isolation precautions for identified infection/condition  Outcome: Progressing  Goal: Absence of fever/infection during neutropenic period  Description: INTERVENTIONS:  - Monitor WBC    Outcome: Progressing     Problem: SAFETY ADULT  Goal: Maintain or return to baseline ADL function  Description: INTERVENTIONS:  -  Assess patient's ability to carry out ADLs; assess patient's baseline for ADL function and identify physical deficits which impact ability to perform ADLs (bathing, care of mouth/teeth, toileting, grooming, dressing, etc )  - Assess/evaluate cause of self-care deficits   - Assess range of motion  - Assess patient's mobility; develop plan if impaired  - Assess patient's need for assistive devices and provide as appropriate  - Encourage maximum independence but intervene and supervise when necessary  - Involve family in performance of ADLs  - Assess for home care needs following discharge   - Consider OT consult to assist with ADL evaluation and planning for discharge  - Provide patient education as appropriate  Outcome: Progressing  Goal: Maintains/Returns to pre admission functional level  Description: INTERVENTIONS:  - Perform BMAT or MOVE assessment daily    - Set and communicate daily mobility goal to care team and patient/family/caregiver  - Collaborate with rehabilitation services on mobility goals if consulted  - Perform Range of Motion 3 times a day  - Reposition patient every 2 hours    - Dangle patient 3 times a day  - Stand patient 3 times a day  - Ambulate patient 3 times a day  - Out of bed to chair 3 times a day   - Out of bed for meals 3 times a day  - Out of bed for toileting  - Record patient progress and toleration of activity level   Outcome: Progressing  Goal: Patient will remain free of falls  Description: INTERVENTIONS:  - Educate patient/family on patient safety including physical limitations  - Instruct patient to call for assistance with activity   - Consult OT/PT to assist with strengthening/mobility   - Keep Call bell within reach  - Keep bed low and locked with side rails adjusted as appropriate  - Keep care items and personal belongings within reach  - Initiate and maintain comfort rounds  - Make Fall Risk Sign visible to staff  - Offer Toileting every 2 Hours, in advance of need  - Initiate/Maintain Bed alarm  - Apply yellow socks and bracelet for high fall risk patients  - Consider moving patient to room near nurses station  Outcome: Progressing     Problem: DISCHARGE PLANNING  Goal: Discharge to home or other facility with appropriate resources  Description: INTERVENTIONS:  - Identify barriers to discharge w/patient and caregiver  - Arrange for needed discharge resources and transportation as appropriate  - Identify discharge learning needs (meds, wound care, etc )  - Arrange for interpretive services to assist at discharge as needed  - Refer to Case Management Department for coordinating discharge planning if the patient needs post-hospital services based on physician/advanced practitioner order or complex needs related to functional status, cognitive ability, or social support system  Outcome: Progressing     Problem: Knowledge Deficit  Goal: Patient/family/caregiver demonstrates understanding of disease process, treatment plan, medications, and discharge instructions  Description: Complete learning assessment and assess knowledge base    Interventions:  - Provide teaching at level of understanding  - Provide teaching via preferred learning methods  Outcome: Progressing     Problem: Prexisting or High Potential for Compromised Skin Integrity  Goal: Skin integrity is maintained or improved  Description: INTERVENTIONS:  - Identify patients at risk for skin breakdown  - Assess and monitor skin integrity  - Assess and monitor nutrition and hydration status  - Monitor labs   - Assess for incontinence   - Turn and reposition patient  - Assist with mobility/ambulation  - Relieve pressure over bony prominences  - Avoid friction and shearing  - Provide appropriate hygiene as needed including keeping skin clean and dry  - Evaluate need for skin moisturizer/barrier cream  - Collaborate with interdisciplinary team   - Patient/family teaching  - Consider wound care consult   Outcome: Progressing

## 2022-05-03 NOTE — PLAN OF CARE
Problem: PHYSICAL THERAPY ADULT  Goal: Performs mobility at highest level of function for planned discharge setting  See evaluation for individualized goals  Description: Treatment/Interventions: Functional transfer training,LE strengthening/ROM,Therapeutic exercise,Endurance training,Patient/family training,Equipment eval/education,Gait training  Equipment Recommended: Wheelchair,Walker       See flowsheet documentation for full assessment, interventions and recommendations  Outcome: Progressing  Note: Prognosis: Good  Problem List: Decreased strength,Decreased range of motion,Decreased endurance,Impaired balance,Decreased mobility,Orthopedic restrictions,Pain  Assessment: Pt seen for PT treatment session  Pt  Is requiring min assist x1 for transfers and ambulation due to impairments in balance, safety, and mobility  Pt  Requires verbal cues for hand placement  Poor carryover with safe and proper transfer techniques  Min assist for steadying assist with dynamic standing balance  Pt progressed with ambulation distances to 40' x2, one standing rest break required due to generalized fatigue  Pt 's gait is unsteady, gait deviations as noted in flowsheet  Verbal cues to slow down required  pt is mildly impulsive at times  Pt performs aa-arom exercises, pt tolerated fair- well  Rest breaks required due to muscle fatigue and discomfort  Pt will benefit from continued inpt PT for mobility, transfer and endurance training and le there ex for strengthening  Recommend STR at d/c due to decreased care giver support as pt lives alone, In order to maximize functional outcomes, mobilty and independence for safe d/c to home as pt lives alone and is at increased risk for falls given impairments as noted above  Barriers to Discharge: Decreased caregiver support,Inaccessible home environment        PT Discharge Recommendation: Post acute rehabilitation services          See flowsheet documentation for full assessment

## 2022-05-03 NOTE — CASE MANAGEMENT
Case Management Discharge Planning Note    Patient name Clarence Mcguire  Location [facility] /E2 -* MRN [id number]  : 1960 Date 5/3/2022       Current Admission Date: 2022  Current Admission Diagnosis:Trimalleolar fracture of ankle, closed, left, initial encounter   Patient Active Problem List    Diagnosis Date Noted    Left knee pain 2022    Trimalleolar fracture of ankle, closed, left, initial encounter 2022    Cognitive changes 2018    Adjustment disorder with depressed mood 2018    GERD (gastroesophageal reflux disease) 2018    Localization-related focal epilepsy with complex partial seizures ([facility] [address] ) 2017    History of craniotomy 2016    Essential hypertension 2016      LOS (days): 3  Geometric Mean LOS (GMLOS) (days):   Days to GMLOS:     OBJECTIVE:  Risk of Unplanned Readmission Score: 8      Current admission status: Inpatient   Primary Care Provider: Mayank Avila DO    Primary Insurance: BLUE CROSS  Secondary Insurance:     DISCHARGE DETAILS:    Discharge planning discussed with[de-identified] Patient  Freedom of Choice: Yes  Comments - Freedom of Choice: Pt has been accepted and approved to go to [facility]  She requested transportation and is aware of cost  CM has a wcv arranged for 3PM w  [facility]  Treatment Team Recommendation: Short Term Rehab  Discharge Destination Plan[de-identified] Short Term Rehab  Transport at Discharge : Wheelchair Irasema Boss and Unit #):  [place]  ETA of Transport (Date): 22  ETA of Transport (Time): [address] [facility] [address] Name, [facility] [place] : [facility]  Receiving Facility/Agency Phone Number: [phone]  Facility/Agency Fax Number: [phone]

## 2022-05-03 NOTE — PHYSICAL THERAPY NOTE
PHYSICAL THERAPY NOTE          Patient Name: Adam Ace  BMJMB'D Date: 5/3/2022     05/03/22 1330   Note Type   Note Type Treatment   Pain Assessment   Pain Assessment Tool 0-10   Pain Score 4   Pain Location/Orientation Orientation: Left; Location: Hip;Location: Knee   Hospital Pain Intervention(s) Repositioned;Cold applied; Ambulation/increased activity; Emotional support   Restrictions/Precautions   LLE Weight Bearing Per Order NWB   Other Precautions WBS; Fall Risk;Pain   General   Chart Reviewed Yes   Family/Caregiver Present No   Cognition   Overall Cognitive Status WFL   Subjective   Subjective i HAVE NOPQAIN IN MY FOOT i HAVE PAIN IN MY HIP AND KNEE  Bed Mobility   Additional Comments PT  OUT OF BED IN CAHIR UPON ARRIVALAND REMINED OUT OF BED POST PT SESSION  Transfers   Sit to Stand 5  Supervision  (CG- CLOSE SUPERVISION)   Additional items Assist x 1; Armrests; Increased time required;Verbal cues   Stand to Sit 4  Minimal assistance   Additional items Assist x 1; Increased time required;Verbal cues   Ambulation/Elevation   Gait pattern Poor UE support; Short stride; Excessively slow;Decreased foot clearance   Gait Assistance 4  Minimal assist   Additional items Assist x 1;Verbal cues   Assistive Device Rolling walker   Distance 40' X2   Balance   Static Sitting Normal   Dynamic Sitting Good   Static Standing Fair  (W  RW)   Dynamic Standing Fair -  (W RW)   Ambulatory Fair -  (W RW)   Activity Tolerance   Activity Tolerance Patient tolerated treatment well   Exercises   Quad Sets Supine;10 reps;AROM; Bilateral   Heelslides Supine;10 reps;AROM; Left   Glute Sets Sitting;10 reps;AROM; Bilateral   Hip Flexion Supine;10 reps;AROM; Left   Hip Abduction Supine;10 reps;AAROM; Left   Hip Adduction Supine;10 reps;AAROM; Left   Knee AROM Short Arc Quad Supine;10 reps;AROM; Bilateral   Knee AROM Long Arc Quad Sitting;10 reps;AROM; Left Ankle Pumps Supine;10 reps;AROM; Right   Assessment   Prognosis Good   Problem List Decreased strength;Decreased range of motion;Decreased endurance; Impaired balance;Decreased mobility;Orthopedic restrictions;Pain   Assessment Pt seen for PT treatment session   Pt  Is requiring min assist x1 for transfers and ambulation due to impairments in balance, safety, and mobility   Pt  Requires verbal cues for hand placement  Poor carryover with safe and proper transfer techniques   Min assist for steadying assist with dynamic standing balance  Pt progressed with ambulation distances to 40' x2, one standing rest break required due to generalized fatigue  Pt 's gait is unsteady, gait deviations as noted in flowsheet  Verbal cues to slow down required  pt is mildly impulsive at times  Pt performs aa-arom exercises, pt tolerated fair- well  Rest breaks required due to muscle fatigue and discomfort  Pt will benefit from continued inpt PT for mobility, transfer and endurance training and le there ex for strengthening   Recommend STR at d/c due to decreased care giver support as pt lives alone, In order to maximize functional outcomes, mobilty and independence for safe d/c to home as pt lives alone and is at increased risk for falls given impairments as noted above  Goals   Patient Goals TO GET BACK TO WALKING  STG Expiration Date 05/06/22   PT Treatment Day 2   Plan   Treatment/Interventions Functional transfer training;LE strengthening/ROM; Therapeutic exercise; Endurance training;Patient/family training;Equipment eval/education;Gait training   Progress Slow progress, decreased activity tolerance   PT Frequency 3-5x/wk   Recommendation   PT Discharge Recommendation Post acute rehabilitation services   AM-PAC Basic Mobility Inpatient   Turning in Bed Without Bedrails 4   Lying on Back to Sitting on Edge of Flat Bed 4   Moving Bed to Chair 3   Standing Up From Chair 3   Walk in Room 3   Climb 3-5 Stairs 2   Basic Mobility Inpatient Raw Score 19   Basic Mobility Standardized Score 42 48   Highest Level Of Mobility   -[facility] Goal 6: Walk 10 steps or more   -[facility] Highest Level of Mobility 7: Walk 25 feet or more   -[facility] Goal Achieved Yes   Education   Education Provided Mobility training;Home exercise program;Assistive device   Patient Demonstrates acceptance/verbal understanding;Reinforcement needed   End of Consult   Patient Position at End of Consult Bedside chair; All needs within reach   End of Consult Comments ICE TO L KNEE       Robyn Neil, PTA

## 2022-05-03 NOTE — CASE MANAGEMENT
Case Management Discharge Planning Note    Patient name Margot Estrada  Location [facility] /E2 -* MRN [id number]  : 1960 Date 5/3/2022       Current Admission Date: 2022  Current Admission Diagnosis:Trimalleolar fracture of ankle, closed, left, initial encounter   Patient Active Problem List    Diagnosis Date Noted    Left knee pain 2022    Trimalleolar fracture of ankle, closed, left, initial encounter 2022    Cognitive changes 2018    Adjustment disorder with depressed mood 2018    GERD (gastroesophageal reflux disease) 2018    Localization-related focal epilepsy with complex partial seizures ([facility] [address] ) 2017    History of craniotomy 2016    Essential hypertension 2016      LOS (days): 3  Geometric Mean LOS (GMLOS) (days):   Days to GMLOS:     OBJECTIVE:  Risk of Unplanned Readmission Score: 8         Current admission status: Inpatient   Preferred Pharmacy:   [facility]  for [facility], [address] [place] [address]  Phone: [phone] Fax: [phone] - [facility], [address] [place] [address]  Phone: [phone] Fax: [phone]    Primary Care Provider: Oliver Hunt DO    Primary Insurance: [employer] [address]:     DISCHARGE DETAILS:                                                                                                               [address] [place] [address] Number: Approved SNF auth # [id number] for (different SNF per CM) [facility] NPI: [id number] Dr Sara Garcia NPI: [id number] for [facility]: 5/3/22 NRD: 5/10/22 at level 1 x7 days Care Coord:  Stephan Elias P# [phone] ext [phone] Fax clincal review to  [phone]

## 2022-05-03 NOTE — PROGRESS NOTES
[address]  Progress Note - Margarita Dunjaciel 1960, 62 y o  female MRN: [id number]  Unit/Bed#: E2 -01 Encounter: [id number]  Primary Care Provider: Cuba Messer DO   Date and time admitted to hospital: 4/28/2022  8:51 PM    * Trimalleolar fracture of ankle, closed, left, initial encounter  Assessment & Plan  · Presents status post fall down the stairs at home  · XR ankle: Fractures of the distal left fibula and tibia  Tiny avulsion fracture arising from the distal tip of the medial malleolus  Additionally, there appears to be fracture of the posterior malleolus on the lateral view  Soft tissue swelling surrounding the ankle; there appears to be a left ankle joint effusion  · NWB LLE in AO splint   · P r n  Analgesics  · PT OT recommended str  · Working on placement and auth   · COVID swab negative   · Scripts in chart   · Appreciate ortho recommendation: S/p splint  follow up with ortho in 2 weeks for repeat xray and placement of camboot vs cast      Left knee pain  Assessment & Plan  Xray: Moderate joint effusion without an acute fracture  Supportive care    Cognitive changes  Assessment & Plan  · Supportive care    GERD (gastroesophageal reflux disease)  Assessment & Plan  · Continue PPI    Essential hypertension  Assessment & Plan  · Elevated /86, 175/94  · Not maintained on antihypertensive medications  Likely pain induced  · bp currently stable off of meds    Localization-related focal epilepsy with complex partial seizures [facility])  Assessment & Plan  Followed outpatient by Neurology, outpatient AEDs:   · Lacosamide 100 mg in the morning and 200 mg at night  · Briviact 75 mg twice a day  Medication delivered to pharmacy  She will be able to provide medication to str     · She has one pill left for this morning, if she is not discharged by this afternoon she will have a friend bring in more pills   · Zonisamide 100 mg in the morning and 200 mg at night  · Lamotrigine 200 mg twice a day         VTE Pharmacologic Prophylaxis: VTE Score: 7 High Risk (Score >/= 5) - Pharmacological DVT Prophylaxis Ordered: enoxaparin (Lovenox)  Sequential Compression Devices Ordered  Patient Centered Rounds: I performed bedside rounds with nursing staff today  Discussions with Specialists or Other Care Team Provider: PORTER    Education and Discussions with Family / Patient: Patient declined call to   Time Spent for Care: 20 minutes  More than 50% of total time spent on counseling and coordination of care as described above  Current Length of Stay: 3 day(s)  Current Patient Status: Inpatient   Certification Statement: The patient will continue to require additional inpatient hospital stay due to ankle fracture awaiting rehab   Discharge Plan: once rehab established she is medically clear    Code Status: Level 1 - Full Code    Subjective:   Patient doing well  No acute complaints  No events overnight  Objective:     Vitals:   Temp (24hrs), Av 7 °F (36 5 °C), Min:97 5 °F (36 4 °C), Max:98 1 °F (36 7 °C)    Temp:  [97 5 °F (36 4 °C)-98 1 °F (36 7 °C)] 97 5 °F (36 4 °C)  HR:  [69-76] 76  Resp:  [16-18] 16  BP: (104-115)/(49-64) 115/64  SpO2:  [97 %-98 %] 98 %  Body mass index is 28 43 kg/m²  Input and Output Summary (last 24 hours): Intake/Output Summary (Last 24 hours) at 5/3/2022 0830  Last data filed at [address]  Gross per 24 hour   Intake 240 ml   Output --   Net 240 ml       Physical Exam:   Physical Exam  Vitals and nursing note reviewed  Constitutional:       Appearance: She is not toxic-appearing  HENT:      Head: Normocephalic  Eyes:      Conjunctiva/sclera: Conjunctivae normal    Cardiovascular:      Rate and Rhythm: Normal rate and regular rhythm  Pulmonary:      Effort: Pulmonary effort is normal  No respiratory distress  Breath sounds: Normal breath sounds  No wheezing     Abdominal:      General: Bowel sounds are normal       Palpations: Abdomen is soft  Musculoskeletal:         General: Deformity (LLE) present  Skin:     Comments: Splint over LLE   Neurological:      Mental Status: She is alert  Mental status is at baseline  Psychiatric:         Mood and Affect: Mood normal           Additional Data:     Labs:  Results from last 7 days   Lab Units 05/02/22 0547 04/29/22 0428 04/29/22 0428   WBC Thousand/uL 3 81*   < > 6 70   HEMOGLOBIN g/dL 12 0   < > 12 4   HEMATOCRIT % 36 9   < > 38 1   PLATELETS Thousands/uL 195   < > 203   NEUTROS PCT %  --   --  72   LYMPHS PCT %  --   --  19   MONOS PCT %  --   --  9   EOS PCT %  --   --  0    < > = values in this interval not displayed  Results from last 7 days   Lab Units 05/02/22  0547 04/29/22 0428 04/29/22  0031   SODIUM mmol/L 140   < > 141   POTASSIUM mmol/L 4 0   < > 3 5   CHLORIDE mmol/L 109*   < > 107   CO2 mmol/L 24   < > 26   BUN mg/dL 17   < > 18   CREATININE mg/dL 1 02   < > 1 01   ANION GAP mmol/L 7   < > 8   CALCIUM mg/dL 9 0   < > 9 4   ALBUMIN g/dL  --   --  4 1   TOTAL BILIRUBIN mg/dL  --   --  0 41   ALK PHOS U/L  --   --  81   ALT U/L  --   --  21   AST U/L  --   --  15   GLUCOSE RANDOM mg/dL 92   < > 101    < > = values in this interval not displayed                         Lines/Drains:  Invasive Devices  Report    Peripheral Intravenous Line            Peripheral IV 04/29/22 Left Antecubital 4 days                      Imaging: Reviewed radiology reports from this admission including: xray(s)    Recent Cultures (last 7 days):         Last 24 Hours Medication List:   Current Facility-Administered Medications   Medication Dose Route Frequency Provider Last Rate    acetaminophen  975 mg Oral Q6H PRN SHAQ Purcell      aluminum-magnesium hydroxide-simethicone  30 mL Oral Q6H PRN SHAQ Purcell      Brivaracetam  75 mg Oral BID Aimee Collins DO      calcium carbonate-vitamin D  1 tablet Oral Daily With [employer], DO      enoxaparin  40 mg Subcutaneous Q24H [address] Hanny Camacho PA-C      HYDROmorphone  0 2 mg Intravenous Q6H PRN Maria Fernanda Quiet, CRNP      lacosamide  100 mg Oral Daily Aimee Ambron, DO      lacosamide  200 mg Oral After [employer], CRNP      lamoTRIgine  200 mg Oral BID Maria Fernanda Quiet, CRNP      ondansetron  4 mg Intravenous Q6H PRN Maria Fernanda Quiet, CRNP      oxyCODONE  2 5 mg Oral Q4H PRN Maria Fernanda Quiet, CRNP      oxyCODONE  5 mg Oral Q4H PRN Maria Fernanda Quiet, CRNP      pantoprazole  40 mg Oral Early Morning Maria Fernanda Quiet, CRNP      simethicone  80 mg Oral 4x Daily PRN Maria Fernanda Quiet, CRNP      zonisamide  100 mg Oral Daily Aimee Ambron, DO      zonisamide  200 mg Oral After [employer], CRNP          Today, Patient Was Seen By: Hang Butcher PA-C    **Please Note: This note may have been constructed using a voice recognition system  **

## 2022-05-04 ENCOUNTER — TELEPHONE (OUTPATIENT)
Dept: OBGYN CLINIC | Facility: HOSPITAL | Age: 62
End: 2022-05-04

## 2022-05-04 NOTE — TELEPHONE ENCOUNTER
LM on VM for return call  Patient needs 2 week follow up of her L ankle fx with Dr Keny Velazco  Please schedule when call returned

## 2022-05-04 NOTE — TELEPHONE ENCOUNTER
Patient needs an appointment with Dr Lemons Form on or about 2022, per triage nurse   I am unable to afford such an appointment   Please advise  Patient: Brandon Rice  : 1960  MRN: [id number]  Call back # [phone]  Reason for appointment: NP/LT FIB FX/SL XRAYS/CBC  Requested doctor/location: Dr Milton Winter "Talon" Would  Patient   [facility]  [email or web address]  [email or web address]  org    request sent to Es via email

## 2022-05-06 NOTE — TELEPHONE ENCOUNTER
I consulted with triage nurse who advised appt on 05/11 with shante chan is appropriate    Patient advised she needs a letter stating she is to remain out of work until her office visit on 05/11   Patient states this letter "must come from ortho" and not pcp    Patient is not happy with the care she is receiving at [facility] and expressed a lot of dissatisfaction during my phone call with her

## 2022-05-06 NOTE — TELEPHONE ENCOUNTER
She was seen initially as a consultation at [facility] by Dr Marc Blair  Work note was provided

## 2022-05-11 ENCOUNTER — APPOINTMENT (OUTPATIENT)
Dept: RADIOLOGY | Facility: MEDICAL CENTER | Age: 62
End: 2022-05-11
Payer: COMMERCIAL

## 2022-05-11 ENCOUNTER — OFFICE VISIT (OUTPATIENT)
Dept: OBGYN CLINIC | Facility: MEDICAL CENTER | Age: 62
End: 2022-05-11
Payer: COMMERCIAL

## 2022-05-11 VITALS
HEIGHT: 65 IN | DIASTOLIC BLOOD PRESSURE: 75 MMHG | BODY MASS INDEX: 28.43 KG/M2 | SYSTOLIC BLOOD PRESSURE: 124 MMHG | HEART RATE: 90 BPM

## 2022-05-11 DIAGNOSIS — S82.832A OTHER CLOSED FRACTURE OF DISTAL END OF LEFT FIBULA, INITIAL ENCOUNTER: Primary | ICD-10-CM

## 2022-05-11 DIAGNOSIS — S82.852A TRIMALLEOLAR FRACTURE OF ANKLE, CLOSED, LEFT, INITIAL ENCOUNTER: ICD-10-CM

## 2022-05-11 PROCEDURE — 73610 X-RAY EXAM OF ANKLE: CPT

## 2022-05-11 PROCEDURE — 99213 OFFICE O/P EST LOW 20 MIN: CPT | Performed by: PHYSICIAN ASSISTANT

## 2022-05-11 NOTE — PROGRESS NOTES
Patient Name:  Mir Curtis  MRN:  [id number]    Assessment & Plan     Left distal fibula fracture after mechanical fall 4/28/22  1  Stress radiographs performed in the office today reveals medial clear space widening suggestive of an unstable ankle fracture  Recommend evaluation by one of our foot and ankle specialists for definitive management  2  In the meantime continue nonweightbearing left lower extremity in short-leg splint  3  Continue Tylenol as needed for pain  4  Recommended strict elevation  5  Follow-up with Dr Annalee Mora 5/17/22 at 8:30 am at [facility]  Chief Complaint     Left ankle injury    History of the Present Illness     Mir Curtis is a 62 y o  female who reports to the office today for evaluation of her ankle  Patient sustained a mechanical fall on 4/28/22  After this occurred she noted significant pain in the left ankle  She reported to the emergency department where x-rays were performed revealing a fracture  She was seen in consultation by orthopedics while admitted to the hospital on 4/29/22  At that time conservative management was recommended with splint immobilization and nonweightbearing restrictions  Today she notes minimal pain  She has been elevating the left lower extremity and notes improvement in her swelling  She has been compliant with the nonweightbearing restrictions in the splint  Pain is worse with movement of the lower extremity  No numbness or tingling  No fevers or chills  She has been taking Tylenol with improvement  Physical Exam     /75   Pulse 90   Ht 5' 5" (1 651 m)   BMI 28 43 kg/m²     Left ankle:  Skin intact  No gross deformity  Soft tissue swelling and ecchymosis are present  Tenderness to palpation distal fibula  No tenderness to palpation medial malleolus  Ankle range of motion is intact and limited  Toes warm and mobile  2+ DP pulse  Eyes: Anicteric sclerae  ENT: Trachea midline    Lungs: Normal respiratory effort  CV: Capillary refill is less than 2 seconds  Skin: Intact without erythema  Lymph: No palpable lymphadenopathy  Neuro: Sensation is grossly intact to light touch  Psych: Mood and affect are appropriate  Data Review     I have personally reviewed pertinent films in PACS, and my interpretation follows:    X-rays left ankle 5/11/22:  Transverse distal fibula fracture with evidence of displacement and medial clear space widening to 0 68cm on stress radiograph  Past Medical History:   Diagnosis Date    Hypertension     Seizure ([address] )     Thyroid disease        Past Surgical History:   Procedure Laterality Date    BRAIN SURGERY      HYSTERECTOMY      OVARIAN CYST SURGERY      WISDOM TOOTH EXTRACTION         No Known Allergies    Current Outpatient Medications on File Prior to Visit   Medication Sig Dispense Refill    Brivaracetam (Briviact) 75 MG TABS TAKE 1 TABLET BY MOUTH TWICE DAILY 14 tablet 0    Calcium Carbonate-Vitamin D 600-200 MG-UNIT TABS Take 1 tablet by mouth 2 (two) times a day      lacosamide (Vimpat) 100 mg tablet Take 1 tablet (100 mg total) by mouth every morning AND 2 tablets (200 mg total) every evening  270 tablet 1    lamoTRIgine (LaMICtal) 200 MG tablet TAKE 1 TABLET TWICE A  tablet 3    oxyCODONE (ROXICODONE) 5 immediate release tablet Take 0 5 tablets (2 5 mg total) by mouth every 4 (four) hours as needed for moderate pain for up to 10 days Max Daily Amount: 15 mg 20 tablet 0    pantoprazole (PROTONIX) 40 mg tablet Take 40 mg by mouth daily      zonisamide (ZONEGRAN) 100 mg capsule Taking 100mg in am and 200mg at night 270 capsule 3     No current facility-administered medications on file prior to visit         Social History     Tobacco Use    Smoking status: Former Smoker    Smokeless tobacco: Never Used   Vaping Use    Vaping Use: Never used   Substance Use Topics    Alcohol use: Never    Drug use: No       Family History   Problem Relation Age of Onset    Seizures Mother     Stroke Mother     Thyroid disease Mother     Heart disease Father        Review of Systems     As stated in the HPI  All other systems reviewed and are negative

## 2022-05-11 NOTE — LETTER
May 11, 2022     Patient: Helene Lopez  YOB: 1960  Date of Visit: 5/11/2022      To Whom it May Concern: Jaxson Noy is under my professional care  Chris Bonilla was seen in my office on 5/11/2022  She remains out of work until her next evaluation on 5/17/22  If you have any questions or concerns, please don't hesitate to call           Sincerely,          Michelle Carmichael PA-C

## 2022-05-17 ENCOUNTER — OFFICE VISIT (OUTPATIENT)
Dept: OBGYN CLINIC | Facility: CLINIC | Age: 62
End: 2022-05-17
Payer: COMMERCIAL

## 2022-05-17 VITALS
HEIGHT: 65 IN | HEART RATE: 80 BPM | SYSTOLIC BLOOD PRESSURE: 100 MMHG | DIASTOLIC BLOOD PRESSURE: 67 MMHG | BODY MASS INDEX: 28.32 KG/M2 | WEIGHT: 170 LBS

## 2022-05-17 DIAGNOSIS — S82.832A OTHER CLOSED FRACTURE OF DISTAL END OF LEFT FIBULA, INITIAL ENCOUNTER: Primary | ICD-10-CM

## 2022-05-17 DIAGNOSIS — Z01.818 PRE-OP EVALUATION: ICD-10-CM

## 2022-05-17 PROCEDURE — 99213 OFFICE O/P EST LOW 20 MIN: CPT | Performed by: ORTHOPAEDIC SURGERY

## 2022-05-17 RX ORDER — CEFAZOLIN SODIUM 1 G/50ML
1000 SOLUTION INTRAVENOUS ONCE
Status: CANCELLED | OUTPATIENT
Start: 2022-05-23 | End: 2022-05-17

## 2022-05-17 RX ORDER — CHLORHEXIDINE GLUCONATE 4 G/100ML
SOLUTION TOPICAL DAILY PRN
Status: CANCELLED | OUTPATIENT
Start: 2022-05-17

## 2022-05-17 RX ORDER — CHLORHEXIDINE GLUCONATE 0.12 MG/ML
15 RINSE ORAL ONCE
Status: CANCELLED | OUTPATIENT
Start: 2022-05-17 | End: 2022-05-17

## 2022-05-17 RX ORDER — CHOLECALCIFEROL (VITAMIN D3) 25 MCG
TABLET,CHEWABLE ORAL DAILY
COMMUNITY
Start: 2022-05-11

## 2022-05-17 NOTE — PROGRESS NOTES
DONNIE Stoddard  Attending, Orthopaedic Surgery  Foot and Ankle  [facility]        ORTHOPAEDIC FOOT AND ANKLE CLINIC VISIT-ANKLE FRACTURE     Assessment:     Encounter Diagnosis   Name Primary?  Other closed fracture of distal end of left fibula, initial encounter Yes              Plan:   · The patient verbalized understanding of exam findings and treatment plan  We engaged in the shared decision-making process and treatment options were discussed at length with the patient  Surgical and conservative management discussed today along with risks and benefits  · The patient has an unstable ankle fracture which is amenable to surgical fixation  · Consent signed in clinic today  · We will plan for surgery at the earliest mutually convenient time  · See back 3 weeks postop for suture removal and transition from splint to CAM boot    CONSENT FOR ANKLE FRACTURE OPEN REDUCTION INTERNAL FIXATION (ORIF): We have discussed the procedure with the patient in detail, including fixation of the fibula with a plate and screws as well as possible need for deltoid ligament repair and/or syndesmotic screw fixation  Potential syndesmotic screw breakage was explained as an anticipated sequela as well  Patient understands that there is no guarantee that the surgery will relieve all of their pain and also understands that there may be a prolonged course of protected weight-bearing status required which will restrict them from driving and other activities as discussed at today's visit  Patient recognizes that there are risks with surgery including bleeding, numbness, nerve irritation, wound complications, infection, continued pain, joint stiffness, malunion, nonunion, anesthetic complications, death, failure of procedure, development of arthritis and possible need for further surgery  The patient understands that there is no guarantee that this surgery will relieve all of Her pain and symptoms  Patient understands that there is no guarantee that they will return to full function after the procedure  Patient has provided informed consent for the procedure  History of Present Illness:   Chief Complaint:   Chief Complaint   Patient presents with    Left Ankle - Pain, Fracture     Alla Medina is a 62 y o  female who is being seen for  left ankle fracture  Fracture was sustained 3 weeks ago  Pain is localized at fracture site with minimal radiating and described as sharp and severe  Patient denies numbness, tingling or radicular pain  Denies history of neuropathy  Patient does not smoke, does not have diabetes and does not take blood thinners  Patient denies family history of anesthesia complications and has not had any complications with anesthesia  Pain/symptom timing:  Worse during the day when active  Pain/symptom context:  Worse with activites and work  Pain/symptom modifying factors:  Rest makes better, activities make worse  Pain/symptom associated signs/symptoms: none    Prior treatment   · NSAIDsYes    · Injections No   · Bracing/Orthotics Yes   · Physical Therapy Yes     Orthopedic Surgical History:   See below    Past Medical, Surgical and Social History:  Past Medical History:  has a past medical history of Hypertension, Seizure ([facility] [address] ), and Thyroid disease  Problem List: does not have any pertinent problems on file  Past Surgical History:  has a past surgical history that includes Brain surgery; Ovarian cyst surgery; Hysterectomy; and [place] tooth extraction  Family History: family history includes Heart disease in her father; Seizures in her mother; Stroke in her mother; Thyroid disease in her mother  Social History:  reports that she has quit smoking  She has never used smokeless tobacco  She reports that she does not drink alcohol and does not use drugs    Current Medications: has a current medication list which includes the following prescription(s): briviact, calcium carbonate-vitamin d, cholecalciferol, b-12, lacosamide, lamotrigine, pantoprazole, and zonisamide  Allergies: has No Known Allergies  Review of Systems:  General- denies fever/chills  HEENT- denies hearing loss or sore throat  Eyes- denies eye pain or visual disturbances, denies red eyes  Respiratory- denies cough or SOB  Cardio- denies chest pain or palpitations  GI- denies abdominal pain  Endocrine- denies urinary frequency  Urinary- denies pain with urination  Musculoskeletal- Negative except noted above  Skin- denies rashes or wounds  Neurological- denies dizziness or headache  Psychiatric- denies anxiety or difficulty concentrating    Physical Exam:   Ht 5' 5" (1 651 m)   Wt 77 1 kg (170 lb)   BMI 28 29 kg/m²   General/Constitutional: No apparent distress: well-nourished and well developed  Eyes: normal ocular motion  Cardio: RRR, Normal S1S2, No m/r/g  Lymphatic: No appreciable lymphadenopathy  Respiratory: Non-labored breathing, CTA b/l no w/c/r  Vascular: No edema, swelling or tenderness, except as noted in detailed exam   Integumentary: No impressive skin lesions present, except as noted in detailed exam   Neuro: No ataxia or tremors noted  Psych: Normal mood and affect, oriented to person, place and time  Appropriate affect  Musculoskeletal: Normal, except as noted in detailed exam and in HPI  Examination    left    Gait Not assessed due to unstable ankle fracture   Musculoskeletal Tender to palpation at fracture site    Skin What can be seen in the splint is Normal       Nails Normal    Range of Motion  Not assessed due to unstable ankle fracture    Stability Unstable    Muscle Strength N/A tibialis anterior  N/A gastrocnemius-soleus  N/A posterior tibialis  N/A peroneal/eversion strength  5/5 EHL  5/5 FHL    Neurologic Normal    Sensation  Intact to light touch throughout sural, saphenous, superficial peroneal, deep peroneal and medial/lateral plantar nerve distributions    [place] 07 filament (10g) testing deferred  Cardiovascular Brisk capillary refill < 2 seconds,intact DP and PT pulses    Special Tests None      Imaging Studies:   3 views of the  left ankle were taken, reviewed and interpreted independently that demonstrate unstable lateral malleolus fracture  Reviewed by me personally  Margene Maroon Lachman, MD  Foot & Ankle Surgery   Department of [address] [place] [address]      I personally performed the service  Margene Maroon Lachman, MD

## 2022-05-17 NOTE — H&P (VIEW-ONLY)
DONNIE Chand  Attending, Orthopaedic Surgery  Foot and Ankle  [address] [facility]        ORTHOPAEDIC FOOT AND ANKLE CLINIC VISIT-ANKLE FRACTURE     Assessment:     Encounter Diagnosis   Name Primary?  Other closed fracture of distal end of left fibula, initial encounter Yes              Plan:   · The patient verbalized understanding of exam findings and treatment plan  We engaged in the shared decision-making process and treatment options were discussed at length with the patient  Surgical and conservative management discussed today along with risks and benefits  · The patient has an unstable ankle fracture which is amenable to surgical fixation  · Consent signed in clinic today  · We will plan for surgery at the earliest mutually convenient time  · See back 3 weeks postop for suture removal and transition from splint to CAM boot    CONSENT FOR ANKLE FRACTURE OPEN REDUCTION INTERNAL FIXATION (ORIF): We have discussed the procedure with the patient in detail, including fixation of the fibula with a plate and screws as well as possible need for deltoid ligament repair and/or syndesmotic screw fixation  Potential syndesmotic screw breakage was explained as an anticipated sequela as well  Patient understands that there is no guarantee that the surgery will relieve all of their pain and also understands that there may be a prolonged course of protected weight-bearing status required which will restrict them from driving and other activities as discussed at today's visit  Patient recognizes that there are risks with surgery including bleeding, numbness, nerve irritation, wound complications, infection, continued pain, joint stiffness, malunion, nonunion, anesthetic complications, death, failure of procedure, development of arthritis and possible need for further surgery  The patient understands that there is no guarantee that this surgery will relieve all of Her pain and symptoms  Patient understands that there is no guarantee that they will return to full function after the procedure  Patient has provided informed consent for the procedure  History of Present Illness:   Chief Complaint:   Chief Complaint   Patient presents with    Left Ankle - Pain, Fracture     Kristian Donis is a 62 y o  female who is being seen for  left ankle fracture  Fracture was sustained 3 weeks ago  Pain is localized at fracture site with minimal radiating and described as sharp and severe  Patient denies numbness, tingling or radicular pain  Denies history of neuropathy  Patient does not smoke, does not have diabetes and does not take blood thinners  Patient denies family history of anesthesia complications and has not had any complications with anesthesia  Pain/symptom timing:  Worse during the day when active  Pain/symptom context:  Worse with activites and work  Pain/symptom modifying factors:  Rest makes better, activities make worse  Pain/symptom associated signs/symptoms: none    Prior treatment   · NSAIDsYes    · Injections No   · Bracing/Orthotics Yes   · Physical Therapy Yes     Orthopedic Surgical History:   See below    Past Medical, Surgical and Social History:  Past Medical History:  has a past medical history of Hypertension, Seizure ([facility] [address] ), and Thyroid disease  Problem List: does not have any pertinent problems on file  Past Surgical History:  has a past surgical history that includes Brain surgery; Ovarian cyst surgery; Hysterectomy; and [place] tooth extraction  Family History: family history includes Heart disease in her father; Seizures in her mother; Stroke in her mother; Thyroid disease in her mother  Social History:  reports that she has quit smoking  She has never used smokeless tobacco  She reports that she does not drink alcohol and does not use drugs    Current Medications: has a current medication list which includes the following prescription(s): briviact, calcium carbonate-vitamin d, cholecalciferol, b-12, lacosamide, lamotrigine, pantoprazole, and zonisamide  Allergies: has No Known Allergies  Review of Systems:  General- denies fever/chills  HEENT- denies hearing loss or sore throat  Eyes- denies eye pain or visual disturbances, denies red eyes  Respiratory- denies cough or SOB  Cardio- denies chest pain or palpitations  GI- denies abdominal pain  Endocrine- denies urinary frequency  Urinary- denies pain with urination  Musculoskeletal- Negative except noted above  Skin- denies rashes or wounds  Neurological- denies dizziness or headache  Psychiatric- denies anxiety or difficulty concentrating    Physical Exam:   Ht 5' 5" (1 651 m)   Wt 77 1 kg (170 lb)   BMI 28 29 kg/m²   General/Constitutional: No apparent distress: well-nourished and well developed  Eyes: normal ocular motion  Cardio: RRR, Normal S1S2, No m/r/g  Lymphatic: No appreciable lymphadenopathy  Respiratory: Non-labored breathing, CTA b/l no w/c/r  Vascular: No edema, swelling or tenderness, except as noted in detailed exam   Integumentary: No impressive skin lesions present, except as noted in detailed exam   Neuro: No ataxia or tremors noted  Psych: Normal mood and affect, oriented to person, place and time  Appropriate affect  Musculoskeletal: Normal, except as noted in detailed exam and in HPI  Examination    left    Gait Not assessed due to unstable ankle fracture   Musculoskeletal Tender to palpation at fracture site    Skin What can be seen in the splint is Normal       Nails Normal    Range of Motion  Not assessed due to unstable ankle fracture    Stability Unstable    Muscle Strength N/A tibialis anterior  N/A gastrocnemius-soleus  N/A posterior tibialis  N/A peroneal/eversion strength  5/5 EHL  5/5 FHL    Neurologic Normal    Sensation  Intact to light touch throughout sural, saphenous, superficial peroneal, deep peroneal and medial/lateral plantar nerve distributions    [place] 5 07 filament (10g) testing deferred  Cardiovascular Brisk capillary refill < 2 seconds,intact DP and PT pulses    Special Tests None      Imaging Studies:   3 views of the  left ankle were taken, reviewed and interpreted independently that demonstrate unstable lateral malleolus fracture  Reviewed by me personally  Heron Nevin Lachman, MD  Foot & Ankle Surgery   Department of [address] [place] [address]      I personally performed the service  Heron Nevin Lachman, MD

## 2022-05-17 NOTE — PATIENT INSTRUCTIONS
DONNIE Mckeon  Attending, [address] [place] [address] Office Phone: [phone] ? Fax: [phone]  [address] [place] [address] Office Phone: [phone] ? EXC:[phone]    : Bautista Acosta) [place], [place]     Surgery Coordinators Maximiliano Lara: José Miguel Adame, [phone]  Sharonda Kaur, [phone]  Surgery Coordinator Tam:  Eamon Barbour, [address], [phone]  www [email or web address]   PRE-OPERATIVE AND POST-OPERATIVE INSTRUCTIONS    General Information:  Your surgery is with Dr Harmony Cagle  Dates can change (although rare) depending on emergencies  Typical post operative visits are at the following intervals:  3 weeks post surgery(except 1 week for bunions and wound monitoring), 6 weeks post surgery, 3 months post surgery, 6 months post surgery, and then on a yearly basis  However, this may change based on Dr Samuel Sher recommendation  #1 post-operative rule for foot/ankle surgery:  ONCE YOU ARE OUT OF YOUR CAST AND/OR REMOVABLE BOOT, SWELLING MAY PERSIST FOR MANY MONTHS  YOU MIGHT ALSO EXPERIENCE A BLUISH DISCOLORATION OF YOUR LEG  THIS IS NORMAL AND PART OF THE USUAL POSTOPERATIVE EXPERIENCE  SMOKING:  Smoking results in incomplete healing of fractures (broken bones) and joints that my have been fused  Smoking and nicotine also prevents the growth of bone into ankle replacements and bone healing  It also slows the healing of muscles and skin (soft tissue)  Therefore, please do not have surgery if you continue to smoke  We reserve the right to cancel your surgery if we suspect that you are smoking  DO NOT use nicorette gum or other patches  Please find an alternative method to quit smoking before your surgery  Pre-Operative Information:  Surgery date and preoperative visits:   If you have medical problems, such as an abnormal EKG, history of BLOOD CLOT, ANEURYSM, and any other heart condition, please inform us so that we can get your medical clearance several weeks before the surgery  Please bring any important medical information, such as an EKG, chest x-ray, or echocardiogram, with you to ensure that your surgery will not be delayed  If needed, you will receive your preoperative appointments in the mail or by phone from our scheduling office  The location of the preoperative appointment will be given to you also  You may not eat after midnight the night before surgery  If you do, your surgery will be cancelled  You will receive a phone call from your surgery center the day before your surgery (if your surgery is on a Monday, you will get a call the Friday before)  If you do not hear from someone by 4pm the day before your surgery, please call the Surgical coordinator (number above) to notify us  Start taking Vitamin D3 4000 units per day and Calcium 1200mg per day immediately  You will continue this until your 3 month post-op visit  These are over the counter and available at all pharmacies and supermarkets  FOR THOSE HAVING SURGERY AT [address] [place] [address] Avenue WILL NEED CRUTCHES OR A ROLLING WALKER AFTER SURGERY, ASK FOR A PRESCRIPTION FOR THIS FROM OUR OFFICE TODAY  THIS CANNOT BE HANDLED THE DAY OF SURGERY AS [facility] DOES NOT STOCK THESE  Because bacterial can often enter any defect in the skin, it is important to avoid any cuts before surgery  Any breaks in the skin on the leg will often result in your surgery being postponed  Please avoid going on a very long walk the day prior to surgery, or doing other activities that could lead to irritation of the skin, including yard work, extra athletic activity, or shaving  This could result in surgery cancellation  You MUST be fasting the day of your surgery  Therefore, please do not consume any foot or beverage after midnight the night before surgery  The morning of surgery you may take your usual medications with a sip of water  It is important not to take anti-inflammatory medication like Ibuprofen, Motrin, Naproxen (Aleve), or Aspirin 7-10 days before surgery because they will make you bleed more than usual   Vitamin, E, Plavix and Coumadin also have the same effect  Stop Aspirin and Vitamin E two weeks before surgery  YOUR MEDICAL DOCTOR SHOULD TELL YOU WHEN TO STOP COUMADIN OR PLAVIX  If your surgery involves any bone healing, please do not take anti-inflammatories for at least 6 weeks after surgery  This can impede bone healing (ibuprofen, Aleve, Relafen, iodine)  Tylenol is fine to take  PREOPERATIVE BATHING INSTRUCTIONS:    Before your surgery, bathe with Hibiclens (4% Chlorhexidene) as instructed below  This skin cleanser will help reduce the bacteria on your skin before surgery  To avoid irritating your eyes, do not apply Hibiclens above the level of your neck  On the evening before AND the morning of surgery, bathe your entire body except the face and scalp, then rinse freely  DO NOT apply to your face or scalp, as Hibiclens can irritate your eyes  Purchasing information:   Hibiclens is available without a prescription at [facility]  ADDITIONAL INSTRUCTIONS:  PATIENTS HAVING FOOT/ANKLE SURGERY     In preparation for your upcoming surgery, we kindly request and advise the following:  Notify our office if you are taking any of the following:  Coumadin (warfarin):  Persantine (dipyridamole); Pletal (cilostazol); Plavix (clopidogrel); Ticlid (ticlopidine); Agrylin (anagrelide); Aggrenox (dipyridamole and aspirin) or other blood thinners,  In addition, stop taking Vitamin E and herbal supplements  Do not schedule any elective dental work for at least 6 months after surgery  If you had an ankle replacement, you will need to take antibiotics before any future dental procedures   Your dentist or our office can prescribe these for you  1000mg of Amoxicillin 1 hour prior to any dental procedure is the recommended dosing  THREE RULES:    After surgery you will most likely be given the instructions KEEP YOUR TOES ABOVE YOUR NOSE    This means that you MUST have your feet elevated higher than your heart  Keeping your toes above your nose helps to heal the muscles and skin (soft tissues) by reducing swelling in your leg  This position also helps to prevent infection, and is very important in avoiding deep venous thrombosis (blood clots)  In order to keep the blood circulating in your legs and in order to avoid deep vein   thrombosis (blood clots), we ask patients to GET UP ONCE AN HOUR during the day  This means you should at least cross the room and come back  It does not mean you have to be up for long periods of time  In most cases we will not have people immediately put any weight on their operated part  This is important to prevent loosening of metal or other devices holding the bones together  It also prevents irritation of the soft tissues which can lead to prolonged healing  When we say get up once an hour, please walk, hop or move with an assisted device  This is important! Do not do any excessive walking during the first few days after surgery  Recovering from surgery is a full-time task for the patient  Postoperative care is important to avoid irritating the skin incision, which can lead to infection  Please do not plan activities or go out of town for several weeks after surgery  If you are unsure about your future activities, please schedule surgery only when you know it is acceptable for you  Scheduling surgery and then canceling the date, prevents other people from having surgery on that date as it takes time to line everything up effectively  If you cancel your surgery the week of your planned surgery, we reserve the right to cancel all future surgical procedures      THE DAY OF SURGERY:    Arrival to the hospital or outpatient surgical center on time is imperative  If you arrive late, then your surgery will be cancelled  You MUST have a family member/friend bring you, stay with you throughout the DURATION of your surgery, and drive you home  You MUST be fasting the day of your surgery  Therefore, do not consume any food or beverage after midnight the night before surgery  At your pre-operative visit with the anesthesia staff, or during your phone screen, a nurse will instruct you what medications you will need to take the day of surgery  MAKE SURE THAT THE PHARMACY LISTED IN THE ELECTRONIC MEDICAL RECORD (EPIC) IS YOUR PREFERRED PHARMACY  For example, if you are staying with family or a friend, and will not be near your preferred pharmacy, YOU MUST, tell the nurses checking you in the day of surgery so that this can be changed in the system  If your prescriptions are sent to a pharmacy, this cannot be changed  AFTER YOUR SURGERY:  Bleeding through the bandage almost always occurs  Do not let this alarm you  Simply add more gauze or a towel, call us, and come in for a dressing change  If you think it is excessive, contact us immediately or go to the local emergency room  Do not get the bandage wet  Showering is possible with plastic protectors  Be very careful, as the bathroom can be wet and slippery  If you do get your dressing wet, it should be changed immediately  Please contact us  ONCE YOUR ARE OUT OF YOUR CAST AND/OR REMOVABLE BOOT, SWELLING MAY PERSIST FOR MANY MONTHS  YOU MIGHT ALSO EXPERIENCE A BLUISH DISCOLORATION OF YOUR LEG  THIS IS NORMAL AND PART OF THE USUAL POSTOPERATIVE EXPERIENCE  WEARING COMPRESSION HOSE (ELASTIC STOCKINGS) CAN HELP AVOID SOME OF THIS SWELLING  DRESSING:   The purpose of the surgical dressing is to keep your wound and the surgical site protected from the environment    Most dressings contain splints, which help to hold your foot and ankle in a corrected position, and also allow the surgical site to heal properly  Dressings will remain in place and undisturbed until the first postop visit  If you have a drain in place, this will need to be removed in 1-3 days after surgery  The time for the drain to be pulled will be written on your discharge instruction sheet  CAST  INSTRUCTIONS:  You may or may not get a cast following surgery  If you do, pay close attention to the following:    After application of a splint or cast, it is very important to elevate your leg for 24 to 72 hours  The injured area should be elevated well above the heart  Remember Toes above your Nose  Rest and elevation greatly reduce pain and speed the healing process by minimizing early swelling  CALL YOUR DOCTORS OFFICE OR VISIT LOCATION EMERGENCY ROOM IF YOU HAVE ANY OF THE FOLLOWING:    Significant increased pain, which may be caused by swelling, and the feeling that the splint or cast is too tight  Numbness and tingling in your hand or foot, which may be caused by too much pressure on the nerves  Burning and stinging, which may be caused by too much pressure on the skin  Excessive swelling below the cast, which may mean the cast is slowing your blood circulation  Loss of active movement of toes, which request an urgent evaluation  Loss of capillary refill  Pinch the tip of toes and aleshia the skin  Release pressure and if the skin does not return pink then call the office immediately  DO NOT GET YOUR CAST WET  Bacteria thrive in moist dark areas  We do not want this  If your cast becomes wet, return to the office and we will apply another one  PAIN AFTER SURGERY:  Narcotic pain medication can and will depress your respiratory system if taken in excess  The goal of pain management with narcotics is to be comfortable not pain free  If you take enough narcotics to be pain free then you run the risk of stopping breathing    If this happens, call [phone] immediately! Pain in the heel is often  caused by pressure from the weight of your foot on the bed  Make sure your heel is suspended off the bed by keeping a pillow underneath your calf not your knee  Medications: You will be given narcotic pain medication  Do NOT drive while taking narcotic medications  Medications such as Darvocet, Percocet, Vicoden or Tylenol #3, also contain acetaminophen (Tylenol)  Do not take acetaminophen or Tylenol from home when taking theses medications  When you fill your prescription, you may ask the pharmacist if your pain medication has acetaminophen/Tylenol in it  It is okay to take Tylenol with Oxycontin/Oxycodone  Should you have pain after taking your prescription medication, ibuprophen (Motrin, Advil, and Alleve) is a common over the counter preparation and may often be taken with the prescription pain medication as long as you take them with food  These medications can irritate the stomach lining  Unless you are allergic to aspirin or currently taking a blood thinner, Dr Junior Haile patients are requested to take one 325 mg aspirin every 12 hours until you are back to walking normally after surgery (This can be up to 6 weeks)  Narcotic medications commonly cause nausea  Taking them with food will decrease this side effect  If you are having extreme nausea, please contact us for an alternative medication or for something that can be taken with this medication to decrease the nausea  Also, narcotic medications frequently cause constipation  An increase of fiber, fruits and vegetables in your diet may alleviate this problem, or if necessary, you may use an over-the-counter medication such as senekot, colace, or Fibercon for constipation problems  You should resume all medications you were taking prior to the surgery unless otherwise specified  Activity:   Because of your recent foot surgery, your activity level will decrease   You will need to elevate your foot ABOVE the level of your heart for a minimum of four days  The length of time necessary for the swelling to go down, and for your wounds to heal properly depends greatly on your efforts here  Elevation is extremely important to avoid compromising the blood supply to your foot  Remember when your foot is down it will swell, which will increase pain and slow healing  Wiggle your toes frequently if possible  If you go home with a regional block, (a type of anesthesia) the foot and leg will be numb  Think of ways to get into your house and around the house until the block wears off  Keep in mind that it may be a legal issue if you drive while in a cast or splint, especially when the splint is on the right foot  You may call the Department of [employer] to schedule a road test if you have adaptive equipment applied to your car  The amount of weight you are allowed to bear on your foot will be written on your discharge sheet filled out at the time of surgery  The following is an explanation of the possibilities:       [address] has the following policies when it comes to ELECTIVE surgery  No elective surgery requiring anesthesia until 7 weeks after a patient tested positive for COVID-19   No elective surgery requiring anesthesia until 3 months after a patient was hospitalized for COVID-19      Non-weight bearing: You are to put NO weight whatsoever on your foot  When using crutches or a walker, your foot should not touch the ground, except when you are standing  Then, it may rest on the ground  If you are to be non-weight bearing, and you are not compliant, you could compromise the surgery  Some of our patients have been requesting prescriptions for a roll-a-bout knee scooter  [employer] and other insurances have been denying these claims, and you may either have to rent one or pay out of pocket to purchase one    THIS SHOULD BE PURCHASED PRIOR TO THE SURGERY AND YOU SHOULD BRING IT WITH YOU THE DAY OF THE SURGERY TO AIDE IN GETTING FROM THE CAR INTO THE HOUSE AFTER SURGERY

## 2022-05-17 NOTE — LETTER
May 17, 2022     Patient: Ligia Zamudio  YOB: 1960  Date of Visit: 5/17/2022      To Whom it May Concern: Kingsley Else is under my professional care  Lasha Ba was seen in my office on 5/17/2022  Lasha Ba should be out of work or allowed to work from home starting today and for 6 weeks after surgery scheduled on 5/23   If you have any questions or concerns, please don't hesitate to call  Sincerely,          Neida Atkinson MD        CC: Kiya Prescott

## 2022-05-19 NOTE — PRE-PROCEDURE INSTRUCTIONS
Pre-Surgery Instructions:   Medication Instructions    Brivaracetam (Briviact) 75 MG TABS Take day of surgery   Calcium Carbonate-Vitamin D 600-200 MG-UNIT TABS Hold day of surgery   Cholecalciferol 50 MCG (2000 UT) CAPS Hold day of surgery   Cyanocobalamin (B-12) 1000 MCG CAPS Hold day of surgery   lacosamide (Vimpat) 100 mg tablet Take day of surgery   lamoTRIgine (LaMICtal) 200 MG tablet Take day of surgery   pantoprazole (PROTONIX) 40 mg tablet Take day of surgery   zonisamide (ZONEGRAN) 100 mg capsule Take day of surgery  You will receive a phone call from hospital for arrival time  Please call surgeons office if any changes in your condition  Wear easy on/off clothing; consider type of surgery;  Valuables, jewelry, piercing's please keep at home  **COVID-19  education/surgical guidelines  Updated covid    Visitation policy  Please: No contact lenses or eye make up, artificial eyelashes    Patient has walker/scooter/transport chair    Please secure transportation     Follow pre surgery showering or cleaning instructions as  Reviewed by nurse or surgeons office      Questions answered and concerns addressed

## 2022-05-23 ENCOUNTER — ANESTHESIA (OUTPATIENT)
Dept: PERIOP | Facility: HOSPITAL | Age: 62
End: 2022-05-23
Payer: COMMERCIAL

## 2022-05-23 ENCOUNTER — ANESTHESIA EVENT (OUTPATIENT)
Dept: PERIOP | Facility: HOSPITAL | Age: 62
End: 2022-05-23
Payer: COMMERCIAL

## 2022-05-23 ENCOUNTER — HOSPITAL ENCOUNTER (OUTPATIENT)
Facility: HOSPITAL | Age: 62
Setting detail: OUTPATIENT SURGERY
Discharge: HOME | End: 2022-05-23
Attending: ORTHOPAEDIC SURGERY | Admitting: ORTHOPAEDIC SURGERY
Payer: COMMERCIAL

## 2022-05-23 ENCOUNTER — APPOINTMENT (OUTPATIENT)
Dept: RADIOLOGY | Facility: HOSPITAL | Age: 62
End: 2022-05-23
Payer: COMMERCIAL

## 2022-05-23 VITALS
SYSTOLIC BLOOD PRESSURE: 105 MMHG | HEART RATE: 68 BPM | DIASTOLIC BLOOD PRESSURE: 60 MMHG | BODY MASS INDEX: 27.49 KG/M2 | TEMPERATURE: 98.1 F | HEIGHT: 65 IN | WEIGHT: 165 LBS | RESPIRATION RATE: 14 BRPM | OXYGEN SATURATION: 98 %

## 2022-05-23 DIAGNOSIS — S82.832A CLOSED FRACTURE OF DISTAL END OF LEFT FIBULA, UNSPECIFIED FRACTURE MORPHOLOGY, INITIAL ENCOUNTER: Primary | ICD-10-CM

## 2022-05-23 PROCEDURE — 27792 TREATMENT OF ANKLE FRACTURE: CPT | Performed by: PHYSICIAN ASSISTANT

## 2022-05-23 PROCEDURE — C1713 ANCHOR/SCREW BN/BN,TIS/BN: HCPCS | Performed by: ORTHOPAEDIC SURGERY

## 2022-05-23 PROCEDURE — 73610 X-RAY EXAM OF ANKLE: CPT

## 2022-05-23 PROCEDURE — 27792 TREATMENT OF ANKLE FRACTURE: CPT | Performed by: ORTHOPAEDIC SURGERY

## 2022-05-23 PROCEDURE — C9290 INJ, BUPIVACAINE LIPOSOME: HCPCS | Performed by: ANESTHESIOLOGY

## 2022-05-23 DEVICE — BONE SCREW
Type: IMPLANTABLE DEVICE | Site: ANKLE | Status: FUNCTIONAL
Brand: VARIAX

## 2022-05-23 DEVICE — LOCKING SCREW
Type: IMPLANTABLE DEVICE | Site: ANKLE | Status: FUNCTIONAL
Brand: VARIAX

## 2022-05-23 DEVICE — DISTAL LATERAL FIBULA PLATE, 4 HOLE
Type: IMPLANTABLE DEVICE | Site: ANKLE | Status: FUNCTIONAL
Brand: VARIAX

## 2022-05-23 RX ORDER — ONDANSETRON 2 MG/ML
4 INJECTION INTRAMUSCULAR; INTRAVENOUS ONCE AS NEEDED
Status: DISCONTINUED | OUTPATIENT
Start: 2022-05-23 | End: 2022-05-23 | Stop reason: HOSPADM

## 2022-05-23 RX ORDER — LIDOCAINE HYDROCHLORIDE 10 MG/ML
INJECTION, SOLUTION EPIDURAL; INFILTRATION; INTRACAUDAL; PERINEURAL AS NEEDED
Status: DISCONTINUED | OUTPATIENT
Start: 2022-05-23 | End: 2022-05-23

## 2022-05-23 RX ORDER — CHLORHEXIDINE GLUCONATE 4 G/100ML
SOLUTION TOPICAL DAILY PRN
Status: DISCONTINUED | OUTPATIENT
Start: 2022-05-23 | End: 2022-05-23 | Stop reason: HOSPADM

## 2022-05-23 RX ORDER — MEPERIDINE HYDROCHLORIDE 25 MG/ML
12.5 INJECTION INTRAMUSCULAR; INTRAVENOUS; SUBCUTANEOUS
Status: DISCONTINUED | OUTPATIENT
Start: 2022-05-23 | End: 2022-05-23 | Stop reason: HOSPADM

## 2022-05-23 RX ORDER — PROPOFOL 10 MG/ML
INJECTION, EMULSION INTRAVENOUS AS NEEDED
Status: DISCONTINUED | OUTPATIENT
Start: 2022-05-23 | End: 2022-05-23

## 2022-05-23 RX ORDER — SODIUM CHLORIDE, SODIUM LACTATE, POTASSIUM CHLORIDE, CALCIUM CHLORIDE 600; 310; 30; 20 MG/100ML; MG/100ML; MG/100ML; MG/100ML
INJECTION, SOLUTION INTRAVENOUS CONTINUOUS PRN
Status: DISCONTINUED | OUTPATIENT
Start: 2022-05-23 | End: 2022-05-23

## 2022-05-23 RX ORDER — VANCOMYCIN HYDROCHLORIDE 1 G/20ML
INJECTION, POWDER, LYOPHILIZED, FOR SOLUTION INTRAVENOUS AS NEEDED
Status: DISCONTINUED | OUTPATIENT
Start: 2022-05-23 | End: 2022-05-23 | Stop reason: HOSPADM

## 2022-05-23 RX ORDER — DEXAMETHASONE SODIUM PHOSPHATE 10 MG/ML
INJECTION, SOLUTION INTRAMUSCULAR; INTRAVENOUS AS NEEDED
Status: DISCONTINUED | OUTPATIENT
Start: 2022-05-23 | End: 2022-05-23

## 2022-05-23 RX ORDER — ONDANSETRON 4 MG/1
4 TABLET, FILM COATED ORAL EVERY 8 HOURS PRN
Qty: 30 TABLET | Refills: 0 | Status: SHIPPED | OUTPATIENT
Start: 2022-05-23

## 2022-05-23 RX ORDER — FENTANYL CITRATE/PF 50 MCG/ML
25 SYRINGE (ML) INJECTION
Status: DISCONTINUED | OUTPATIENT
Start: 2022-05-23 | End: 2022-05-23 | Stop reason: HOSPADM

## 2022-05-23 RX ORDER — MAGNESIUM HYDROXIDE 1200 MG/15ML
LIQUID ORAL AS NEEDED
Status: DISCONTINUED | OUTPATIENT
Start: 2022-05-23 | End: 2022-05-23 | Stop reason: HOSPADM

## 2022-05-23 RX ORDER — BUPIVACAINE HYDROCHLORIDE 5 MG/ML
INJECTION, SOLUTION PERINEURAL
Status: COMPLETED | OUTPATIENT
Start: 2022-05-23 | End: 2022-05-23

## 2022-05-23 RX ORDER — KETOROLAC TROMETHAMINE 30 MG/ML
INJECTION, SOLUTION INTRAMUSCULAR; INTRAVENOUS AS NEEDED
Status: DISCONTINUED | OUTPATIENT
Start: 2022-05-23 | End: 2022-05-23

## 2022-05-23 RX ORDER — OXYCODONE HYDROCHLORIDE 5 MG/1
5 TABLET ORAL EVERY 4 HOURS PRN
Qty: 30 TABLET | Refills: 0 | Status: SHIPPED | OUTPATIENT
Start: 2022-05-23 | End: 2022-05-28

## 2022-05-23 RX ORDER — ASPIRIN 325 MG
325 TABLET, DELAYED RELEASE (ENTERIC COATED) ORAL 2 TIMES DAILY
Qty: 84 TABLET | Refills: 0 | Status: SHIPPED | OUTPATIENT
Start: 2022-05-23 | End: 2022-07-04

## 2022-05-23 RX ORDER — ALBUTEROL SULFATE 2.5 MG/3ML
2.5 SOLUTION RESPIRATORY (INHALATION) ONCE AS NEEDED
Status: DISCONTINUED | OUTPATIENT
Start: 2022-05-23 | End: 2022-05-23 | Stop reason: HOSPADM

## 2022-05-23 RX ORDER — OXYCODONE HYDROCHLORIDE 5 MG/1
5 TABLET ORAL ONCE AS NEEDED
Status: DISCONTINUED | OUTPATIENT
Start: 2022-05-23 | End: 2022-05-23 | Stop reason: HOSPADM

## 2022-05-23 RX ORDER — FENTANYL CITRATE 50 UG/ML
INJECTION, SOLUTION INTRAMUSCULAR; INTRAVENOUS AS NEEDED
Status: DISCONTINUED | OUTPATIENT
Start: 2022-05-23 | End: 2022-05-23

## 2022-05-23 RX ORDER — PROMETHAZINE HYDROCHLORIDE 25 MG/ML
12.5 INJECTION, SOLUTION INTRAMUSCULAR; INTRAVENOUS ONCE AS NEEDED
Status: DISCONTINUED | OUTPATIENT
Start: 2022-05-23 | End: 2022-05-23 | Stop reason: HOSPADM

## 2022-05-23 RX ORDER — ONDANSETRON 2 MG/ML
INJECTION INTRAMUSCULAR; INTRAVENOUS AS NEEDED
Status: DISCONTINUED | OUTPATIENT
Start: 2022-05-23 | End: 2022-05-23

## 2022-05-23 RX ORDER — HYDROMORPHONE HCL/PF 1 MG/ML
0.5 SYRINGE (ML) INJECTION
Status: DISCONTINUED | OUTPATIENT
Start: 2022-05-23 | End: 2022-05-23 | Stop reason: HOSPADM

## 2022-05-23 RX ORDER — BUPIVACAINE HYDROCHLORIDE 2.5 MG/ML
INJECTION, SOLUTION EPIDURAL; INFILTRATION; INTRACAUDAL
Status: COMPLETED | OUTPATIENT
Start: 2022-05-23 | End: 2022-05-23

## 2022-05-23 RX ORDER — CEFAZOLIN SODIUM 1 G/50ML
1000 SOLUTION INTRAVENOUS ONCE
Status: COMPLETED | OUTPATIENT
Start: 2022-05-23 | End: 2022-05-23

## 2022-05-23 RX ADMIN — BUPIVACAINE 20 ML: 13.3 INJECTION, SUSPENSION, LIPOSOMAL INFILTRATION at 07:25

## 2022-05-23 RX ADMIN — SODIUM CHLORIDE, SODIUM LACTATE, POTASSIUM CHLORIDE, AND CALCIUM CHLORIDE: .6; .31; .03; .02 INJECTION, SOLUTION INTRAVENOUS at 07:35

## 2022-05-23 RX ADMIN — BUPIVACAINE HYDROCHLORIDE 10 ML: 2.5 INJECTION, SOLUTION EPIDURAL; INFILTRATION; INTRACAUDAL; PERINEURAL at 07:27

## 2022-05-23 RX ADMIN — LIDOCAINE HYDROCHLORIDE 50 MG: 10 INJECTION, SOLUTION EPIDURAL; INFILTRATION; INTRACAUDAL; PERINEURAL at 07:32

## 2022-05-23 RX ADMIN — PROPOFOL 200 MG: 10 INJECTION, EMULSION INTRAVENOUS at 07:32

## 2022-05-23 RX ADMIN — ONDANSETRON 4 MG: 2 INJECTION INTRAMUSCULAR; INTRAVENOUS at 07:32

## 2022-05-23 RX ADMIN — BUPIVACAINE HYDROCHLORIDE 10 ML: 5 INJECTION, SOLUTION PERINEURAL at 07:25

## 2022-05-23 RX ADMIN — KETOROLAC TROMETHAMINE 15 MG: 30 INJECTION, SOLUTION INTRAMUSCULAR at 08:07

## 2022-05-23 RX ADMIN — CEFAZOLIN SODIUM 1000 MG: 1 SOLUTION INTRAVENOUS at 07:35

## 2022-05-23 RX ADMIN — DEXAMETHASONE SODIUM PHOSPHATE 10 MG: 10 INJECTION, SOLUTION INTRAMUSCULAR; INTRAVENOUS at 07:32

## 2022-05-23 RX ADMIN — FENTANYL CITRATE 50 MCG: 50 INJECTION, SOLUTION INTRAMUSCULAR; INTRAVENOUS at 07:44

## 2022-05-23 NOTE — DISCHARGE INSTRUCTIONS
DONNIE Canela  Attending, [address] [place] [address] Office Phone: [phone] ? Fax: [phone]  [facility] Office Phone: [phone] ? OHI:[phone]    : Katelyn Ivory [place], [place]     Surgery Coordinators [facility]: Nico Zuluaga, [phone]  Wendy Baldomero  [phone]  Surgery Coordinator [place]:  Barbra Salcedo [address], [phone]  www [email or web address]   PRE-OPERATIVE AND POST-OPERATIVE INSTRUCTIONS    General Information:  Typical post operative visits are at the following intervals:  2-3 weeks post surgery, 6 weeks post surgery, 3 months post surgery, 6 months post surgery, and then on a yearly basis  However, this may change based on Dr Mayes Fuel recommendation  #1 post-operative rule for foot/ankle surgery:  ONCE YOU ARE OUT OF YOUR CAST AND/OR REMOVABLE BOOT, SWELLING MAY PERSIST FOR MANY MONTHS  YOU MIGHT ALSO EXPERIENCE A BLUISH DISCOLORATION OF YOUR LEG  THIS IS NORMAL AND PART OF THE USUAL POSTOPERATIVE EXPERIENCE  DO NOT WAIT UNTIL YOUR BLOCK WEARS OFF TO TAKE YOUR PAIN MEDICATION  IT TAKES A FEW DOSES OF THE PAIN MEDICATION TO REACH A THERAPEUTIC LEVEL  TAKE A TABLET PROACTIVELY BEFORE YOU HAVE ANY PAIN AND AGAIN 4 HOURS LATER SO WHEN THE BLOCK WEARS OFF, YOU ARE NOT CAUGHT OFF GUARD  SMOKING:  Smoking results in incomplete healing of fractures (broken bones) and joints that my have been fused  Smoking and nicotine also prevents the growth of bone into ankle replacements and bone healing  It also slows the healing of muscles and skin (soft tissue)  Therefore, please do not have surgery if you continue to smoke  We reserve the right to cancel your surgery if we suspect that you are smoking  DO NOT use nicorette gum or other patches    Please find an alternative method to quit smoking before your surgery and do not restart after surgery to allow for healing  THREE RULES:    After surgery you will most likely be given the instructions KEEP YOUR TOES ABOVE YOUR NOSE    This means that you MUST have your feet elevated higher than your heart  Keeping your toes above your nose helps to heal the muscles and skin (soft tissues) by reducing swelling in your leg  This position also helps to prevent infection, and is very important in avoiding deep venous thrombosis (blood clots)  In order to keep the blood circulating in your legs and in order to avoid deep vein   thrombosis (blood clots), we ask patients to GET UP ONCE AN HOUR during the day  This means you should at least cross the room and come back  It does not mean you have to be up for long periods of time  In most cases we will not have people immediately put any weight on their operated part  This is important to prevent loosening of metal or other devices holding the bones together  It also prevents irritation of the soft tissues which can lead to prolonged healing  When we say get up once an hour, please walk, hop or move with an assisted device  This is important! Do not do any excessive walking during the first few days after surgery  Recovering from surgery is a full-time task for the patient  Postoperative care is important to avoid irritating the skin incision, which can lead to infection  Please do not plan activities or go out of town for several weeks after surgery  AFTER YOUR SURGERY:  Bleeding through the bandage almost always occurs  Do not let this alarm you  Simply add more gauze or a towel, call us, and come in for a dressing change  If you think it is excessive, contact us immediately or go to the local emergency room  Do not get the bandage wet  Showering is possible with plastic protectors  Be very careful, as the bathroom can be wet and slippery    If you do get your dressing wet, it should be changed immediately  Please contact us  ONCE YOUR ARE OUT OF YOUR CAST AND/OR REMOVABLE BOOT, SWELLING MAY PERSIST FOR MANY MONTHS  THERE WILL ALSO BE A BLUISH DISCOLORATION OF YOUR LEG FOR MONTHS  THIS IS NORMAL AND PART OF THE USUAL POSTOPERATIVE EXPERIENCE  WEARING COMPRESSION HOSE (ELASTIC STOCKINGS) CAN HELP AVOID SOME OF THIS SWELLING  Ice the area 20 minutes every hour once the nerve block wears off  If you are in a cast or a splint, you may need to leave the ice on longer than 20 minutes in order to feel any benefits  DRESSING:   The purpose of the surgical dressing is to keep your wound and the surgical site protected from the environment  Most dressings contain splints, which help to hold your foot and ankle in a corrected position, and also allow the surgical site to heal properly  If you have a drain in place, this will need to be removed in 1 day after surgery  The time for the drain to be pulled will be written on your discharge instruction sheet  CAST  INSTRUCTIONS:  You may or may not get a cast following surgery  If you do, pay close attention to the following:    After application of a splint or cast, it is very important to elevate your leg for 24 to 72 hours  The injured area should be elevated well above the heart  Remember Toes above your Nose  Rest and elevation greatly reduce pain and speed the healing process by minimizing early swelling      CALL YOUR DOCTORS OFFICE OR VISIT LOCATION EMERGENCY ROOM IF YOU HAVE ANY OF THE FOLLOWING:    Significant increased pain, which may be caused by swelling (Strict elevation will alleviate this)  Numbness and tingling in your hand or foot, which may be caused by too much pressure on the nerves (There is always some numbness after surgery due to nerve blocks)  Burning and stinging, which may be caused by too much pressure on the skin  Excessive swelling below the cast, which may mean the cast is slowing your blood circulation  Loss of active movement of toes, which request an urgent evaluation  Loss of capillary refill  Pinch the tip of toes and aleshia the skin  Release pressure and if the skin does not return pink then call the office immediately  DO NOT GET YOUR CAST WET  Bacteria thrive in moist dark areas  We do not want this  If your cast becomes wet, return to the office and we will apply another one  PAIN AFTER SURGERY:  Narcotic pain medication can and will depress your respiratory system if taken in excess  The goal of pain management with narcotics is to be comfortable not pain free  If you take enough narcotics to be pain free then you run the risk of stopping breathing  If this happens, call 911 immediately! Pain in the heel is often  caused by pressure from the weight of your foot on the bed  Make sure your heel is suspended off the bed by keeping a pillow underneath your calf not your knee  Medications: You will be given narcotic pain medication  Do NOT drive while taking narcotic medications  Medications such as Darvocet, Percocet, Vicoden or Tylenol #3, also contain acetaminophen (Tylenol)  Do not take acetaminophen or Tylenol from home when taking theses medications  When you fill your prescription, you may ask the pharmacist if your pain medication has acetaminophen/Tylenol in it  It is okay to take Tylenol with Oxycontin/Oxycodone  Unless you are allergic to aspirin or currently taking a blood thinner, Dr Lauryn Ervin patients are requested to take one 325 mg aspirin every 12 hours until you are back to walking normally after surgery (This can be up to 6 weeks)  Ecotrin (Enteric-coated aspirin) is more sensitive to the stomach and we recommend purchasing this instead of regular aspirin to minimize the risk of stomach irritation  Narcotic medications commonly cause nausea  Taking them with food will decrease this side effect   If you are having extreme nausea, please contact us for an alternative medication or for something that can be taken with this medication to decrease the nausea  Also, narcotic medications frequently cause constipation  An increase of fiber, fruits and vegetables in your diet may alleviate this problem, or if necessary, you may use an over-the-counter medication such as senekot, colace, or Fibercon for constipation problems  You should resume all medications you were taking prior to the surgery unless otherwise specified  If you had fracture surgery, bony surgery like an osteotomy or fusion, or a surgery that requires bone healing, you are advised to take Vitamin D and Calcium to improve healing potential   Vitamin D3 4000 units/day and Calcium 1200mg/day  These are over the counter medications so please pick them up at the pharmacy when you are picking up your prescriptions  Activity:   Because of your recent foot surgery, your activity level will decrease  You will need to elevate your foot ABOVE the level of your heart for a minimum of four days  The length of time necessary for the swelling to go down, and for your wounds to heal properly depends greatly on your efforts here  Elevation is extremely important to avoid compromising the blood supply to your foot  Remember when your foot is down it will swell, which will increase pain and slow healing  Wiggle your toes frequently if possible  If you go home with a regional block, (a type of anesthesia) the foot and leg will be numb  Think of ways to get into your house and around the house until the block wears off  Keep in mind that it may be a legal issue if you drive while in a cast or splint, especially when the splint is on the right foot  You may call the Department of Motor Vehicles to schedule a road test if you have adaptive equipment applied to your car  The amount of weight you are allowed to bear on your foot will be written on your discharge sheet filled out at the time of surgery   The following is an explanation of the possibilities:     Non-weight bearing: You are to put NO weight whatsoever on your foot  When using crutches or a walker, your foot should not touch the ground, except when you are standing  Then, it may rest on the ground  If you are to be non-weight bearing, and you are not compliant, you could compromise the surgery  Some of our patients have been requesting prescriptions for a roll-a-bout knee scooter  [employer]BS and other insurances have been denying these claims, and you may either have to rent one or pay out of pocket to purchase one

## 2022-05-23 NOTE — ANESTHESIA PROCEDURE NOTES
Peripheral Block    Patient location during procedure: holding area  Reason for block: at surgeon's request and post-op pain management  Preanesthetic Checklist  Completed: patient identified, IV checked, site marked, risks and benefits discussed, surgical consent, monitors and equipment checked, pre-op evaluation and timeout performed  Peripheral Block  Prep: ChloraPrep  Patient monitoring: continuous pulse ox and frequent blood pressure checks  Block type: adductor canal block  Laterality: left  Injection technique: single-shot  Procedures: ultrasound guided, Ultrasound guidance required for the procedure to increase accuracy and safety of medication placement and decrease risk of complications    Ultrasound permanent image savedbupivacaine (PF) (MARCAINE) 0 25 % 10 mL - Perineural   10 mL - 5/23/2022 7:27:00 AM  Needle  Test dose: negative  Assessment  Injection assessment: incremental injection and local visualized surrounding nerve on ultrasound  Paresthesia pain: none  Heart rate change: no  Slow fractionated injection: yes  patient tolerated the procedure well with no immediate complications

## 2022-05-23 NOTE — ANESTHESIA PREPROCEDURE EVALUATION
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)      Well controlled GERD    Physical Exam    Airway    Mallampati score: II  TM Distance: >3 FB  Neck ROM: full     Dental       Cardiovascular  Cardiovascular exam normal    Pulmonary  Pulmonary exam normal     Other Findings        Anesthesia Plan  ASA Score- 3     Anesthesia Type- general with ASA Monitors  Additional Monitors:   Airway Plan: LMA  Comment: W/ block  Plan Factors-Exercise tolerance (METS): >4 METS  Chart reviewed  EKG reviewed  Imaging results reviewed  Existing labs reviewed  Patient summary reviewed  Patient is not a current smoker  Patient did not smoke on day of surgery  Obstructive sleep apnea risk education given perioperatively  Induction- intravenous  Postoperative Plan- Plan for postoperative opioid use  Planned trial extubation    Informed Consent- Anesthetic plan and risks discussed with patient  I personally reviewed this patient with the CRNA  Discussed and agreed on the Anesthesia Plan with the CRNA  Eryn Blankenship

## 2022-05-23 NOTE — ANESTHESIA PROCEDURE NOTES
Peripheral Block    Patient location during procedure: holding area  Reason for block: at surgeon's request and post-op pain management  Preanesthetic Checklist  Completed: patient identified, IV checked, site marked, risks and benefits discussed, surgical consent, monitors and equipment checked, pre-op evaluation and timeout performed  Peripheral Block  Prep: ChloraPrep  Patient monitoring: continuous pulse ox and frequent blood pressure checks  Block type: popliteal  Laterality: left  Procedures: ultrasound guided, Ultrasound guidance required for the procedure to increase accuracy and safety of medication placement and decrease risk of complications  bupivacaine (MARCAINE) 0 5 % - Perineural   10 mL - 5/23/2022 7:25:00 AM  Needle  Test dose: negative  Assessment  Injection assessment: incremental injection and local visualized surrounding nerve on ultrasound  Paresthesia pain: none  Heart rate change: no  Slow fractionated injection: yes  patient tolerated the procedure well with no immediate complications

## 2022-05-23 NOTE — OP NOTE
OPERATIVE REPORT  PATIENT NAME: Jorge Terrazas    :  1960  MRN: [id number]  Pt Location:  OR ROOM 03    SURGERY DATE: 2022    Surgeon(s) and Role:     Ryder Concepcion MD - Primary     * Brady Conroy PA-C - Assisting    Preop Diagnosis:  Other closed fracture of distal end of left fibula, initial encounter [W80 242A]    Post-Op Diagnosis Codes:     * Other closed fracture of distal end of left fibula, initial encounter [S82 472A]    Procedure(s) (LRB):  OPEN REDUCTION W/ INTERNAL FIXATION (ORIF) ANKLE (Left)    Specimen(s):  * No specimens in log *    Estimated Blood Loss:   Minimal    Drains:  * No LDAs found *    Anesthesia Type:   Choice    Operative Indications: Other closed fracture of distal end of left fibula, initial encounter [S82 072A]      Operative Findings:  Consistent with diagnosis    Complications:   None    Procedure and Technique:  1  Open reduction and internal fixation of lateral malleolus ankle fracture  2  Placement of short leg nonweightbearing plaster splint   3  Intraoperative fluoroscopic interpretation, greater than one hour, no radiologist       OPERATIVE REPORT:    After informed consent and preoperative medical clearance were obtained, the patient was taken to the preoperative holding area  Allergies were properly assessed and patient was given appropriate perioperative IV antibiotics without complication  Please see the anesthesia report for details of the anesthesia administered  Patient was taken the operating room placed supine on the operating room table  All bony prominences and skin were well padded, airway maintained, genitalia protected and brachial plexi/ulnar nerves at the elbows protected  Patient's operative lower extremity was prepped and draped in sterile fashion  The operative lower extremity was exsanguinated with esmarch elastic bandage and a thigh tourniquet was utilized   A time-out was performed with the attending surgeon in the room  A longitudinal incision was made laterally over the fibula taking care to protect the superficial peroneal nerve which traversed the field proximally  Careful soft tissue dissection was performed  With minimal periosteal stripping, the fibular fracture fracture was exposed  The hematoma was evacuated  The fractures were reduced into an anatomic position  Temporary reduction was maintained with a clamp and intraoperative fluoroscopy used in multiple planes confirmed appropriate reduction and length of the fibula  Under fluoroscopic guidance, lag screws (as listed below under IMPLANTS) were placed across the fracture site with satisfactory purchase  A fibular plate (as listed below under IMPLANTS) was then placed and secured after fluoroscopy confirmed appropriate position the plate  Screws had satisfactory purchase distally and proximally  Intraoperative fluoroscopy confirmed appropriate alignment and length of the fibula with appropriate hardware position  Dorsiflexion and external rotation stress testing demonstrated no medial clear space widening and no instability to the syndesmosis  Thorough irrigation was performed using copious amounts of sterile saline  The tourniquet was released and meticulous hemostasis was obtained  The wound was closed in layers with Vicryl suture for the deeper layers and nylon suture for the skin for a tension-less closure  There was no blanching at the skin margins after closure  Sterile dressings were applied with the ankle in neutral position and over-abundant padding with a posterior/sugar tong splint was applied  Satisfactory capillary refill remained in all toes  Patient tolerated the procedure well  There were no complications  He was taken to the recovery room in stable condition  DISPOSITION:   1  Patient is stable to PACU  2  Non-weightbearing to lower extremity for 3 weeks  3  Pain control  4  DVT prophylaxis with ASA 325mg BID    5  Follow-up at 3 weeks with suture removal if appropriate      I was present for the entire procedure, A qualified resident physician was not available and A physician assistant was required during the procedure for retraction tissue handling,dissection and suturing    Patient Disposition:  PACU       SIGNATURE: Gregoria Bello MD  DATE: May 23, 2022  TIME: 8:19 AM

## 2022-05-23 NOTE — INTERVAL H&P NOTE
H&P reviewed  After examining the patient I find no changes in the patients condition since the H&P had been written      Vitals:    05/23/22 0635   BP: 139/74   Pulse: 65   Resp: 16   SpO2: 99%       Left ankle ORIF

## 2022-05-23 NOTE — ANESTHESIA POSTPROCEDURE EVALUATION
Post-Op Assessment Note    CV Status:  Stable  Pain Score: 0    Pain management: adequate     Mental Status:  Awake   Hydration Status:  Stable   PONV Controlled:  None   Airway Patency:  Patent      Post Op Vitals Reviewed: Yes      Staff: CRNA         No complications documented      BP   99/67   Temp 97 1   Pulse 74   Resp 16   SpO2 99%

## 2022-05-24 ENCOUNTER — PATIENT MESSAGE (OUTPATIENT)
Dept: OBGYN CLINIC | Facility: CLINIC | Age: 62
End: 2022-05-24

## 2022-06-07 ENCOUNTER — TELEPHONE (OUTPATIENT)
Dept: OBGYN CLINIC | Facility: HOSPITAL | Age: 62
End: 2022-06-07

## 2022-06-07 NOTE — TELEPHONE ENCOUNTER
I spoke with patient and she states that she is icing and elevating and it is better  The top of the foot seems a little swollen  She will continue to ice and elevate and will call tomorrow if no improvement for sooner appt  Patient on for PO 6/14

## 2022-06-07 NOTE — TELEPHONE ENCOUNTER
If the splint she is in is not broken, she did not damage anything and would just continue Ice, elevation and staying off the foot  No need for an earlier appointment

## 2022-06-07 NOTE — TELEPHONE ENCOUNTER
Patient sees Dr Toma Peña  Patient is calling in stating that she had surgery on 5/23 to her left ankle  She is stating that she had fallen forward on a level surface and her ankle feels tight and as though it is swelling  Patient is asking as to what further she should do relating this            Call back# [phone]

## 2022-06-13 ENCOUNTER — EVALUATION (OUTPATIENT)
Dept: PHYSICAL THERAPY | Facility: REHABILITATION | Age: 62
End: 2022-06-13
Payer: COMMERCIAL

## 2022-06-13 DIAGNOSIS — S82.832A OTHER CLOSED FRACTURE OF DISTAL END OF LEFT FIBULA, INITIAL ENCOUNTER: Primary | ICD-10-CM

## 2022-06-13 PROCEDURE — 97161 PT EVAL LOW COMPLEX 20 MIN: CPT | Performed by: PHYSICAL THERAPIST

## 2022-06-13 PROCEDURE — 97110 THERAPEUTIC EXERCISES: CPT | Performed by: PHYSICAL THERAPIST

## 2022-06-13 NOTE — PROGRESS NOTES
PT Evaluation     Today's date: 2022  Patient name: Radha Jiménez  : 1960  MRN: [id number]  Referring provider: Scott Mccoy PA-C  Dx:   Encounter Diagnosis     ICD-10-CM    1  Other closed fracture of distal end of left fibula, initial encounter  S85 784B Ambulatory referral to Physical Therapy                  Assessment  Assessment details: Pt is a pleasant 62 y o  female presenting to outpatient physical therapy with Other closed fracture of distal end of left fibula, initial encounter  (primary encounter diagnosis)   Pt presents with pain, decreased range of motion, decreased strength, abnormal gait mechanics, impaired static and dynamic balance, as well as decreased tolerance to activity  Will assess ankle A/PROM next visit, upon removal of cast      Pt is a good candidate for outpatient physical therapy and would benefit from skilled physical therapy to address limitations and to achieve goals  Thank you for this referral    Impairments: abnormal coordination, abnormal gait, abnormal or restricted ROM, activity intolerance, impaired balance, impaired physical strength, pain with function and weight-bearing intolerance  Understanding of Dx/Px/POC: good   Prognosis: good    Goals  ST  Patient will be able to ambulate throughout home with [address] and outside 250 feet independently in 4 weeks  2  Patient will demonstrate 25% improvement in ROM in 4 weeks  3  Patient will demonstrate 1/2 grade improvement in strength in 4 weeks  LT  Patient will be able to perform IADLS without restriction or pain by discharge  2  Patient will be independent in HEP by discharge  3  Patient will be able to return to recreational/work duties without restriction or pain by discharge        Plan  Patient would benefit from: PT eval and skilled PT  Planned modality interventions: cryotherapy and thermotherapy: hydrocollator packs  Planned therapy interventions: IADL retraining, body mechanics training, flexibility, functional ROM exercises, home exercise program, neuromuscular re-education, manual therapy, postural training, strengthening, stretching, therapeutic activities, therapeutic exercise, joint mobilization, balance/weight bearing training and patient education  Frequency: 2x week  Duration in visits: 8  Duration in weeks: 4  Treatment plan discussed with: patient        Subjective Evaluation    History of Present Illness  Mechanism of injury: 06/13/22  Pt comes to therapy reporting fall down her basement stairs 4/28/22, resulting in fracture to L ankle  States she had subsequent surgery on 5/23/22, placed in a cast, and is NWB with RW  Reports she has had minimal to no pain in her LLE since the surgery  Notes she elevates LE for edema control  Notes she lives alone in a ranch home, with one step to enter through garage, but no steps to enter through front door  Reports she has follow up with physician tomorrow  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 [employer]  Reports she had been going for 1 5 mile walks almost every day of the week prior to the injury  GOALS: return to walking recreationally eventually     Pain  No pain reported    Patient Goals  Patient goals for therapy: decreased pain, increased motion, improved balance, increased strength and independence with ADLs/IADLs          Objective     Strength/Myotome Testing     Left Hip   Planes of Motion   Flexion: 4-  Abduction: 4  Adduction: 4+  External rotation: 4+  Internal rotation: 4+    Right Hip   Planes of Motion   Flexion: 4  Abduction: 4  Adduction: 4+  External rotation: 4+  Internal rotation: 4+    Left Knee   Flexion: 4+  Extension: 4-    Right Knee   Flexion: 4+  Extension: 4+    Tests     Additional Tests Details  06/13/22  A/PROM to be assessed NV/upon removal of cast             Precautions: n/a  Daily Treatment Diary     Date 6/13            FOTO IE            Re-eval IE                Manuals 6/13            PROM ankle Neuro Re-Ed                                                                                                Ther Ex    BAPS             Ankle AROM all             Ankle circles             Ankle ABCs             TB ankle all                                                    Ther Activity    bike                          Gait Training                              Modalities    CP PRN

## 2022-06-14 ENCOUNTER — OFFICE VISIT (OUTPATIENT)
Dept: OBGYN CLINIC | Facility: CLINIC | Age: 62
End: 2022-06-14

## 2022-06-14 VITALS — BODY MASS INDEX: 27.49 KG/M2 | WEIGHT: 165 LBS | HEIGHT: 65 IN

## 2022-06-14 DIAGNOSIS — S82.832A OTHER CLOSED FRACTURE OF DISTAL END OF LEFT FIBULA, INITIAL ENCOUNTER: Primary | ICD-10-CM

## 2022-06-14 PROCEDURE — 99024 POSTOP FOLLOW-UP VISIT: CPT | Performed by: ORTHOPAEDIC SURGERY

## 2022-06-14 NOTE — PROGRESS NOTES
DONNIE Bell  Attending, Orthopaedic Surgery  Foot and Ankle  [facility]      ORTHOPAEDIC FOOT AND ANKLE POST-OP VISIT     Procedure:     ORIF lateral malleolus, left       Date of surgery:   5/23/22      PLAN  1  Weightbearing Status- PWB operative extremity in cam boot per protocol provided in AVSS  2  DVT prophylaxis- ASA 325mg BID  3  Continue to elevate 23hrs/day getting up 1x per hour to prevent a blood clot  4  Pain control- OTC pain medication  5  RTC in 3 weeks  6  Xrays needed next visit - yes Weightbearing Ankle    History of Present Illness:   Chief Complaint: s/p above procedure  Shirline Cowden is a 62 y o  female who is being seen for post-operative visit for the above procedure  Pain is well controlled and the patient has successfully transitioned to OTC pain medicines  she is taking ASA 325mg BID for DVT prophylaxis  Patient has been NWB in a Splint  Review of Systems:  General- denies fever/chills  Respiratory- denies cough or SOB  Cardio- denies chest pain or palpitations  GI- denies abdominal pain  Musculoskeletal- Negative except noted above  Skin- denies rashes or wounds    Physical Exam:   There were no vitals taken for this visit  General/Constitutional: No apparent distress: well-nourished and well developed  Eyes: normal ocular motion  Lymphatic: No appreciable lymphadenopathy  Respiratory: Non-labored breathing  Vascular: No edema, swelling or tenderness, except as noted in detailed exam   Integumentary: No impressive skin lesions present, except as noted in detailed exam   Neuro: No ataxia or tremors noted  Psych: Normal mood and affect, oriented to person, place and time  Appropriate affect  Musculoskeletal: Normal, except as noted in detailed exam and in HPI      Examination    left        Incision Clean, dry, intact  Sutures Removed this visit    Ecchymosis none    Swelling Mild    Sensation Intact to light touch throughout sural, saphenous, superficial peroneal, deep peroneal and medial/lateral plantar nerve distributions  [place] 5 07 filament (10g) testing deferred  Cardiovascular Brisk capillary refill < 2 seconds,intact DP and PT pulses    Special Tests None      Imaging Studies:   Intra-operative images were reviewed with the patient        Ruthy Rogers Lachman, MD  Foot & Ankle Surgery   Department of [address] [place] [address]      I personally performed the service  Ruthy Rogers Lachman, MD    Scribe Attestation    I,:  Maureen Soto MA am acting as a scribe while in the presence of the attending physician :       I,:  Bonifacio Groves MD personally performed the services described in this documentation    as scribed in my presence :

## 2022-06-14 NOTE — PATIENT INSTRUCTIONS
7 days of partial weight bearing 50%  Then, 7 days of partial weight bearing 75%  Then, 7 days of partial weight bearing 90%  Then full weight bearing in the boot and wean your crutches/rolling walker  Continue aspirin/lovenox for blood clot prevention  May shower, do not soak in a tub/pool/ocean/etc for another 4 weeks  Begin PT  Scar massage- pea sized amount of lotion, massage into scar for 5 minutes each day  Compression stocking (Knee high, 20-30mm Hg) to be worn at all times while awake  Recommend taking the following supplements: Vitamin D3-4000 units per day and Calcium 1200 mg per day  This will help with bone healing  Wear the boot at all times except when showering and in PT, even to sleep at night

## 2022-06-14 NOTE — LETTER
June 14, 2022     Patient: Royal Abel  YOB: 1960  Date of Visit: 6/14/2022      To Whom it May Concern: Royal Abel is under my professional care  Eusebia Moeller was seen in my office on 6/14/2022  Eusebia Moeller may return to work with light duty/desk duty only  If you have any questions or concerns, please don't hesitate to call           Sincerely,          Joe Correia MD        CC: Tod Chinchilla

## 2022-06-16 ENCOUNTER — OFFICE VISIT (OUTPATIENT)
Dept: PHYSICAL THERAPY | Facility: REHABILITATION | Age: 62
End: 2022-06-16
Payer: COMMERCIAL

## 2022-06-16 DIAGNOSIS — S82.832A OTHER CLOSED FRACTURE OF DISTAL END OF LEFT FIBULA, INITIAL ENCOUNTER: Primary | ICD-10-CM

## 2022-06-16 PROCEDURE — 97140 MANUAL THERAPY 1/> REGIONS: CPT | Performed by: PHYSICAL THERAPIST

## 2022-06-16 PROCEDURE — 97530 THERAPEUTIC ACTIVITIES: CPT | Performed by: PHYSICAL THERAPIST

## 2022-06-16 PROCEDURE — 97112 NEUROMUSCULAR REEDUCATION: CPT | Performed by: PHYSICAL THERAPIST

## 2022-06-16 PROCEDURE — 97110 THERAPEUTIC EXERCISES: CPT | Performed by: PHYSICAL THERAPIST

## 2022-06-16 NOTE — PROGRESS NOTES
Daily Note     Today's date: 2022  Patient name: Torin Diaz  : 1960  MRN: [id number]  Referring provider: Isabell Harp PA-C  Dx:   Encounter Diagnosis     ICD-10-CM    1  Other closed fracture of distal end of left fibula, initial encounter  [id number]                   Subjective: Pt comes to therapy noting she had follow up with orthopedic physician, where she was placed in a CAM boot and educated in weight-bearing protocol  Ambulates into clinic with RW  Continues to deny pain in ankle/foot  Objective: See treatment diary below      Assessment: Tolerated treatment well  Cues for set up and form during exercises  Patient exhibited good technique with therapeutic exercises and would benefit from continued PT      Plan: Progress treatment as tolerated         Precautions: n/a  Daily Treatment Diary     Date            FOTO IE            Re-eval IE                Manuals             PROM ankle  MR           Talocrural PA mob  MR gr 3-4                                     Neuro Re-Ed     Toe curls  30x towel           BIODEX-ws  30x 50%                                                  Ther Ex    Rockerboard  Seated  30x           Ankle AROM all  10x ea           Ankle circles  30x CW  /CCW           Ankle ABCs  nv           TB ankle all  nv           LAQ  3# x15                                     Ther Activity    bike  nv                        Gait Training                              Modalities    CP PRN

## 2022-06-16 NOTE — PROGRESS NOTES
PT Evaluation     Today's date: 2022  Patient name: Lanney Mohs  : 1960  MRN: [id number]  Referring provider: Jim Izaguirre PA-C  Dx:   Encounter Diagnosis     ICD-10-CM    1  Other closed fracture of distal end of left fibula, initial encounter  S82 [id number]                   Assessment  Assessment details: Pt is a pleasant 62 y o  female presenting to outpatient physical therapy with Other closed fracture of distal end of left fibula, initial encounter  (primary encounter diagnosis)   Pt presents with pain, decreased range of motion, decreased strength, abnormal gait mechanics, impaired static and dynamic balance, as well as decreased tolerance to activity  Will assess ankle A/PROM next visit, upon removal of cast      Pt is a good candidate for outpatient physical therapy and would benefit from skilled physical therapy to address limitations and to achieve goals  Thank you for this referral    Impairments: abnormal coordination, abnormal gait, abnormal or restricted ROM, activity intolerance, impaired balance, impaired physical strength, pain with function and weight-bearing intolerance  Understanding of Dx/Px/POC: good   Prognosis: good    Goals  ST  Patient will be able to ambulate throughout home with [facility] and outside 250 feet independently in 4 weeks  2  Patient will demonstrate 25% improvement in ROM in 4 weeks  3  Patient will demonstrate 1/2 grade improvement in strength in 4 weeks  LT  Patient will be able to perform IADLS without restriction or pain by discharge  2  Patient will be independent in HEP by discharge  3  Patient will be able to return to recreational/work duties without restriction or pain by discharge        Plan  Patient would benefit from: PT eval and skilled PT  Planned modality interventions: cryotherapy and thermotherapy: hydrocollator packs  Planned therapy interventions: IADL retraining, body mechanics training, flexibility, functional ROM exercises, home exercise program, neuromuscular re-education, manual therapy, postural training, strengthening, stretching, therapeutic activities, therapeutic exercise, joint mobilization, balance/weight bearing training and patient education  Frequency: 2x week  Duration in visits: 8  Duration in weeks: 4  Treatment plan discussed with: patient        Subjective Evaluation    History of Present Illness  Mechanism of injury: 06/13/22  Pt comes to therapy reporting fall down her basement stairs 4/28/22, resulting in fracture to L ankle  States she had subsequent surgery on 5/23/22, placed in a cast, and is NWB with RW  Reports she has had minimal to no pain in her LLE since the surgery  Notes she elevates LE for edema control  Notes she lives alone in a ranch home, with one step to enter through garage, but no steps to enter through front door  Reports she has follow up with physician tomorrow  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 [employer]  Reports she had been going for 1 5 mile walks almost every day of the week prior to the injury  GOALS: return to walking recreationally eventually     Pain  No pain reported    Patient Goals  Patient goals for therapy: decreased pain, increased motion, improved balance, increased strength and independence with ADLs/IADLs          Objective     Active Range of Motion   Left Ankle/Foot   Dorsiflexion (ke): 0 degrees   Plantar flexion: 25 degrees   Inversion: 8 degrees   Eversion: 11 degrees     Passive Range of Motion   Left Ankle/Foot    Dorsiflexion (ke): 7 degrees   Plantar flexion: 43 degrees   Inversion: 12 degrees   Eversion: 20 degrees     Strength/Myotome Testing     Left Hip   Planes of Motion   Flexion: 4-  Abduction: 4  Adduction: 4+  External rotation: 4+  Internal rotation: 4+    Right Hip   Planes of Motion   Flexion: 4  Abduction: 4  Adduction: 4+  External rotation: 4+  Internal rotation: 4+    Left Knee   Flexion: 4+  Extension: 4-    Right Knee   Flexion: 4+  Extension: 4+    Tests     Additional Tests Details  06/13/22  A/PROM to be assessed NV/upon removal of cast    06/16/22  Gait - RW, PWB in CAM boot             Precautions: n/a  Daily Treatment Diary     Date 6/13 6/16           FOTO IE            Re-eval IE                Manuals             PROM ankle  MR           Talocrural PA mob  MR gr 3-4                                     Neuro Re-Ed     Toe curls  30x towel           BIODEX-ws  30x 50%                                                  Ther Ex    Rockerboard  Seated  30x           Ankle AROM all  10x ea           Ankle circles  30x CW  /CCW           Ankle ABCs  nv           TB ankle all  nv           LAQ  3# x15                                     Ther Activity    bike  nv                        Gait Training                              Modalities    CP PRN                                Manual therapy performed by Brandon Reyez, SPT, under direct supervision of Radha Huizar, PT, DPT

## 2022-06-20 ENCOUNTER — OFFICE VISIT (OUTPATIENT)
Dept: PHYSICAL THERAPY | Facility: REHABILITATION | Age: 62
End: 2022-06-20
Payer: COMMERCIAL

## 2022-06-20 DIAGNOSIS — S82.832A OTHER CLOSED FRACTURE OF DISTAL END OF LEFT FIBULA, INITIAL ENCOUNTER: Primary | ICD-10-CM

## 2022-06-20 PROCEDURE — 97140 MANUAL THERAPY 1/> REGIONS: CPT

## 2022-06-20 PROCEDURE — 97530 THERAPEUTIC ACTIVITIES: CPT

## 2022-06-20 PROCEDURE — 97112 NEUROMUSCULAR REEDUCATION: CPT

## 2022-06-20 PROCEDURE — 97110 THERAPEUTIC EXERCISES: CPT

## 2022-06-20 NOTE — PROGRESS NOTES
Daily Note     Today's date: 2022  Patient name: Judge Mckee  : 1960  MRN: [id number]  Referring provider: Corbin Reynaga PA-C  Dx:   Encounter Diagnosis     ICD-10-CM    1  Other closed fracture of distal end of left fibula, initial encounter  D41 [id number]                   Subjective: pt presents to therapy with RW in 50% WB in CAM boot  She reports soreness in ankle with WB and thinks she       Objective: See treatment diary below      Assessment: Tolerated treatment well and without complaints  Reviewed healing time line and weight bearing prcess with patient; pt exhibited comprehension and good understanding  Good response with progressions with adverse reactions  Patient demonstrated fatigue post treatment, exhibited good technique with therapeutic exercises and would benefit from continued PT      Plan: Continue per plan of care  Progress treatment as tolerated         Precautions: n/a  Daily Treatment Diary     Date           FOTO IE            Re-eval IE                Manuals             PROM ankle  MR TE          Talocrural PA mob  MR gr 3-4 TE glides                                    Neuro Re-Ed     Toe curls  30x towel 30x towel          BIODEX-ws  30x 50% nv                                                 Ther Ex    Rockerboard  Seated  30x BAPS x20 ea          Ankle AROM all  10x ea x30          Ankle circles  30x CW  /CCW 30x CW  /CCW          Ankle ABCs  nv x1          TB ankle all  nv Pink tb x10 ea          LAQ  3# x15 3# 5"x20                                    Ther Activity    bike  nv L1x5'                       Gait Training                              Modalities    CP PRN

## 2022-06-21 ENCOUNTER — APPOINTMENT (OUTPATIENT)
Dept: PHYSICAL THERAPY | Facility: REHABILITATION | Age: 62
End: 2022-06-21
Payer: COMMERCIAL

## 2022-06-23 ENCOUNTER — TELEPHONE (OUTPATIENT)
Dept: OBGYN CLINIC | Facility: HOSPITAL | Age: 62
End: 2022-06-23

## 2022-06-23 ENCOUNTER — OFFICE VISIT (OUTPATIENT)
Dept: PHYSICAL THERAPY | Facility: REHABILITATION | Age: 62
End: 2022-06-23
Payer: COMMERCIAL

## 2022-06-23 DIAGNOSIS — Z98.890 S/P ORIF (OPEN REDUCTION INTERNAL FIXATION) FRACTURE: Primary | ICD-10-CM

## 2022-06-23 DIAGNOSIS — Z87.81 S/P ORIF (OPEN REDUCTION INTERNAL FIXATION) FRACTURE: Primary | ICD-10-CM

## 2022-06-23 DIAGNOSIS — G40.209 LOCALIZATION-RELATED FOCAL EPILEPSY WITH COMPLEX PARTIAL SEIZURES (HCC): ICD-10-CM

## 2022-06-23 PROCEDURE — 97140 MANUAL THERAPY 1/> REGIONS: CPT | Performed by: PHYSICAL THERAPIST

## 2022-06-23 PROCEDURE — 97112 NEUROMUSCULAR REEDUCATION: CPT | Performed by: PHYSICAL THERAPIST

## 2022-06-23 PROCEDURE — 97110 THERAPEUTIC EXERCISES: CPT | Performed by: PHYSICAL THERAPIST

## 2022-06-23 RX ORDER — LACOSAMIDE 100 MG/1
TABLET ORAL
Qty: 270 TABLET | Refills: 1 | Status: SHIPPED | OUTPATIENT
Start: 2022-06-23 | End: 2022-09-12 | Stop reason: SDUPTHER

## 2022-06-23 RX ORDER — BRIVARACETAM 75 MG/1
75 TABLET, FILM COATED ORAL 2 TIMES DAILY
Qty: 180 TABLET | Refills: 1 | Status: SHIPPED | OUTPATIENT
Start: 2022-06-23

## 2022-06-23 NOTE — TELEPHONE ENCOUNTER
----- Message from Nadia Monreal sent at 6/22/2022  7:52 AM EDT -----  Regarding: refills please  I will be in need of refills for Vimpat and Briviact  Your charts should show 100/200 and 75/75 respectively  90 day supply of each, thru [facility]    Thanks  Dajuan Prescott  6/22/22

## 2022-06-23 NOTE — TELEPHONE ENCOUNTER
Patient needs a note to return to work on 6/27/22  She would like to return to the office and this is needed  She would like it to list her restrictions as well  If we can get this completed and call her at [phone] when ready for    Thank you

## 2022-06-23 NOTE — PROGRESS NOTES
Daily Note     Today's date: 2022  Patient name: Hortencia Gomez  : 1960  MRN: [id number]  Referring provider: Anamika Steele PA-C  Dx:   Encounter Diagnosis     ICD-10-CM    1  S/P ORIF (open reduction internal fixation) fracture  Z98 890     Z87 81          Subjective: Pt presents to therapy PWB with CAM boot and 2-wheel walker  Pt reports minimal pain and states she is "feeling a lot better and really enjoyed the bike warm up"  Pt also mentioned she "almost tripped in her garage today and has some slight residual soreness"  Objective: See treatment diary below      Assessment: Tolerated treatment well  Improved ROM in DF, PF, and INV  Limitations in EV ROM  Pt began 75% WB 2 days ago  Increased Biodex to 75% WS  Increased reps in ankle 4-way with TB  Patient demonstrated fatigue post treatment, exhibited good technique with therapeutic exercises and would benefit from continued PT      Plan: Continue per plan of care          Precautions: n/a  Daily Treatment Diary     Date          FOTO IE            Re-eval IE                Manuals             PROM ankle  MR TE MR          Talocrural PA mob  MR gr 3-4 TE glides MR gr 3-4                                    Neuro Re-Ed     Toe curls  30x towel 30x towel 30x Towel          BIODEX-ws  30x 50% nv 30x 75%                                                Ther Ex    Rockerboard  F/b, side, circles   Seated  30x BAPS x20 ea BAPS  x20 ea         Ankle AROM all  10x ea x30 x30         Ankle circles  30x CW  /CCW 30x CW  /CCW 30xCW/CCW         Ankle ABCs  nv x1 x1         TB ankle all  nv Pink tb x10 ea Pink tb   x30 ea          LAQ  3# x15 3# 5"x20 3# 5"x20                                   Ther Activity    bike  nv L1x5' L1x5'                      Gait Training                              Modalities    CP PRN                            Pt was instructed through exercises and manual therapy with NABIL Pena, under direct supervision of Cassandria Severance, [place], DPT

## 2022-06-23 NOTE — LETTER
June 23, 2022     Patient: Wilfrido Palm  YOB: 1960  Date of Visit: 6/23/2022      To Whom it May Concern: Wilfrido Palm is under my professional care  Reagan Eid was seen in my office on 6/14/22  Reagan Eid may return to work with limitations on 6/27  Sedentary duty/desk duty only  will need to elevate the foot while seated       If you have any questions or concerns, please don't hesitate to call           Sincerely,          Pelon Medina        CC: No Recipients

## 2022-06-23 NOTE — TELEPHONE ENCOUNTER
LM on  for return call  Please inform patient that the note she requested was placed in the chart and she can  at her convenience when call returned

## 2022-06-27 ENCOUNTER — OFFICE VISIT (OUTPATIENT)
Dept: PHYSICAL THERAPY | Facility: REHABILITATION | Age: 62
End: 2022-06-27
Payer: COMMERCIAL

## 2022-06-27 DIAGNOSIS — Z98.890 S/P ORIF (OPEN REDUCTION INTERNAL FIXATION) FRACTURE: Primary | ICD-10-CM

## 2022-06-27 DIAGNOSIS — Z87.81 S/P ORIF (OPEN REDUCTION INTERNAL FIXATION) FRACTURE: Primary | ICD-10-CM

## 2022-06-27 PROCEDURE — 97140 MANUAL THERAPY 1/> REGIONS: CPT | Performed by: PHYSICAL THERAPIST

## 2022-06-27 PROCEDURE — 97110 THERAPEUTIC EXERCISES: CPT | Performed by: PHYSICAL THERAPIST

## 2022-06-27 NOTE — PROGRESS NOTES
Daily Note     Today's date: 2022  Patient name: Ivett Spencer  : 1960  MRN: [id number]  Referring provider: Coco Romeo PA-C  Dx:   Encounter Diagnosis     ICD-10-CM    1  S/P ORIF (open reduction internal fixation) fracture  Z98 890     Z87 81                   Subjective: Pt presents to therapy with CAM boot and 2 wheel walker  Pt states she "is in no pain and is ready to go 100% FWB" on LLE  Pt really enjoys stationary bike and feels it is really helping improve her pain  Objective: See treatment diary below      Assessment: Tolerated treatment well  Improved DF, inv, and ev  PF still limited, attempted A-P grade 3 talocrural mob to improve PF, but was uncomfortable for patient  Increased resistance to green tb for ankle 4 way and increased reps to 30x for LAQ and BAPS  Pt had difficulty today with Biodex-ws  Pt will be able to start 90% WB tolerance from surgeon protocol nv  Patient demonstrated fatigue post treatment, exhibited good technique with therapeutic exercises and would benefit from continued PT      Plan: Progress treatment as tolerated         Precautions: n/a  Daily Treatment Diary     Date         FOTO IE    perf        Re-eval IE                Manuals             PROM ankle  MR TE MR  MR        Talocrural PA mob  MR gr 3-4 TE glides MR gr 3-4  MR gr 3-4 PA/AP                                  Neuro Re-Ed     Toe curls  30x towel 30x towel 30x Towel  30x towel         BIODEX-ws  30x 50% nv 30x 75% 30x 75%                                               Ther Ex    Rockerboard  F/b, side, circles   Seated  30x BAPS x20 ea BAPS  x20 ea BAPS x30 ea         Ankle AROM all  10x ea x30 x30 x30        Ankle circles  30x CW  /CCW 30x CW  /CCW 30xCW/CCW 30x CW  /CCW        Ankle ABCs  nv x1 x1 x1        TB ankle all  nv Pink tb x10 ea Pink tb   x30 ea  Green x30 ea         LAQ  3# x15 3# 5"x20 3# 5"x20 3# 5"x30                                  Ther Activity aniya mckeon L1x5' L1x5' L1x 5'                     Gait Training                              Modalities    CP PRN                            Pt was instructed through exercises and manual therapy with Ja Sanchez SPT, under direct supervision of Nalini Keyes, PT, DPT

## 2022-06-30 ENCOUNTER — OFFICE VISIT (OUTPATIENT)
Dept: PHYSICAL THERAPY | Facility: REHABILITATION | Age: 62
End: 2022-06-30
Payer: COMMERCIAL

## 2022-06-30 DIAGNOSIS — Z98.890 S/P ORIF (OPEN REDUCTION INTERNAL FIXATION) FRACTURE: Primary | ICD-10-CM

## 2022-06-30 DIAGNOSIS — Z87.81 S/P ORIF (OPEN REDUCTION INTERNAL FIXATION) FRACTURE: Primary | ICD-10-CM

## 2022-06-30 PROCEDURE — 97110 THERAPEUTIC EXERCISES: CPT | Performed by: PHYSICAL THERAPIST

## 2022-06-30 PROCEDURE — 97112 NEUROMUSCULAR REEDUCATION: CPT | Performed by: PHYSICAL THERAPIST

## 2022-06-30 PROCEDURE — 97140 MANUAL THERAPY 1/> REGIONS: CPT | Performed by: PHYSICAL THERAPIST

## 2022-06-30 NOTE — PROGRESS NOTES
Daily Note     Today's date: 2022  Patient name: Frantz Armendariz  : 1960  MRN: [id number]  Referring provider: Koko Berkowitz PA-C  Dx:   Encounter Diagnosis     ICD-10-CM    1  S/P ORIF (open reduction internal fixation) fracture  Z98 890     Z87 81                   Subjective: Pt presents to therapy with minimal pain  Pt began 93% PWB and has been ambulating with a 4 point cane  Pt states she had slight discomfort in lateral aspect of foot and ankle and suspects it is from how she was resting the day before  Objective: See treatment diary below      Assessment: Tolerated treatment well  Tenderness along lateral aspect of foot  Introduced self gastroc stretch to add to HEP to improve dorsiflexion  Using biodex, pt experimented with FWB and noted only slight discomfort, but no pain  90% PWB did not produce any pain/discomfort  Patient demonstrated fatigue post treatment, exhibited good technique with therapeutic exercises and would benefit from continued PT      Plan: Progress treatment as tolerated         Precautions: n/a  Daily Treatment Diary     Date        FOTO IE    perf        Re-eval IE                Manuals             PROM ankle  MR TE MR  MR MR       Talocrural PA mob  MR gr 3-4 TE glides MR gr 3-4  MR gr 3-4 PA/AP MR gr 3-4                                 Neuro Re-Ed     Toe curls  30x towel 30x towel 30x Towel  30x towel  30x towel       BIODEX-ws  30x 50% nv 30x 75% 30x 75% 30x 90%                                              Ther Ex    Rockerboard  F/b, side, circles   Seated  30x BAPS x20 ea BAPS  x20 ea BAPS x30 ea  BAPS  x30 ea       Ankle AROM all  10x ea x30 x30 x30 x30       Ankle circles  30x CW  /CCW 30x CW  /CCW 30xCW/CCW 30x CW  /CCW 30xCW /CCW       Ankle ABCs  nv x1 x1 x1 x1       TB ankle all  nv Pink tb x10 ea Pink tb   x30 ea  Green x30 ea  Green   x30 ea        LAQ  3# x15 3# 5"x20 3# 5"x20 3# 5"x30 3# 5"x30       Supine gastroc str 30"x3                    Ther Activity    bike  nv L1x5' L1x5' L1x 5' L1x5'                    Gait Training                              Modalities    CP PRN                            Pt was instructed through exercises and manual therapy with NABIL Alvarez, under direct supervision of Sage John, PT, DPT

## 2022-07-06 ENCOUNTER — OFFICE VISIT (OUTPATIENT)
Dept: PHYSICAL THERAPY | Facility: REHABILITATION | Age: 62
End: 2022-07-06
Payer: COMMERCIAL

## 2022-07-06 DIAGNOSIS — S82.832A OTHER CLOSED FRACTURE OF DISTAL END OF LEFT FIBULA, INITIAL ENCOUNTER: ICD-10-CM

## 2022-07-06 DIAGNOSIS — Z98.890 S/P ORIF (OPEN REDUCTION INTERNAL FIXATION) FRACTURE: Primary | ICD-10-CM

## 2022-07-06 DIAGNOSIS — Z87.81 S/P ORIF (OPEN REDUCTION INTERNAL FIXATION) FRACTURE: Primary | ICD-10-CM

## 2022-07-06 PROCEDURE — 97112 NEUROMUSCULAR REEDUCATION: CPT

## 2022-07-06 PROCEDURE — 97140 MANUAL THERAPY 1/> REGIONS: CPT

## 2022-07-06 PROCEDURE — 97110 THERAPEUTIC EXERCISES: CPT

## 2022-07-06 NOTE — PROGRESS NOTES
Daily Note     Today's date: 2022  Patient name: Sharee Ackerman  : 1960  MRN: [id number]  Referring provider: Renea Stout PA-C  Dx:   Encounter Diagnosis     ICD-10-CM    1  S/P ORIF (open reduction internal fixation) fracture  Z98 890     Z87 81    2  Other closed fracture of distal end of left fibula, initial encounter  X48 379B                   Subjective:  Patient reports that her L ankle is sore and a bit swollen from a lot of extra walking over the weekend  Objective: See treatment diary below      Assessment: Patient tolerated treatment well  Patient performed ex and received manual therapy as noted  L ankle stiffness was noted initially however ROM improved during manual therapy and exercises performed   + lateral ankle edema noted  Patient is wearing compressions socks to help manage ankle edema  Adjusted the patient's cane to the appropriate height with improved gait posturing noted  Patient would benefit from continued PT intervention to address deficits and attain set goals  Plan: Continue per plan of care        Precautions: n/a  Daily Treatment Diary     Date       FOTO IE    perf        Re-eval IE                Manuals             PROM ankle  MR TE MR  MR MR CC      Talocrural PA mob  MR gr 3-4 TE glides MR gr 3-4  MR gr 3-4 PA/AP MR gr 3-4 CC glides                                Neuro Re-Ed     Toe curls  30x towel 30x towel 30x Towel  30x towel  30x towel 30x towel      BIODEX-ws  30x 50% nv 30x 75% 30x 75% 30x 90% 1' ea                                             Ther Ex    Rockerboard  F/b, side, circles   Seated  30x BAPS x20 ea BAPS  x20 ea BAPS x30 ea  BAPS  x30 ea BAPS  x30 ea      Ankle AROM all  10x ea x30 x30 x30 x30 x30      Ankle circles  30x CW  /CCW 30x CW  /CCW 30xCW/CCW 30x CW  /CCW 30xCW /CCW 30x CW  /CCW      Ankle ABCs  nv x1 x1 x1 x1 x1      TB ankle all  nv Pink tb x10 ea Pink tb   x30 ea  Green x30 ea  Green x30 ea  Green  x30 ea      LAQ  3# x15 3# 5"x20 3# 5"x20 3# 5"x30 3# 5"x30 3# 5" x30 4#  3#     Supine gastroc str      30"x3 30"x3                   Ther Activity    bike  nv L1x5' L1x5' L1x 5' L1x5' L1 x5'                   Gait Training                              Modalities    CP PRN

## 2022-07-08 ENCOUNTER — APPOINTMENT (OUTPATIENT)
Dept: PHYSICAL THERAPY | Facility: REHABILITATION | Age: 62
End: 2022-07-08
Payer: COMMERCIAL

## 2022-07-12 ENCOUNTER — APPOINTMENT (OUTPATIENT)
Dept: RADIOLOGY | Facility: AMBULARY SURGERY CENTER | Age: 62
End: 2022-07-12
Attending: ORTHOPAEDIC SURGERY
Payer: COMMERCIAL

## 2022-07-12 ENCOUNTER — OFFICE VISIT (OUTPATIENT)
Dept: OBGYN CLINIC | Facility: CLINIC | Age: 62
End: 2022-07-12

## 2022-07-12 VITALS
WEIGHT: 165 LBS | BODY MASS INDEX: 27.49 KG/M2 | DIASTOLIC BLOOD PRESSURE: 76 MMHG | HEIGHT: 65 IN | HEART RATE: 68 BPM | SYSTOLIC BLOOD PRESSURE: 120 MMHG

## 2022-07-12 DIAGNOSIS — S82.832D OTHER CLOSED FRACTURE OF DISTAL END OF LEFT FIBULA WITH ROUTINE HEALING, SUBSEQUENT ENCOUNTER: Primary | ICD-10-CM

## 2022-07-12 DIAGNOSIS — S82.832A OTHER CLOSED FRACTURE OF DISTAL END OF LEFT FIBULA, INITIAL ENCOUNTER: ICD-10-CM

## 2022-07-12 PROCEDURE — 73610 X-RAY EXAM OF ANKLE: CPT

## 2022-07-12 PROCEDURE — 99024 POSTOP FOLLOW-UP VISIT: CPT | Performed by: ORTHOPAEDIC SURGERY

## 2022-07-12 NOTE — PATIENT INSTRUCTIONS
Then 2 weeks of weightbearing as tolerated in the CAM boot  You may begin weaning your boot and transitioning to a sneaker (7/26)  It is important to do this gradually to avoid aggravating the healing process  7/26, you may come out of the boot into a sneaker for 2 hours  2  7/27, you may come out of the boot into a sneaker for 4 hours,  3  The next day, you may come out of the boot into a sneaker for 6 hours  4  Continue this (adding 2 hours per day) as you tolerate  For example, if you do 6 hours out of the boot into a sneaker and your foot swells more than usual at night and it is difficult to control the discomfort, do not advance to 8 hours the next day, stay at 6 hours until you are able to tolerate it  It is essential to follow this protocol and not modify this  No matter when you begin weaning the boot, it will be difficult and there will be swelling and soreness  If you spend more time than is recommended in the boot, this process becomes longer and more painful  Elevation, Ice and tylenol and staying off of it at night will be important to aide in this transition out of the boot  Swelling and soreness are normal as you begin to do more with the injured leg  May DC aspirin/lovenox, no longer needed  May shower  Continue PT  Scar massage- pea sized amount of lotion, massage into scar for 5 minutes each day  Compression stocking (Knee high, 20-30mm Hg) to be worn at all times while awake  Recommend taking the following supplements: Vitamin D 4000 units per day and Calcium 1200 mg per day  This will help with bone healing

## 2022-07-12 NOTE — PROGRESS NOTES
DONNIE Lal  Attending, Orthopaedic Surgery  Foot and Ankle  [facility]      ORTHOPAEDIC FOOT AND ANKLE POST-OP VISIT     Procedure:     Open reduction internal fixation left lateral malleolus fracture       Date of surgery:     5/23/22      PLAN  1  Weightbearing Status- WBAT operative extremity will begin weaning out per protocol as noted in AVS  2  DVT prophylaxis- complete  3  Compression stockings recommended  4  Pain control- OTC pain medication  5  RTC in 6 week(s)  6  Xrays needed next visit - yes Weightbearing Ankle    History of Present Illness:   Chief Complaint:   Chief Complaint   Patient presents with    Left Ankle - Post-op     Brandy Henriquez is a 62 y o  female who is being seen for post-operative visit for the above procedure  Pain is well controlled and the patient has successfully transitioned to OTC pain medicines  she is taking ASA 325mg BID for DVT prophylaxis but just completed it  Patient has been WBAT in a CAM boot  Review of Systems:  General- denies fever/chills  Respiratory- denies cough or SOB  Cardio- denies chest pain or palpitations  GI- denies abdominal pain  Musculoskeletal- Negative except noted above  Skin- denies rashes or wounds    Physical Exam:   /76 (BP Location: Left arm, Patient Position: Sitting, Cuff Size: Adult)   Pulse 68   Ht 5' 5" (1 651 m)   Wt 74 8 kg (165 lb)   BMI 27 46 kg/m²   General/Constitutional: No apparent distress: well-nourished and well developed  Eyes: normal ocular motion  Lymphatic: No appreciable lymphadenopathy  Respiratory: Non-labored breathing  Vascular: No edema, swelling or tenderness, except as noted in detailed exam   Integumentary: No impressive skin lesions present, except as noted in detailed exam   Neuro: No ataxia or tremors noted  Psych: Normal mood and affect, oriented to person, place and time  Appropriate affect    Musculoskeletal: Normal, except as noted in detailed exam and in HPI     Examination    left        Incision Clean, dry, intact well healed  Sutures Previously removed  Ecchymosis none    Swelling Mild  ROM WNL, as expected    Sensation Intact to light touch throughout sural, saphenous, superficial peroneal, deep peroneal and medial/lateral plantar nerve distributions  [place] 5 07 filament (10g) testing deferred  Cardiovascular Brisk capillary refill < 2 seconds,intact DP and PT pulses    Special Tests None      Imaging Studies:   3 weightbearing views of the left ankle were taken, reviewed and interpreted independently that demonstrate healing stable distal fibular fracture, no hardware complication  Reviewed by me personally  Marvina Ling Lachman, MD  Foot & Ankle Surgery   Department of [address] [place] [address]      I personally performed the service  Marvina Ling Lachman, MD

## 2022-07-13 ENCOUNTER — OFFICE VISIT (OUTPATIENT)
Dept: PHYSICAL THERAPY | Facility: REHABILITATION | Age: 62
End: 2022-07-13
Payer: COMMERCIAL

## 2022-07-13 DIAGNOSIS — Z98.890 S/P ORIF (OPEN REDUCTION INTERNAL FIXATION) FRACTURE: Primary | ICD-10-CM

## 2022-07-13 DIAGNOSIS — Z87.81 S/P ORIF (OPEN REDUCTION INTERNAL FIXATION) FRACTURE: Primary | ICD-10-CM

## 2022-07-13 DIAGNOSIS — S82.832A OTHER CLOSED FRACTURE OF DISTAL END OF LEFT FIBULA, INITIAL ENCOUNTER: ICD-10-CM

## 2022-07-13 PROCEDURE — 97140 MANUAL THERAPY 1/> REGIONS: CPT

## 2022-07-13 PROCEDURE — 97112 NEUROMUSCULAR REEDUCATION: CPT

## 2022-07-13 PROCEDURE — 97110 THERAPEUTIC EXERCISES: CPT

## 2022-07-13 NOTE — PROGRESS NOTES
Daily Note     Today's date: 2022  Patient name: Vesta Del Cid  : 1960  MRN: [id number]  Referring provider: Maco Ellis PA-C  Dx:   Encounter Diagnosis     ICD-10-CM    1  S/P ORIF (open reduction internal fixation) fracture  Z98 890     Z87 81    2  Other closed fracture of distal end of left fibula, initial encounter  L60 258O                   Subjective: pt presents to clinic with CAM boot and no AD  She reported her ankle is feeling pretty good  Pt reports returning to surgeon yesterday in which he was pleased with her progress  She stated she was instructed by surgeon to begin slowly weaning from boot on   Objective: See treatment diary below      Assessment: Tolerated treatment well and without complaints  PROM progressing nicely; limited in DF  Good response with addition of mini squats today  Good challenge exhibited with exercises, particularly biodex activity  Patient demonstrated fatigue post treatment, exhibited good technique with therapeutic exercises and would benefit from continued PT      Plan: Continue per plan of care  Progress treatment as tolerated         Precautions: n/a  Daily Treatment Diary     Date      FOTO IE    perf        Re-eval IE                Manuals             PROM ankle  MR TE MR  MR MR CC TE     Talocrural PA mob  MR gr 3-4 TE glides MR gr 3-4  MR gr 3-4 PA/AP MR gr 3-4 CC glides TE glides                               Neuro Re-Ed     Toe curls  30x towel 30x towel 30x Towel  30x towel  30x towel 30x towel      BIODEX-ws  30x 50% nv 30x 75% 30x 75% 30x 90% 1' ea 30 hits x1 ea                                            Ther Ex    Rockerboard  F/b, side, circles   Seated  30x BAPS x20 ea BAPS  x20 ea BAPS x30 ea  BAPS  x30 ea BAPS  x30 ea BAPS  x30 ea     Ankle AROM all  10x ea x30 x30 x30 x30 x30      Ankle circles  30x CW  /CCW 30x CW  /CCW 30xCW/CCW 30x CW  /CCW 30xCW /CCW 30x CW  /CCW 30x CW  /CCW     Ankle ABCs  nv x1 x1 x1 x1 x1 x1     TB ankle all  nv Pink tb x10 ea Pink tb   x30 ea  Green x30 ea  Green   x30 ea  Green  x30 ea Green  x30 ea     LAQ  3# x15 3# 5"x20 3# 5"x20 3# 5"x30 3# 5"x30 3# 5" x30 4#  3#     Supine gastroc str      30"x3 30"x3 30"x3     Mini squats        2x10     Ther Activity    bike  nv L1x5' L1x5' L1x 5' L1x5' L1 x5' L1x5'                  Gait Training                              Modalities    CP PRN

## 2022-07-19 ENCOUNTER — OFFICE VISIT (OUTPATIENT)
Dept: PHYSICAL THERAPY | Facility: REHABILITATION | Age: 62
End: 2022-07-19
Payer: COMMERCIAL

## 2022-07-19 DIAGNOSIS — S82.832A OTHER CLOSED FRACTURE OF DISTAL END OF LEFT FIBULA, INITIAL ENCOUNTER: ICD-10-CM

## 2022-07-19 DIAGNOSIS — Z87.81 S/P ORIF (OPEN REDUCTION INTERNAL FIXATION) FRACTURE: Primary | ICD-10-CM

## 2022-07-19 DIAGNOSIS — Z98.890 S/P ORIF (OPEN REDUCTION INTERNAL FIXATION) FRACTURE: Primary | ICD-10-CM

## 2022-07-19 PROCEDURE — 97140 MANUAL THERAPY 1/> REGIONS: CPT

## 2022-07-19 PROCEDURE — 97110 THERAPEUTIC EXERCISES: CPT

## 2022-07-19 PROCEDURE — 97112 NEUROMUSCULAR REEDUCATION: CPT

## 2022-07-19 NOTE — PROGRESS NOTES
Daily Note     Today's date: 2022  Patient name: Patrick Fuller  : 1960  MRN: [id number]  Referring provider: Kathryn Schmitt PA-C  Dx:   Encounter Diagnosis     ICD-10-CM    1  S/P ORIF (open reduction internal fixation) fracture  Z98 890     Z87 81    2  Other closed fracture of distal end of left fibula, initial encounter  U93 328T                   Subjective: Pt reports that she is doing well today with no complaints of pain  Notes that yesterday she was doing a lot of standing which caused her foot increased pain  Objective: See treatment diary below      Assessment: Tolerated treatment well  Ankle mobility was improved following self stretches and manuals  Increased LAQ weights with no complaints  Pt is also doing well with weight shifting  Patient demonstrated fatigue post treatment, exhibited good technique with therapeutic exercises and would benefit from continued PT      Plan: Continue per plan of care  Progress treatment as tolerated         Precautions: n/a  Daily Treatment Diary     Date     FOTO IE    perf        Re-eval IE                Manuals             PROM ankle  MR TE MR  MR MR CC TE MM    Talocrural PA mob  MR gr 3-4 TE glides MR gr 3-4  MR gr 3-4 PA/AP MR gr 3-4 CC glides TE glides MM glides                              Neuro Re-Ed     Toe curls  30x towel 30x towel 30x Towel  30x towel  30x towel 30x towel      BIODEX-ws  30x 50% nv 30x 75% 30x 75% 30x 90% 1' ea 30 hits x1 ea 30 hits x1 ea                                           Ther Ex    Rockerboard  F/b, side, circles   Seated  30x BAPS x20 ea BAPS  x20 ea BAPS x30 ea  BAPS  x30 ea BAPS  x30 ea BAPS  x30 ea BAPS x30ea    Ankle AROM all  10x ea x30 x30 x30 x30 x30      Ankle circles  30x CW  /CCW 30x CW  /CCW 30xCW/CCW 30x CW  /CCW 30xCW /CCW 30x CW  /CCW 30x CW  /CCW 30x CW  /CCW    Ankle ABCs  nv x1 x1 x1 x1 x1 x1 x1    TB ankle all  nv Pink tb x10 ea Pink tb   x30 ea Green x30 ea  Green   x30 ea  Green  x30 ea Green  x30 ea Green x30 ea    LAQ  3# x15 3# 5"x20 3# 5"x20 3# 5"x30 3# 5"x30 3# 5" x30 4#  3# 4# 30x 5"    Supine gastroc str      30"x3 30"x3 30"x3 30"x3    Mini squats        2x10 2x10    Ther Activity    bike  nv L1x5' L1x5' L1x 5' L1x5' L1 x5' L1x5' L1x5'                 Gait Training                              Modalities    CP PRN

## 2022-07-21 ENCOUNTER — OFFICE VISIT (OUTPATIENT)
Dept: PHYSICAL THERAPY | Facility: REHABILITATION | Age: 62
End: 2022-07-21
Payer: COMMERCIAL

## 2022-07-21 DIAGNOSIS — Z98.890 S/P ORIF (OPEN REDUCTION INTERNAL FIXATION) FRACTURE: Primary | ICD-10-CM

## 2022-07-21 DIAGNOSIS — Z87.81 S/P ORIF (OPEN REDUCTION INTERNAL FIXATION) FRACTURE: Primary | ICD-10-CM

## 2022-07-21 DIAGNOSIS — S82.832A OTHER CLOSED FRACTURE OF DISTAL END OF LEFT FIBULA, INITIAL ENCOUNTER: ICD-10-CM

## 2022-07-21 PROCEDURE — 97140 MANUAL THERAPY 1/> REGIONS: CPT

## 2022-07-21 PROCEDURE — 97110 THERAPEUTIC EXERCISES: CPT

## 2022-07-21 PROCEDURE — 97530 THERAPEUTIC ACTIVITIES: CPT

## 2022-07-21 PROCEDURE — 97112 NEUROMUSCULAR REEDUCATION: CPT

## 2022-07-21 NOTE — PROGRESS NOTES
Daily Note     Today's date: 2022  Patient name: Veronica Aguilera  : 1960  MRN: [id number]  Referring provider: Daniel Markham PA-C  Dx:   Encounter Diagnosis     ICD-10-CM    1  S/P ORIF (open reduction internal fixation) fracture  Z98 890     Z87 81    2  Other closed fracture of distal end of left fibula, initial encounter  R10 396W                   Subjective: Patient reports some soreness at this time, no major c/o pain at this time  Objective: See treatment diary below      Assessment: Tolerated treatment well  Patient to start weaning from boot 22    Plan: Continue per plan of care        Precautions: n/a  Daily Treatment Diary     Date    FOTO IE    perf        Re-eval IE                Manuals             PROM ankle  MR TE MR  MR MR CC TE MM HA   Talocrural PA mob  MR gr 3-4 TE glides MR gr 3-4  MR gr 3-4 PA/AP MR gr 3-4 CC glides TE glides MM glides                              Neuro Re-Ed     Toe curls  30x towel 30x towel 30x Towel  30x towel  30x towel 30x towel      BIODEX-ws  30x 50% nv 30x 75% 30x 75% 30x 90% 1' ea 30 hits x1 ea 30 hits x1 ea 30 hits x1 ea                                          Ther Ex    Rockerboard  F/b, side, circles   Seated  30x BAPS x20 ea BAPS  x20 ea BAPS x30 ea  BAPS  x30 ea BAPS  x30 ea BAPS  x30 ea BAPS x30ea BAPS x30ea    Ankle AROM all  10x ea x30 x30 x30 x30 x30      Ankle circles  30x CW  /CCW 30x CW  /CCW 30xCW/CCW 30x CW  /CCW 30xCW /CCW 30x CW  /CCW 30x CW  /CCW 30x CW  /CCW 30x cw/ccw   Ankle ABCs  nv x1 x1 x1 x1 x1 x1 x1 x1   TB ankle all  nv Pink tb x10 ea Pink tb   x30 ea  Green x30 ea  Green   x30 ea  Green  x30 ea Green  x30 ea Green x30 ea Green x30 ea   LAQ  3# x15 3# 5"x20 3# 5"x20 3# 5"x30 3# 5"x30 3# 5" x30 4#  3# 4# 30x 5" 4# 30x 5"   Supine gastroc str      30"x3 30"x3 30"x3 30"x3 30"x3   Mini squats        2x10 2x10 2x10   Ther Activity    bike  nv L1x5' L1x5' L1x 5' L1x5' L1 x5' L1x5' L1x5' L1 x5'                Gait Training                              Modalities    CP PRN

## 2022-07-26 ENCOUNTER — OFFICE VISIT (OUTPATIENT)
Dept: PHYSICAL THERAPY | Facility: REHABILITATION | Age: 62
End: 2022-07-26
Payer: COMMERCIAL

## 2022-07-26 DIAGNOSIS — Z87.81 S/P ORIF (OPEN REDUCTION INTERNAL FIXATION) FRACTURE: Primary | ICD-10-CM

## 2022-07-26 DIAGNOSIS — Z98.890 S/P ORIF (OPEN REDUCTION INTERNAL FIXATION) FRACTURE: Primary | ICD-10-CM

## 2022-07-26 PROCEDURE — 97112 NEUROMUSCULAR REEDUCATION: CPT | Performed by: PHYSICAL THERAPIST

## 2022-07-26 PROCEDURE — 97110 THERAPEUTIC EXERCISES: CPT | Performed by: PHYSICAL THERAPIST

## 2022-07-26 NOTE — PROGRESS NOTES
Daily Note     Today's date: 2022  Patient name: Nakul Thompson  : 1960  MRN: [id number]  Referring provider: Doyle Caro PA-C  Dx:   Encounter Diagnosis     ICD-10-CM    1  S/P ORIF (open reduction internal fixation) fracture  Z98 890     Z87 81                   Subjective: Pt presents to therapy with minimal pain  Pt ambulates with CAM boot, but brought sneaker since today is the first day to transition from CAM boot to sneaker (2 hours)  Pt demonstrates understanding of Boot protocol set by surgeon  Pt stated she attempted climbing up and down a small stair case yesterday and was able to perform, but with fear due to being the place she was originally injured  Pt states she also spent the weekend vacuuming her cousins house with her CAM boot on and it was sore for a little afterwards       Objective: See treatment diary below      Assessment: Tolerated treatment well  Pt began to transition from CAM boot to sneaker  Pt initially had difficulty ambulating without boot with step-to gait pattern, but quickly felt more comfortable and began ambulating with step through gait  Pt tolerated new WBing exercises very well with minor soreness/discomfort  Pt was able to step up and down b/l with minimal pain or restriction  Pt was advised to continue [address] with sneaker for the rest of the 2 hour period as well as ice back at the office  Pt was also given new green tb for HEP  Patient demonstrated fatigue post treatment, exhibited good technique with therapeutic exercises and would benefit from continued PT      Plan: Progress treatment as tolerated         Precautions: n/a  Daily Treatment Diary     Date    FOTO     perf        Re-eval                 Manuals             PROM ankle np  TE MR  MR MR CC TE MM HA   Talocrural PA mob np  TE glides MR gr 3-4  MR gr 3-4 PA/AP MR gr 3-4 CC glides TE glides MM glides                              Neuro Re-Ed     Toe curls HEP  30x towel 30x Towel  30x towel  30x towel 30x towel      BIODEX-ws  30 hits x1 ea (fwd, side, diag)  nv 30x 75% 30x 75% 30x 90% 1' ea 30 hits x1 ea 30 hits x1 ea 30 hits x1 ea   Step ups/downs 0R x10 ea  1R           Tandem stance  nv            SLS nv            Ther Ex    Rockerboard  F/b, side, circles  np   BAPS x20 ea BAPS  x20 ea BAPS x30 ea  BAPS  x30 ea BAPS  x30 ea BAPS  x30 ea BAPS x30ea BAPS x30ea    Ankle AROM all HEP  x30 x30 x30 x30 x30      Ankle circles HEP  30x CW  /CCW 30xCW/CCW 30x CW  /CCW 30xCW /CCW 30x CW  /CCW 30x CW  /CCW 30x CW  /CCW 30x cw/ccw   Ankle ABCs HEP  x1 x1 x1 x1 x1 x1 x1 x1   TB ankle all Green 30x ea   Pink tb x10 ea Pink tb   x30 ea  Green x30 ea  Green   x30 ea  Green  x30 ea Green  x30 ea Green x30 ea Green x30 ea   LAQ 4# 30x 5"  3# 5"x20 3# 5"x20 3# 5"x30 3# 5"x30 3# 5" x30 4#  3# 4# 30x 5" 4# 30x 5"   Supine gastroc str Stand   30"x3     30"x3 30"x3 30"x3 30"x3 30"x3   Mini squats 2x10        2x10 2x10 2x10   Ther Activity    bike L1x5'  L1x5' L1x5' L1x 5' L1x5' L1 x5' L1x5' L1x5' L1 x5'                Gait Training                              Modalities    CP PRN                            Pt was instructed through exercises with Bunny Iqbal, SPT, under direct supervision of Carole Man, PT, DPT

## 2022-07-27 ENCOUNTER — APPOINTMENT (OUTPATIENT)
Dept: PHYSICAL THERAPY | Facility: REHABILITATION | Age: 62
End: 2022-07-27
Payer: COMMERCIAL

## 2022-07-28 ENCOUNTER — EVALUATION (OUTPATIENT)
Dept: PHYSICAL THERAPY | Facility: REHABILITATION | Age: 62
End: 2022-07-28
Payer: COMMERCIAL

## 2022-07-28 DIAGNOSIS — Z98.890 S/P ORIF (OPEN REDUCTION INTERNAL FIXATION) FRACTURE: Primary | ICD-10-CM

## 2022-07-28 DIAGNOSIS — S82.832A OTHER CLOSED FRACTURE OF DISTAL END OF LEFT FIBULA, INITIAL ENCOUNTER: ICD-10-CM

## 2022-07-28 DIAGNOSIS — Z87.81 S/P ORIF (OPEN REDUCTION INTERNAL FIXATION) FRACTURE: Primary | ICD-10-CM

## 2022-07-28 PROCEDURE — 97140 MANUAL THERAPY 1/> REGIONS: CPT | Performed by: PHYSICAL THERAPIST

## 2022-07-28 PROCEDURE — 97110 THERAPEUTIC EXERCISES: CPT | Performed by: PHYSICAL THERAPIST

## 2022-07-28 PROCEDURE — 97112 NEUROMUSCULAR REEDUCATION: CPT | Performed by: PHYSICAL THERAPIST

## 2022-07-28 NOTE — PROGRESS NOTES
PT Re-Evaluation     Today's date: 2022  Patient name: Stiven Reid  : 1960  MRN: [id number]  Referring provider: Nick Barajas PA-C  Dx:   Encounter Diagnosis     ICD-10-CM    1  S/P ORIF (open reduction internal fixation) fracture  Z98 890     Z87 81    2  Other closed fracture of distal end of left fibula, initial encounter  T24 044J                   Assessment  Assessment details: Stiven Reid has been treated in outpatient physical therapy over the past diagnosis/complaints of S/P ORIF (open reduction internal fixation) fracture  (primary encounter diagnosis)  Other closed fracture of distal end of left fibula, initial encounter  Pt demonstrates increased range of motion, improved strength, decreased pain, and increased activity tolerance  Pt continues to present with decreased dorsiflexionrange of motion, decreased strength, decreased balance and stability about the ankle, abnormal gait mechanics, and decreased tolerance to activity  Pt would continue to benefit from skilled physical therapy to address limitations and to achieve goals  Thank you for this referral   Impairments: abnormal coordination, abnormal gait, abnormal or restricted ROM, activity intolerance, impaired balance, impaired physical strength, pain with function and weight-bearing intolerance  Understanding of Dx/Px/POC: good   Prognosis: good    Goals  ST  Patient will be able to ambulate throughout home with [facility] and outside 250 feet independently in 4 weeks  - MET  2  Patient will demonstrate 25% improvement in ROM in 4 weeks  - MET  3  Patient will demonstrate 1/2 grade improvement in strength in 4 weeks  - MET    LT  Patient will be able to perform IADLS without restriction or pain by discharge  2  Patient will be independent in HEP by discharge  3  Patient will be able to return to recreational/work duties without restriction or pain by discharge        Plan  Patient would benefit from: PT eval and skilled PT  Planned modality interventions: cryotherapy and thermotherapy: hydrocollator packs  Planned therapy interventions: IADL retraining, body mechanics training, flexibility, functional ROM exercises, home exercise program, neuromuscular re-education, manual therapy, postural training, strengthening, stretching, therapeutic activities, therapeutic exercise, joint mobilization, balance/weight bearing training and patient education  Frequency: 2x week  Duration in visits: 8  Duration in weeks: 4  Treatment plan discussed with: patient        Subjective Evaluation    History of Present Illness  Mechanism of injury: 06/13/22  Pt comes to therapy reporting fall down her basement stairs 4/28/22, resulting in fracture to L ankle  States she had subsequent surgery on 5/23/22, placed in a cast, and is NWB with RW  Reports she has had minimal to no pain in her LLE since the surgery  Notes she elevates LE for edema control  Notes she lives alone in a ranch home, with one step to enter through garage, but no steps to enter through front door  Reports she has follow up with physician tomorrow  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 [employer]  Reports she had been going for 1 5 mile walks almost every day of the week prior to the injury  GOALS: return to walking recreationally eventually  07/28/22  Pt ambulates into clinic today in street sneaker  States she has been working on adjusting to sneaker over the past few days  Notes she was able to climb her stairs twice in reciprocal pattern  Reports her remaining goals are to restore normal gait and balance, as well as endurance and ability to walk for long distances    Pain  No pain reported    Patient Goals  Patient goals for therapy: decreased pain, increased motion, improved balance, increased strength and independence with ADLs/IADLs          Objective     Active Range of Motion   Left Ankle/Foot   Dorsiflexion (ke): 5 degrees   Plantar flexion: 48 degrees   Inversion: 28 degrees   Eversion: 12 degrees     Passive Range of Motion   Left Ankle/Foot    Dorsiflexion (ke): 10 degrees   Dorsiflexion (kf): 8 degrees   Plantar flexion: 55 degrees   Inversion: 36 degrees   Eversion: 22 degrees     Strength/Myotome Testing     Left Hip   Planes of Motion   Flexion: 4-  Abduction: 4  Adduction: 4+  External rotation: 4+  Internal rotation: 4+    Right Hip   Planes of Motion   Flexion: 4  Abduction: 4  Adduction: 4+  External rotation: 4+  Internal rotation: 4+    Left Knee   Flexion: 4+  Extension: 4-    Right Knee   Flexion: 4+  Extension: 4+    Left Ankle/Foot   Dorsiflexion: 4+  Plantar flexion: 4+  Inversion: 4+  Eversion: 4+    Tests     Additional Tests Details  06/13/22  A/PROM to be assessed NV/upon removal of cast    07/28/22  Gait - decreased stance time on LLE, increased eversion  SLS - decreased stability on LLE, UE support    Swelling   Left Ankle/Foot   Figure 8: 52 5 cm             Precautions: n/a  Daily Treatment Diary     Date 7/26 7/28 6/23 6/27 6/30 7/6 7/13 7/19 7/21   FOTO  nv   perf        Re-eval  WAQAR               Manuals             PROM ankle np WAQAR  MR  MR MR CC TE MM HA   Talocrural PA mob np   MR gr 3-4  MR gr 3-4 PA/AP MR gr 3-4 CC glides TE glides MM glides                              Neuro Re-Ed     Toe curls HEP   30x Towel  30x towel  30x towel 30x towel      BIODEX-ws  30 hits x1 ea (fwd, side, diag) Foam 30 hits x1 ea  30x 75% 30x 75% 30x 90% 1' ea 30 hits x1 ea 30 hits x1 ea 30 hits x1 ea   BIODEX - LOS  Foam x3           Step ups/downs 0R x10 ea  1R nv           Tandem stance  nv 30"x1           SLS nv 30"x2 ea           Clock drill  nv                                                  Ther Ex    Rockerboard  F/b, side, circles  np  nv stand  BAPS  x20 ea BAPS x30 ea  BAPS  x30 ea BAPS  x30 ea BAPS  x30 ea BAPS x30ea BAPS x30ea    Ankle AROM all HEP   x30 x30 x30 x30      Ankle circles HEP   30xCW/CCW 30x CW  /CCW 30xCW /CCW 30x CW  /CCW 30x CW  /CCW 30x CW  /CCW 30x cw/ccw   Ankle ABCs HEP   x1 x1 x1 x1 x1 x1 x1   TB ankle all Green 30x ea  D/C  Pink tb   x30 ea  Green x30 ea  Green   x30 ea  Green  x30 ea Green  x30 ea Green x30 ea Green x30 ea   LAQ 4# 30x 5" D/C  3# 5"x20 3# 5"x30 3# 5"x30 3# 5" x30 4#  3# 4# 30x 5" 4# 30x 5"   Supine gastroc str Stand   30"x3 Stand   30"x3    30"x3 30"x3 30"x3 30"x3 30"x3   Mini squats 2x10  nv                                     Ther Activity    bike L1x5' L1x8'  L1x5' L1x 5' L1x5' L1 x5' L1x5' L1x5' L1 x5'   Step ups - fwd, dwn  nv                        Gait Training                              Modalities    CP PRN

## 2022-08-02 ENCOUNTER — OFFICE VISIT (OUTPATIENT)
Dept: PHYSICAL THERAPY | Facility: REHABILITATION | Age: 62
End: 2022-08-02
Payer: COMMERCIAL

## 2022-08-02 DIAGNOSIS — Z87.81 S/P ORIF (OPEN REDUCTION INTERNAL FIXATION) FRACTURE: Primary | ICD-10-CM

## 2022-08-02 DIAGNOSIS — Z98.890 S/P ORIF (OPEN REDUCTION INTERNAL FIXATION) FRACTURE: Primary | ICD-10-CM

## 2022-08-02 DIAGNOSIS — S82.832A OTHER CLOSED FRACTURE OF DISTAL END OF LEFT FIBULA, INITIAL ENCOUNTER: ICD-10-CM

## 2022-08-02 PROCEDURE — 97140 MANUAL THERAPY 1/> REGIONS: CPT

## 2022-08-02 PROCEDURE — 97110 THERAPEUTIC EXERCISES: CPT

## 2022-08-02 PROCEDURE — 97112 NEUROMUSCULAR REEDUCATION: CPT

## 2022-08-02 NOTE — PROGRESS NOTES
Daily Note     Today's date: 2022  Patient name: Luba Rivero  : 1960  MRN: [id number]  Referring provider: Yojana Mendieta PA-C  Dx:   Encounter Diagnosis     ICD-10-CM    1  S/P ORIF (open reduction internal fixation) fracture  Z98 890     Z87 81    2  Other closed fracture of distal end of left fibula, initial encounter  N57 339N                   Subjective: pt reports increased activity with walking and stair climbing within the past 2 days with little difficulty and discomfort  She currently reported her ankle feels pretty good  Objective: See treatment diary below      Assessment: Tolerated treatment well and without complaints  Good tolerance with progressions  Most difficulty with balance and proprioception exercises  Patient demonstrated fatigue post treatment, exhibited good technique with therapeutic exercises and would benefit from continued PT  Plan: Continue per plan of care  Progress treatment as tolerated         Precautions: n/a  Daily Treatment Diary     Date    FOTO  nv   perf        Re-eval  WAQAR               Manuals             PROM ankle np WAQAR TE MR  MR MR CC TE MM HA   Talocrural PA mob np   MR gr 3-4  MR gr 3-4 PA/AP MR gr 3-4 CC glides TE glides MM glides                              Neuro Re-Ed     Toe curls HEP   30x Towel  30x towel  30x towel 30x towel      BIODEX-ws  30 hits x1 ea (fwd, side, diag) Foam 30 hits x1 ea Foam 30 hits x1 ea 30x 75% 30x 75% 30x 90% 1' ea 30 hits x1 ea 30 hits x1 ea 30 hits x1 ea   BIODEX - LOS  Foam x3 Foam x4          Step ups/downs 0R x10 ea  1R nv 1R 2x10/x10          Tandem stance  nv 30"x1 30"x2 ea          SLS nv 30"x2 ea 30"x2 ea          Clock drill  nv x5                                                  Ther Ex    Rockerboard  F/b, side, circles  np  nv stand x10, bal 30"x1 BAPS  x20 ea BAPS x30 ea  BAPS  x30 ea BAPS  x30 ea BAPS  x30 ea BAPS x30ea BAPS x30ea    Ankle AROM all HEP x30 x30 x30 x30      Ankle circles HEP   30xCW/CCW 30x CW  /CCW 30xCW /CCW 30x CW  /CCW 30x CW  /CCW 30x CW  /CCW 30x cw/ccw   Ankle ABCs HEP   x1 x1 x1 x1 x1 x1 x1   TB ankle all Green 30x ea  D/C  Pink tb   x30 ea  Green x30 ea  Green   x30 ea  Green  x30 ea Green  x30 ea Green x30 ea Green x30 ea   LAQ 4# 30x 5" D/C  3# 5"x20 3# 5"x30 3# 5"x30 3# 5" x30 4#  3# 4# 30x 5" 4# 30x 5"   Supine gastroc str Stand   30"x3 Stand   30"x3 Stand   30"x3   30"x3 30"x3 30"x3 30"x3 30"x3   Mini squats 2x10  nv 2x10                                    Ther Activity    bike L1x5' L1x8' L1x8' L1x5' L1x 5' L1x5' L1 x5' L1x5' L1x5' L1 x5'   Step ups - fwd, dwn  nv                        Gait Training                              Modalities    CP PRN

## 2022-08-04 ENCOUNTER — OFFICE VISIT (OUTPATIENT)
Dept: PHYSICAL THERAPY | Facility: REHABILITATION | Age: 62
End: 2022-08-04
Payer: COMMERCIAL

## 2022-08-04 DIAGNOSIS — Z98.890 S/P ORIF (OPEN REDUCTION INTERNAL FIXATION) FRACTURE: Primary | ICD-10-CM

## 2022-08-04 DIAGNOSIS — S82.832A OTHER CLOSED FRACTURE OF DISTAL END OF LEFT FIBULA, INITIAL ENCOUNTER: ICD-10-CM

## 2022-08-04 DIAGNOSIS — Z87.81 S/P ORIF (OPEN REDUCTION INTERNAL FIXATION) FRACTURE: Primary | ICD-10-CM

## 2022-08-04 PROCEDURE — 97140 MANUAL THERAPY 1/> REGIONS: CPT

## 2022-08-04 PROCEDURE — 97110 THERAPEUTIC EXERCISES: CPT

## 2022-08-04 PROCEDURE — 97112 NEUROMUSCULAR REEDUCATION: CPT

## 2022-08-04 NOTE — PROGRESS NOTES
Daily Note     Today's date: 2022  Patient name: Clemente Yoo  : 1960  MRN: [id number]  Referring provider: Aidan Dougherty PA-C  Dx:   Encounter Diagnosis     ICD-10-CM    1  S/P ORIF (open reduction internal fixation) fracture  Z98 890     Z87 81    2  Other closed fracture of distal end of left fibula, initial encounter  K46 558G                   Subjective: pt reports with c/o soreness in ankle pre-tx  She noted walking stairs yesterday and carried a bath bench downstairs slowly with some difficulty  Objective: See treatment diary below      Assessment: Tolerated treatment well and without complaints  Decreased tightness/soreness reported post manuals  Greatest challenge with clocks and rockerboard activities  Patient demonstrated fatigue post treatment, exhibited good technique with therapeutic exercises and would benefit from continued PT  Plan: Continue per plan of care  Progress treatment as tolerated         Precautions: n/a  Daily Treatment Diary     Date    FOTO  nv   perf        Re-eval  WAQAR               Manuals             PROM ankle np WAQAR TE TE MR MR CC TE MM HA   Talocrural PA mob np   TE gentle glides MR gr 3-4 PA/AP MR gr 3-4 CC glides TE glides MM glides                              Neuro Re-Ed     Toe curls HEP    30x towel  30x towel 30x towel      BIODEX-ws  30 hits x1 ea (fwd, side, diag) Foam 30 hits x1 ea Foam 30 hits x1 ea Foam 30 hits x1 ea 30x 75% 30x 90% 1' ea 30 hits x1 ea 30 hits x1 ea 30 hits x1 ea   BIODEX - LOS  Foam x3 Foam x4 Foam x4         Step ups/downs 0R x10 ea  1R nv 1R 2x10/x10 1R 2x10/  0R 2x10         Tandem stance  nv 30"x1 30"x2 ea 30"x2 ea         SLS nv 30"x2 ea 30"x2 ea 30"x2 ea         Clock drill  nv x5  x5                                                Ther Ex    Rockerboard  F/b, side, circles  np  nv stand x10, bal 30"x1 X20, bal 30"x1 ea BAPS x30 ea  BAPS  x30 ea BAPS  x30 ea BAPS  x30 ea BAPS x30ea BAPS x30ea    Ankle AROM all HEP    x30 x30 x30      Ankle circles HEP    30x CW  /CCW 30xCW /CCW 30x CW  /CCW 30x CW  /CCW 30x CW  /CCW 30x cw/ccw   Ankle ABCs HEP    x1 x1 x1 x1 x1 x1   TB ankle all Green 30x ea  D/C   Green x30 ea  Green   x30 ea  Green  x30 ea Green  x30 ea Green x30 ea Green x30 ea   LAQ 4# 30x 5" D/C   3# 5"x30 3# 5"x30 3# 5" x30 4#  3# 4# 30x 5" 4# 30x 5"   Supine gastroc str Stand   30"x3 Stand   30"x3 Stand   30"x3 Stand   30"x3  30"x3 30"x3 30"x3 30"x3 30"x3   Mini squats 2x10  nv 2x10 2x10                                   Ther Activity    bike L1x5' L1x8' L1x8' L1x8' L1x 5' L1x5' L1 x5' L1x5' L1x5' L1 x5'   Step ups - fwd, dwn  nv                        Gait Training                              Modalities    CP PRN

## 2022-08-09 ENCOUNTER — OFFICE VISIT (OUTPATIENT)
Dept: PHYSICAL THERAPY | Facility: REHABILITATION | Age: 62
End: 2022-08-09
Payer: COMMERCIAL

## 2022-08-09 DIAGNOSIS — Z87.81 S/P ORIF (OPEN REDUCTION INTERNAL FIXATION) FRACTURE: Primary | ICD-10-CM

## 2022-08-09 DIAGNOSIS — S82.832A OTHER CLOSED FRACTURE OF DISTAL END OF LEFT FIBULA, INITIAL ENCOUNTER: ICD-10-CM

## 2022-08-09 DIAGNOSIS — Z98.890 S/P ORIF (OPEN REDUCTION INTERNAL FIXATION) FRACTURE: Primary | ICD-10-CM

## 2022-08-09 PROCEDURE — 97140 MANUAL THERAPY 1/> REGIONS: CPT

## 2022-08-09 PROCEDURE — 97110 THERAPEUTIC EXERCISES: CPT

## 2022-08-09 PROCEDURE — 97112 NEUROMUSCULAR REEDUCATION: CPT

## 2022-08-09 NOTE — PROGRESS NOTES
Daily Note     Today's date: 2022  Patient name: Chu Kang  : 1960  MRN: [id number]  Referring provider: Priyanka Burr PA-C  Dx:   Encounter Diagnosis     ICD-10-CM    1  S/P ORIF (open reduction internal fixation) fracture  Z98 890     Z87 81    2  Other closed fracture of distal end of left fibula, initial encounter  O06 050M                   Subjective: Pt reports to therapy stating her ankle is a little sore from doing the stairs a lot over the weekend while helping to clean out her cousin's house  Objective: See treatment diary below      Assessment: Pt with good tolerance to treatment  Challenged with static rockerboard balance F/B> M/L  Requires 1 fingertip touch assist for balance with clocks  Slightly decreased volume and intensity of exercises today due to increased soreness, will plan to resume nv  Pt with favorable response to treatment, no adverse response noted  Pt would benefit from continued skilled PT to improve current deficits and maximize function  Plan: Continue per plan of care  Progress treatment as tolerated         Precautions: n/a  Daily Treatment Diary     Date  8/ 8/ 8   FOTO  nv           Re-eval  WAQAR               Manuals             PROM ankle np WAQAR TE TE SC MR CC TE MM HA   Talocrural PA mob np   TE gentle glides SC   glides MR gr 3-4 CC glides TE glides MM glides                              Neuro Re-Ed     Toe curls HEP     30x towel 30x towel      BIODEX-ws  30 hits x1 ea (fwd, side, diag) Foam 30 hits x1 ea Foam 30 hits x1 ea Foam 30 hits x1 ea Foam 30 hits  1x ea 30x 90% 1' ea 30 hits x1 ea 30 hits x1 ea 30 hits x1 ea   BIODEX - LOS  Foam x3 Foam x4 Foam x4 Foam x2        Step ups/downs 0R x10 ea  1R nv 1R 2x10/x10 1R 2x10/  0R 2x10 1R 2x10/  0R  2x10        Tandem stance  nv 30"x1 30"x2 ea 30"x2 ea 30"x1 ea        SLS nv 30"x2 ea 30"x2 ea 30"x2 ea np        Clock drill  nv x5  x5 x5 Ther Ex    Rockerboard  F/b, side, circles  np  nv stand x10, bal 30"x1 X20, bal 30"x1 ea 30"b4mjgzann,  20x  BAPS  x30 ea BAPS  x30 ea BAPS  x30 ea BAPS x30ea BAPS x30ea    Ankle AROM all HEP     x30 x30      Ankle circles HEP     30xCW /CCW 30x CW  /CCW 30x CW  /CCW 30x CW  /CCW 30x cw/ccw   Ankle ABCs HEP     x1 x1 x1 x1 x1   TB ankle all Green 30x ea  D/C    Green   x30 ea  Green  x30 ea Green  x30 ea Green x30 ea Green x30 ea   LAQ 4# 30x 5" D/C    3# 5"x30 3# 5" x30 4#  3# 4# 30x 5" 4# 30x 5"   Supine gastroc str Stand   30"x3 Stand   30"x3 Stand   30"x3 Stand   30"x3 Stand  30"x3 30"x3 30"x3 30"x3 30"x3 30"x3   Mini squats 2x10  nv 2x10 2x10 2x10                                  Ther Activity    bike L1x5' L1x8' L1x8' L1x8' L1x8' L1x5' L1 x5' L1x5' L1x5' L1 x5'   Step ups - fwd, dwn  nv                        Gait Training                              Modalities    CP PRN

## 2022-08-11 ENCOUNTER — OFFICE VISIT (OUTPATIENT)
Dept: PHYSICAL THERAPY | Facility: REHABILITATION | Age: 62
End: 2022-08-11
Payer: COMMERCIAL

## 2022-08-11 DIAGNOSIS — Z98.890 S/P ORIF (OPEN REDUCTION INTERNAL FIXATION) FRACTURE: Primary | ICD-10-CM

## 2022-08-11 DIAGNOSIS — Z87.81 S/P ORIF (OPEN REDUCTION INTERNAL FIXATION) FRACTURE: Primary | ICD-10-CM

## 2022-08-11 DIAGNOSIS — S82.832A OTHER CLOSED FRACTURE OF DISTAL END OF LEFT FIBULA, INITIAL ENCOUNTER: ICD-10-CM

## 2022-08-11 PROCEDURE — 97110 THERAPEUTIC EXERCISES: CPT

## 2022-08-11 PROCEDURE — 97140 MANUAL THERAPY 1/> REGIONS: CPT

## 2022-08-11 PROCEDURE — 97112 NEUROMUSCULAR REEDUCATION: CPT

## 2022-08-11 NOTE — PROGRESS NOTES
Daily Note     Today's date: 2022  Patient name: Vesta Del Cid  : 1960  MRN: [id number]  Referring provider: Maco Ellis PA-C  Dx:   Encounter Diagnosis     ICD-10-CM    1  S/P ORIF (open reduction internal fixation) fracture  Z98 890     Z87 81    2  Other closed fracture of distal end of left fibula, initial encounter  D83 147D                   Subjective: pt reports mild soreness in ankle pre-tx  She noted stairs are getting easier  Objective: See treatment diary below      Assessment: Tolerated treatment well and without complaints  Improved balance and control with dynamic balance exercises  Patient demonstrated fatigue post treatment, exhibited good technique with therapeutic exercises and would benefit from continued PT      Plan: Continue per plan of care  Progress treatment as tolerated         Precautions: n/a  Daily Treatment Diary     Date  8   FOTO  nv           Re-eval  WAQAR               Manuals             PROM ankle np WAQAR TE TE SC TE  TE MM HA   Talocrural PA mob np   TE gentle glides SC   glides TE glides  TE glides MM glides                              Neuro Re-Ed     Toe curls HEP            BIODEX-ws  30 hits x1 ea (fwd, side, diag) Foam 30 hits x1 ea Foam 30 hits x1 ea Foam 30 hits x1 ea Foam 30 hits  1x ea Foam 30 hits  2x ea L12 30 hits x1 ea 30 hits x1 ea 30 hits x1 ea   BIODEX - LOS  Foam x3 Foam x4 Foam x4 Foam x2 Foam x2       Step ups/downs 0R x10 ea  1R nv 1R 2x10/x10 1R 2x10/  0R 2x10 1R 2x10/  0R  2x10 2R 2x10/0R 2x10       Tandem stance  nv 30"x1 30"x2 ea 30"x2 ea 30"x1 ea 30"x2 ea       SLS nv 30"x2 ea 30"x2 ea 30"x2 ea np        Clock drill  nv x5  x5 x5 x5                                              Ther Ex    Rockerboard  F/b, side, circles  np  nv stand x10, bal 30"x1 X20, bal 30"x1 ea 30"w0libaumf,  20x  30"p8baxwqar,  20x  BAPS  x30 ea BAPS x30ea BAPS x30ea    Ankle AROM all HEP            Ankle circles HEP 30x CW  /CCW 30x CW  /CCW 30x cw/ccw   Ankle ABCs HEP       x1 x1 x1   TB ankle all Green 30x ea  D/C      Green  x30 ea Green x30 ea Green x30 ea   LAQ 4# 30x 5" D/C      4#  3# 4# 30x 5" 4# 30x 5"   Supine gastroc str Stand   30"x3 Stand   30"x3 Stand   30"x3 Stand   30"x3 Stand  30"x3 Stand  30"x3  30"x3 30"x3 30"x3   Mini squats 2x10  nv 2x10 2x10 2x10 2x10                                 Ther Activity    bike L1x5' L1x8' L1x8' L1x8' L1x8' L1x5' L1 x5' L1x5' L1x5' L1 x5'   Step ups - fwd, dwn  nv                        Gait Training                              Modalities    CP PRN

## 2022-08-16 ENCOUNTER — OFFICE VISIT (OUTPATIENT)
Dept: PHYSICAL THERAPY | Facility: REHABILITATION | Age: 62
End: 2022-08-16
Payer: COMMERCIAL

## 2022-08-16 DIAGNOSIS — S82.832A OTHER CLOSED FRACTURE OF DISTAL END OF LEFT FIBULA, INITIAL ENCOUNTER: ICD-10-CM

## 2022-08-16 DIAGNOSIS — Z87.81 S/P ORIF (OPEN REDUCTION INTERNAL FIXATION) FRACTURE: Primary | ICD-10-CM

## 2022-08-16 DIAGNOSIS — Z98.890 S/P ORIF (OPEN REDUCTION INTERNAL FIXATION) FRACTURE: Primary | ICD-10-CM

## 2022-08-16 PROCEDURE — 97140 MANUAL THERAPY 1/> REGIONS: CPT

## 2022-08-16 PROCEDURE — 97110 THERAPEUTIC EXERCISES: CPT

## 2022-08-16 PROCEDURE — 97112 NEUROMUSCULAR REEDUCATION: CPT

## 2022-08-16 NOTE — PROGRESS NOTES
Daily Note     Today's date: 2022  Patient name: Fly Simmons  : 1960  MRN: [id number]  Referring provider: Imelda Kumar PA-C  Dx:   Encounter Diagnosis     ICD-10-CM    1  S/P ORIF (open reduction internal fixation) fracture  Z98 890     Z87 81    2  Other closed fracture of distal end of left fibula, initial encounter  U90 777G                   Subjective: pt reports mild soreness and stiffness this morning 2/2 going up and down the stairs at home a lot yesterday cleaning  Objective: See treatment diary below      Assessment: Pt with good tolerance to treatment  Demonstrates increased L ankle/LE fatigue today and challenge with TE, likely due to increased activity over the weekend  Pt with restriction in DF, which improved following TC glides during manuals  Pt would benefit from continued PT to improve current deficits and maximize functional activity tolerance  Plan: Continue per plan of care  Progress treatment as tolerated         Precautions: n/a  Daily Treatment Diary     Date    FOTO  nv           Re-eval  WAQAR               Manuals             PROM ankle np WAQAR TE TE SC TE SC  MM HA   Talocrural PA mob np   TE gentle glides SC   glides TE glides SC  glides  MM glides                              Neuro Re-Ed     Toe curls HEP            BIODEX-ws  30 hits x1 ea (fwd, side, diag) Foam 30 hits x1 ea Foam 30 hits x1 ea Foam 30 hits x1 ea Foam 30 hits  1x ea Foam 30 hits  2x ea L12  30 hits 2x ea  30 hits x1 ea 30 hits x1 ea   BIODEX - LOS  Foam x3 Foam x4 Foam x4 Foam x2 Foam x2 Foam x2      Step ups/downs 0R x10 ea  1R nv 1R 2x10/x10 1R 2x10/  0R 2x10 1R 2x10/  0R  2x10 2R 2x10/0R 2x10 2R 2x10/  0R 2x10 2R/1R     Tandem stance  nv 30"x1 30"x2 ea 30"x2 ea 30"x1 ea 30"x2 ea 30"x2 ea      SLS nv 30"x2 ea 30"x2 ea 30"x2 ea np        Clock drill  nv x5  x5 x5 x5 x5                                             Ther Ex    Rockerboard  F/b, side, circles np  nv stand x10, bal 30"x1 X20, bal 30"x1 ea 30"y2jmniroy,  20x  30"i0nvvgglq,  20x 30"x1 bal  20x  BAPS x30ea BAPS x30ea    Ankle AROM all HEP            Ankle circles HEP        30x CW  /CCW 30x cw/ccw   Ankle ABCs HEP        x1 x1   TB ankle all Green 30x ea  D/C       Green x30 ea Green x30 ea   LAQ 4# 30x 5" D/C       4# 30x 5" 4# 30x 5"   Supine gastroc str Stand   30"x3 Stand   30"x3 Stand   30"x3 Stand   30"x3 Stand  30"x3 Stand  30"x3 Stand  30"x3  30"x3 30"x3   Mini squats 2x10  nv 2x10 2x10 2x10 2x10 2x10                                Ther Activity    bike L1x5' L1x8' L1x8' L1x8' L1x8' L1x5' L1 x10'  L1x5' L1 x5'   Step ups - fwd, dwn  nv                        Gait Training                              Modalities    CP PRN

## 2022-08-18 ENCOUNTER — OFFICE VISIT (OUTPATIENT)
Dept: PHYSICAL THERAPY | Facility: REHABILITATION | Age: 62
End: 2022-08-18
Payer: COMMERCIAL

## 2022-08-18 DIAGNOSIS — Z87.81 S/P ORIF (OPEN REDUCTION INTERNAL FIXATION) FRACTURE: Primary | ICD-10-CM

## 2022-08-18 DIAGNOSIS — S82.832A OTHER CLOSED FRACTURE OF DISTAL END OF LEFT FIBULA, INITIAL ENCOUNTER: ICD-10-CM

## 2022-08-18 DIAGNOSIS — Z98.890 S/P ORIF (OPEN REDUCTION INTERNAL FIXATION) FRACTURE: Primary | ICD-10-CM

## 2022-08-18 PROCEDURE — 97140 MANUAL THERAPY 1/> REGIONS: CPT

## 2022-08-18 PROCEDURE — 97112 NEUROMUSCULAR REEDUCATION: CPT

## 2022-08-18 PROCEDURE — 97110 THERAPEUTIC EXERCISES: CPT

## 2022-08-18 NOTE — PROGRESS NOTES
Daily Note     Today's date: 2022  Patient name: Iqra Kingston  : 1960  MRN: [id number]  Referring provider: Bola Orellana PA-C  Dx:   Encounter Diagnosis     ICD-10-CM    1  S/P ORIF (open reduction internal fixation) fracture  Z98 890     Z87 81    2  Other closed fracture of distal end of left fibula, initial encounter  T64 407W                   Subjective: pt reports her ankle is feeling pretty good  Denied pain pre-tx  Objective: See treatment diary below      Assessment: Tolerated treatment well and without complaints  Pt exhibits improved neuromuscular control with balance and proprioceptive activities  PROM apears to be improving as well  Patient demonstrated fatigue post treatment, exhibited good technique with therapeutic exercises and would benefit from continued PT      Plan: Continue per plan of care  Progress treatment as tolerated         Precautions: n/a  Daily Treatment Diary     Date  8   FOTO  nv           Re-eval  WAQAR               Manuals             PROM ankle np WAQAR TE TE SC TE SC TE  HA   Talocrural PA mob np   TE gentle glides SC   glides TE glides SC  glides TE glides                               Neuro Re-Ed     Toe curls HEP            BIODEX-ws  30 hits x1 ea (fwd, side, diag) Foam 30 hits x1 ea Foam 30 hits x1 ea Foam 30 hits x1 ea Foam 30 hits  1x ea Foam 30 hits  2x ea L12  30 hits 2x ea L12  30 hits 2x ea  30 hits x1 ea   BIODEX - LOS  Foam x3 Foam x4 Foam x4 Foam x2 Foam x2 Foam x2 L12 x2      Step ups/downs 0R x10 ea  1R nv 1R 2x10/x10 1R 2x10/  0R 2x10 1R 2x10/  0R  2x10 2R 2x10/0R 2x10 2R 2x10/  0R 2x10 2R/1R   2x10 ea     Tandem stance  nv 30"x1 30"x2 ea 30"x2 ea 30"x1 ea 30"x2 ea 30"x2 ea 30"x2 ea     SLS nv 30"x2 ea 30"x2 ea 30"x2 ea np        Clock drill  nv x5  x5 x5 x5 x5 x5                                            Ther Ex    Rockerboard  F/b, side, circles  np  nv stand x10, bal 30"x1 X20, bal 30"x1 ea 30"p0bwubyjg,  20x  30"r6fuvjfku,  20x 30"x1 bal  20x 30"x1 bal  20x  BAPS x30ea    Ankle AROM all HEP            Ankle circles HEP         30x cw/ccw   Ankle ABCs HEP         x1   TB ankle all Green 30x ea  D/C        Green x30 ea   LAQ 4# 30x 5" D/C        4# 30x 5"   Supine gastroc str Stand   30"x3 Stand   30"x3 Stand   30"x3 Stand   30"x3 Stand  30"x3 Stand  30"x3 Stand  30"x3 Stand  30"x3  30"x3   Mini squats 2x10  nv 2x10 2x10 2x10 2x10 2x10 2x10                               Ther Activity    bike L1x5' L1x8' L1x8' L1x8' L1x8' L1x5' L1 x10' L1 x10'  L1 x5'   Step ups - fwd, dwn  nv                        Gait Training                              Modalities    CP PRN

## 2022-08-23 ENCOUNTER — OFFICE VISIT (OUTPATIENT)
Dept: OBGYN CLINIC | Facility: CLINIC | Age: 62
End: 2022-08-23
Payer: COMMERCIAL

## 2022-08-23 ENCOUNTER — OFFICE VISIT (OUTPATIENT)
Dept: PHYSICAL THERAPY | Facility: REHABILITATION | Age: 62
End: 2022-08-23
Payer: COMMERCIAL

## 2022-08-23 ENCOUNTER — APPOINTMENT (OUTPATIENT)
Dept: RADIOLOGY | Facility: AMBULARY SURGERY CENTER | Age: 62
End: 2022-08-23
Attending: ORTHOPAEDIC SURGERY
Payer: COMMERCIAL

## 2022-08-23 VITALS
SYSTOLIC BLOOD PRESSURE: 120 MMHG | DIASTOLIC BLOOD PRESSURE: 85 MMHG | HEART RATE: 63 BPM | BODY MASS INDEX: 27.49 KG/M2 | WEIGHT: 165 LBS | HEIGHT: 65 IN

## 2022-08-23 DIAGNOSIS — S82.822A CLOSED TORUS FRACTURE OF DISTAL END OF LEFT FIBULA, INITIAL ENCOUNTER: ICD-10-CM

## 2022-08-23 DIAGNOSIS — Z87.81 S/P ORIF (OPEN REDUCTION INTERNAL FIXATION) FRACTURE: Primary | ICD-10-CM

## 2022-08-23 DIAGNOSIS — S82.822A CLOSED TORUS FRACTURE OF DISTAL END OF LEFT FIBULA, INITIAL ENCOUNTER: Primary | ICD-10-CM

## 2022-08-23 DIAGNOSIS — Z98.890 S/P ORIF (OPEN REDUCTION INTERNAL FIXATION) FRACTURE: Primary | ICD-10-CM

## 2022-08-23 DIAGNOSIS — S82.832A OTHER CLOSED FRACTURE OF DISTAL END OF LEFT FIBULA, INITIAL ENCOUNTER: ICD-10-CM

## 2022-08-23 PROCEDURE — 73610 X-RAY EXAM OF ANKLE: CPT

## 2022-08-23 PROCEDURE — 97110 THERAPEUTIC EXERCISES: CPT

## 2022-08-23 PROCEDURE — 97112 NEUROMUSCULAR REEDUCATION: CPT

## 2022-08-23 PROCEDURE — 97140 MANUAL THERAPY 1/> REGIONS: CPT

## 2022-08-23 PROCEDURE — 99213 OFFICE O/P EST LOW 20 MIN: CPT | Performed by: ORTHOPAEDIC SURGERY

## 2022-08-23 NOTE — PROGRESS NOTES
Daily Note     Today's date: 2022  Patient name: Stiven Reid  : 1960  MRN: [id number]  Referring provider: Nick Barajas PA-C  Dx:   Encounter Diagnosis     ICD-10-CM    1  S/P ORIF (open reduction internal fixation) fracture  Z98 890     Z87 81    2  Other closed fracture of distal end of left fibula, initial encounter  M15 201J                   Subjective: pt currently denied pain in ankle pre-tx  She reported returning to surgeon today for f/u appt  Objective: See treatment diary below      Assessment: Tolerated treatment well and without complaints  Less tolerance with step activities due to c/o knee pain doing so, otherwise, no pain with remainder of exercises  Patient demonstrated fatigue post treatment, exhibited good technique with therapeutic exercises and would benefit from continued PT  Plan: Continue per plan of care  Progress treatment as tolerated         Precautions: n/a  Daily Treatment Diary     Date  8    FOTO  nv           Re-eval  WAQAR               Manuals             PROM ankle np WAQAR TE TE SC TE SC TE TE    Talocrural PA mob np   TE gentle glides SC   glides TE glides SC  glides TE glides TE glides                              Neuro Re-Ed     Toe curls HEP            BIODEX-ws  30 hits x1 ea (fwd, side, diag) Foam 30 hits x1 ea Foam 30 hits x1 ea Foam 30 hits x1 ea Foam 30 hits  1x ea Foam 30 hits  2x ea L12  30 hits 2x ea L12  30 hits 2x ea L12  30 hits 2x ea    BIODEX - LOS  Foam x3 Foam x4 Foam x4 Foam x2 Foam x2 Foam x2 L12 x2  L12 x2     Step ups/downs 0R x10 ea  1R nv 1R 2x10/x10 1R 2x10/  0R 2x10 1R 2x10/  0R  2x10 2R 2x10/0R 2x10 2R 2x10/  0R 2x10 2R/1R   2x10 ea 2R 2x10 ea    Tandem stance  nv 30"x1 30"x2 ea 30"x2 ea 30"x1 ea 30"x2 ea 30"x2 ea 30"x2 ea 30"x2 ea    SLS nv 30"x2 ea 30"x2 ea 30"x2 ea np        Clock drill  nv x5  x5 x5 x5 x5 x5 x5                                           Ther Ex    Rockerboard  F/b, side, circles  np  nv stand x10, bal 30"x1 X20, bal 30"x1 ea 30"i7vgeqmmk,  20x  30"t8oisbqvp,  20x 30"x1 bal  20x 30"x1 bal  20x 30"x1 bal,  30x    Ankle AROM all HEP            Ankle circles HEP            Ankle ABCs HEP            TB ankle all Green 30x ea  D/C           LAQ 4# 30x 5" D/C           Supine gastroc str Stand   30"x3 Stand   30"x3 Stand   30"x3 Stand   30"x3 Stand  30"x3 Stand  30"x3 Stand  30"x3 Stand  30"x3 Stand  30"x3    Mini squats 2x10  nv 2x10 2x10 2x10 2x10 2x10 2x10 x15 knee p!                               Ther Activity    bike L1x5' L1x8' L1x8' L1x8' L1x8' L1x5' L1 x10' L1 x10' TM x5' 1 5 mph    Step ups - fwd, dwn  nv                        Gait Training                              Modalities    CP PRN

## 2022-08-23 NOTE — PROGRESS NOTES
DONNIE Montero  Attending, Orthopaedic Surgery  [facility] [address] [facility]      ORTHOPAEDIC FOOT AND ANKLE CLINIC VISIT     Assessment:     Encounter Diagnosis   Name Primary?  Closed torus fracture of distal end of left fibula, initial encounter Yes            Plan:   · The patient verbalized understanding of exam findings and treatment plan  We engaged in the shared decision-making process and treatment options were discussed at length with the patient  Surgical and conservative management discussed today along with risks and benefits  · Roni Castillo is 3 months follow up ORIF left lateral malleolus fracture  Her xray today shows hardware in expected position without loosening  · She is to continue PT   · She is to rest, ice, elevation as needed  · She is able to return to her daily activities as tolerated  Return if symptoms worsen or fail to improve  History of Present Illness:   Chief Complaint:   Chief Complaint   Patient presents with    Left Ankle - Follow-up     Patrick Fuller is a 62 y o  female who is being seen in follow-up for left lateral malleolus fracture  When we last saw she we recommended PT, compression stocking and WBAT extremity and begin weaning out of boot  She is walking now in her shoes w/o issues  She states that it is continuing to feel stronger and PT is helping  Pain has improved  Pain/symptom timing:  Worse during the day when active  Pain/symptom context:  Worse with activites and work  Pain/symptom modifying factors:  Rest makes better, activities make worse  Pain/symptom associated signs/symptoms: none    Prior treatment   · NSAIDsYes   · Injections No   · Bracing/Orthotics No    · Physical Therapy No     Orthopedic Surgical History:   See below    Past Medical, Surgical and Social History:  Past Medical History:  has a past medical history of Hypertension, Seizure ([address] ), and Thyroid disease    Problem List: does not have any pertinent problems on file  Past Surgical History:  has a past surgical history that includes Brain surgery; Ovarian cyst surgery; Hysterectomy; [place] tooth extraction; and pr open tx distal fibular fracture lat malleolus (Left, 5/23/2022)  Family History: family history includes Heart disease in her father; Seizures in her mother; Stroke in her mother; Thyroid disease in her mother  Social History:  reports that she quit smoking about 24 years ago  Her smoking use included cigarettes  She has a 15 00 pack-year smoking history  She has never used smokeless tobacco  She reports that she does not drink alcohol and does not use drugs  Current Medications: has a current medication list which includes the following prescription(s): briviact, calcium carbonate-vitamin d, cholecalciferol, b-12, lacosamide, lamotrigine, pantoprazole, zonisamide, aspirin, and ondansetron  Allergies: has No Known Allergies  Review of Systems:  General- denies fever/chills  HEENT- denies hearing loss or sore throat  Eyes- denies eye pain or visual disturbances, denies red eyes  Respiratory- denies cough or SOB  Cardio- denies chest pain or palpitations  GI- denies abdominal pain  Endocrine- denies urinary frequency  Urinary- denies pain with urination  Musculoskeletal- Negative except noted above  Skin- denies rashes or wounds  Neurological- denies dizziness or headache  Psychiatric- denies anxiety or difficulty concentrating    Physical Exam:   /85 (BP Location: Right arm, Patient Position: Sitting, Cuff Size: Adult)   Pulse 63   Ht 5' 5" (1 651 m)   Wt 74 8 kg (165 lb)   BMI 27 46 kg/m²   General/Constitutional: No apparent distress: well-nourished and well developed    Eyes: normal ocular motion  Lymphatic: No appreciable lymphadenopathy  Respiratory: Non-labored breathing  Vascular: No edema, swelling or tenderness, except as noted in detailed exam   Integumentary: No impressive skin lesions present, except as noted in detailed exam   Neuro: No ataxia or tremors noted  Psych: Normal mood and affect, oriented to person, place and time  Appropriate affect  Musculoskeletal: Normal, except as noted in detailed exam and in HPI  Examination    Left    Gait Normal   Musculoskeletal NTTP    Skin Normal   Well-healed incisions  Nails Normal    Range of Motion  20 degrees dorsiflexion, 40 degrees plantarflexion  Subtalar motion: normal    Stability Stable    Muscle Strength 5/5 tibialis anterior  5/5 gastrocnemius-soleus  5/5 posterior tibialis  5/5 peroneal/eversion strength  5/5 EHL  5/5 FHL    Neurologic Normal    Sensation Intact to light touch throughout sural, saphenous, superficial peroneal, deep peroneal and medial/lateral plantar nerve distributions  [place] 5 07 filament (10g) testing deferred  Cardiovascular Brisk capillary refill < 2 seconds,intact DP and PT pulses    Special Tests None      Imaging Studies:   3 views of the Left ankle were obtained, reviewed and interpreted independently which demonstrate shows hardware in expected position without loosening or failure  Reviewed by me personally  Scribe Attestation    I,:  Kelsea Shin PA-C am acting as a scribe while in the presence of the attending physician :       I,:  Jim Orellana MD personally performed the services described in this documentation    as scribed in my presence :            Newt Adas Lachman, MD  Foot & Ankle Surgery   Department of [address] [place] [address]      I personally performed the service  Newt Adas Lachman, MD

## 2022-08-25 ENCOUNTER — OFFICE VISIT (OUTPATIENT)
Dept: PHYSICAL THERAPY | Facility: REHABILITATION | Age: 62
End: 2022-08-25
Payer: COMMERCIAL

## 2022-08-25 DIAGNOSIS — Z98.890 S/P ORIF (OPEN REDUCTION INTERNAL FIXATION) FRACTURE: Primary | ICD-10-CM

## 2022-08-25 DIAGNOSIS — S82.832A OTHER CLOSED FRACTURE OF DISTAL END OF LEFT FIBULA, INITIAL ENCOUNTER: ICD-10-CM

## 2022-08-25 DIAGNOSIS — Z87.81 S/P ORIF (OPEN REDUCTION INTERNAL FIXATION) FRACTURE: Primary | ICD-10-CM

## 2022-08-25 PROCEDURE — 97140 MANUAL THERAPY 1/> REGIONS: CPT

## 2022-08-25 PROCEDURE — 97110 THERAPEUTIC EXERCISES: CPT

## 2022-08-25 PROCEDURE — 97112 NEUROMUSCULAR REEDUCATION: CPT

## 2022-08-25 NOTE — PROGRESS NOTES
Daily Note     Today's date: 2022  Patient name: Cheryl Birmingham  : 1960  MRN: [id number]  Referring provider: Ellyn Amato PA-C  Dx:   Encounter Diagnosis     ICD-10-CM    1  S/P ORIF (open reduction internal fixation) fracture  Z98 890     Z87 81    2  Other closed fracture of distal end of left fibula, initial encounter  V06 321P                   Subjective: Patient reports no new complaints, just continues to have minimal swelling after activity  Objective: See treatment diary below      Assessment:  Patient tolerated all exercises well  Pt required minimal verbal cues throughout the session for form and to decrease compensatory techniques, but was able correct after cuing  Continue to progress as tolerated  Plan: Continue per plan of care        Precautions: n/a  Daily Treatment Diary     Date    FOTO  nv        done   Re-eval  WAQAR               Manuals             PROM ankle np WAQAR TE TE SC TE SC TE TE JG   Talocrural PA mob np   TE gentle glides SC   glides TE glides SC  glides TE glides TE glides                              Neuro Re-Ed     Toe curls HEP            BIODEX-ws  30 hits x1 ea (fwd, side, diag) Foam 30 hits x1 ea Foam 30 hits x1 ea Foam 30 hits x1 ea Foam 30 hits  1x ea Foam 30 hits  2x ea L12  30 hits 2x ea L12  30 hits 2x ea L12  30 hits 2x ea L12 30 hits 2x   BIODEX - LOS  Foam x3 Foam x4 Foam x4 Foam x2 Foam x2 Foam x2 L12 x2  L12 x2  L12 x2   Step ups/downs 0R x10 ea  1R nv 1R 2x10/x10 1R 2x10/  0R 2x10 1R 2x10/  0R  2x10 2R 2x10/0R 2x10 2R 2x10/  0R 2x10 2R/1R   2x10 ea 2R 2x10 ea 2R 2x10 ea   Tandem stance  nv 30"x1 30"x2 ea 30"x2 ea 30"x1 ea 30"x2 ea 30"x2 ea 30"x2 ea 30"x2 ea 30" x 2   SLS nv 30"x2 ea 30"x2 ea 30"x2 ea np        Clock drill  nv x5  x5 x5 x5 x5 x5 x5 x5                                          Ther Ex    Rockerboard  F/b, side, circles  np  nv stand x10, bal 30"x1 X20, bal 30"x1 ea 30"r0mizbvnz,  20x Patient extubated and placed on 2LPM NC. RNs Van and Geetha at bedside.   30"g7nsnxhir,  20x 30"x1 bal  20x 30"x1 bal  20x 30"x1 bal,  30x 30"x1   30x ea   Ankle AROM all HEP            Ankle circles HEP            Ankle ABCs HEP            TB ankle all Green 30x ea  D/C           LAQ 4# 30x 5" D/C           Supine gastroc str Stand   30"x3 Stand   30"x3 Stand   30"x3 Stand   30"x3 Stand  30"x3 Stand  30"x3 Stand  30"x3 Stand  30"x3 Stand  30"x3 Stand 30" x3   Mini squats 2x10  nv 2x10 2x10 2x10 2x10 2x10 2x10 x15 knee p! x15                             Ther Activity    bike L1x5' L1x8' L1x8' L1x8' L1x8' L1x5' L1 x10' L1 x10' TM x5' 1 5 mph TM x5' 1 5mph   Step ups - fwd, dwn  nv                        Gait Training                              Modalities    CP PRN

## 2022-08-30 ENCOUNTER — OFFICE VISIT (OUTPATIENT)
Dept: PHYSICAL THERAPY | Facility: REHABILITATION | Age: 62
End: 2022-08-30
Payer: COMMERCIAL

## 2022-08-30 DIAGNOSIS — Z98.890 S/P ORIF (OPEN REDUCTION INTERNAL FIXATION) FRACTURE: Primary | ICD-10-CM

## 2022-08-30 DIAGNOSIS — S82.832A OTHER CLOSED FRACTURE OF DISTAL END OF LEFT FIBULA, INITIAL ENCOUNTER: ICD-10-CM

## 2022-08-30 DIAGNOSIS — Z87.81 S/P ORIF (OPEN REDUCTION INTERNAL FIXATION) FRACTURE: Primary | ICD-10-CM

## 2022-08-30 PROCEDURE — 97112 NEUROMUSCULAR REEDUCATION: CPT | Performed by: PHYSICAL THERAPIST

## 2022-08-30 PROCEDURE — 97110 THERAPEUTIC EXERCISES: CPT | Performed by: PHYSICAL THERAPIST

## 2022-08-30 PROCEDURE — 97140 MANUAL THERAPY 1/> REGIONS: CPT | Performed by: PHYSICAL THERAPIST

## 2022-08-30 NOTE — PROGRESS NOTES
Daily Note     Today's date: 2022  Patient name: Tamy Orellana  : 1960  MRN: [id number]  Referring provider: Marcie Yoo PA-C  Dx:   Encounter Diagnosis     ICD-10-CM    1  S/P ORIF (open reduction internal fixation) fracture  Z98 890     Z87 81    2  Other closed fracture of distal end of left fibula, initial encounter  E85 293D                   Subjective: Pt comes to therapy denying notable pain or discomfort in her ankle  States she is able to take long walks and perform ADLs and stairs without limitations  Notes she plans to resume her aqua aerobics class soon  Objective: See treatment diary below      Assessment: Tolerated treatment well  Demonstrates WFL strength and mobility of her left ankle, with minor decrease in plantarflexion strength  Patient exhibited good technique with therapeutic exercises      Plan: Potential discharge next visit       Precautions: n/a  Daily Treatment Diary     Date    FOTO nv         done   Re-eval                 Manuals             PROM ankle WAQAR  TE TE SC TE SC TE TE JG   Talocrural PA mob WAQAR gr 4   TE gentle glides SC   glides TE glides SC  glides TE glides TE glides                              Neuro Re-Ed     Toe curls             BIODEX-ws  L10   30 hits 2x  Foam 30 hits x1 ea Foam 30 hits x1 ea Foam 30 hits  1x ea Foam 30 hits  2x ea L12  30 hits 2x ea L12  30 hits 2x ea L12  30 hits 2x ea L12 30 hits 2x   BIODEX - LOS L12 x2  Foam x4 Foam x4 Foam x2 Foam x2 Foam x2 L12 x2  L12 x2  L12 x2   Step ups/downs D/C  1R 2x10/x10 1R 2x10/  0R 2x10 1R 2x10/  0R  2x10 2R 2x10/0R 2x10 2R 2x10/  0R 2x10 2R/1R   2x10 ea 2R 2x10 ea 2R 2x10 ea   Tandem stance  Foam 30"x2 ea  30"x2 ea 30"x2 ea 30"x1 ea 30"x2 ea 30"x2 ea 30"x2 ea 30"x2 ea 30" x 2   SLS   30"x2 ea 30"x2 ea np        Clock drill x5  x5  x5 x5 x5 x5 x5 x5 x5                                          Ther Ex    Rockerboard  F/b, side, circles  30"x1   30x ea  x10, bal 30"x1 X20, bal 30"x1 ea 30"n8tyyxset,  20x  30"f7invachi,  20x 30"x1 bal  20x 30"x1 bal  20x 30"x1 bal,  30x 30"x1   30x ea   Supine gastroc str Stand 30" x3  Stand   30"x3 Stand   30"x3 Stand  30"x3 Stand  30"x3 Stand  30"x3 Stand  30"x3 Stand  30"x3 Stand 30" x3   Mini squats 2x15  2x10 2x10 2x10 2x10 2x10 2x10 x15 knee p! x15                             Ther Activity    bike L1x10'  L1x8' L1x8' L1x8' L1x5' L1 x10' L1 x10' TM x5' 1 5 mph TM x5' 1 5mph                Gait Training                              Modalities    CP PRN

## 2022-09-01 ENCOUNTER — OFFICE VISIT (OUTPATIENT)
Dept: PHYSICAL THERAPY | Facility: REHABILITATION | Age: 62
End: 2022-09-01
Payer: COMMERCIAL

## 2022-09-01 DIAGNOSIS — S82.832A OTHER CLOSED FRACTURE OF DISTAL END OF LEFT FIBULA, INITIAL ENCOUNTER: ICD-10-CM

## 2022-09-01 DIAGNOSIS — Z87.81 S/P ORIF (OPEN REDUCTION INTERNAL FIXATION) FRACTURE: Primary | ICD-10-CM

## 2022-09-01 DIAGNOSIS — Z98.890 S/P ORIF (OPEN REDUCTION INTERNAL FIXATION) FRACTURE: Primary | ICD-10-CM

## 2022-09-01 PROCEDURE — 97110 THERAPEUTIC EXERCISES: CPT

## 2022-09-01 PROCEDURE — 97140 MANUAL THERAPY 1/> REGIONS: CPT

## 2022-09-01 PROCEDURE — 97112 NEUROMUSCULAR REEDUCATION: CPT

## 2022-09-01 NOTE — PROGRESS NOTES
Daily Note     Today's date: 2022  Patient name: Veronica Aguilera  : 1960  MRN: [id number]  Referring provider: Daniel Markham PA-C  Dx:   Encounter Diagnosis     ICD-10-CM    1  S/P ORIF (open reduction internal fixation) fracture  Z98 890     Z87 81    2  Other closed fracture of distal end of left fibula, initial encounter  L08 243D                   Subjective: pt offered no complaints and wishes to make today the last day  Objective: See treatment diary below      Assessment: Tolerated treatment well and without complaints  Good knowledge of current program  Added 1/2 kneeling DF stretch to further promote increasing ankle mobility  Patient demonstrated fatigue post treatment and exhibited good technique with therapeutic exercises  Issued/reviewed updated HEP with no questions or concerns expressed  Plan: pt currently discharged to be independent in HEP   pt instructed to perform 3x/wk     Precautions: n/a  Daily Treatment Diary     Date    FOTO nv (perf )        done   Re-eval                 Manuals             PROM ankle WAQAR TE  TE SC TE SC TE TE JG   Talocrural PA mob WAQAR gr 4 TE glides  TE gentle glides SC   glides TE glides SC  glides TE glides TE glides                              Neuro Re-Ed     Toe curls             BIODEX-ws  L10   30 hits 2x L10   30 hits 2x  Foam 30 hits x1 ea Foam 30 hits  1x ea Foam 30 hits  2x ea L12  30 hits 2x ea L12  30 hits 2x ea L12  30 hits 2x ea L12 30 hits 2x   BIODEX - LOS L12 x2 L12 x2  Foam x4 Foam x2 Foam x2 Foam x2 L12 x2  L12 x2  L12 x2   Step ups/downs D/C   1R 2x10/  0R 2x10 1R 2x10/  0R  2x10 2R 2x10/0R 2x10 2R 2x10/  0R 2x10 2R/1R   2x10 ea 2R 2x10 ea 2R 2x10 ea   Tandem stance  Foam 30"x2 ea Foam 30"x2 ea  30"x2 ea 30"x1 ea 30"x2 ea 30"x2 ea 30"x2 ea 30"x2 ea 30" x 2   SLS    30"x2 ea np        Clock drill x5 x5  x5 x5 x5 x5 x5 x5 x5                                          Ther Ex Rockerboard  F/b, side, circles  30"x1   30x ea 30"x1   30x ea  X20, bal 30"x1 ea 30"l7qjbjgys,  20x  30"t4jtyznfd,  20x 30"x1 bal  20x 30"x1 bal  20x 30"x1 bal,  30x 30"x1   30x ea   Supine gastroc str Stand 30" x3 Stand 30" x3  Stand   30"x3 Stand  30"x3 Stand  30"x3 Stand  30"x3 Stand  30"x3 Stand  30"x3 Stand 30" x3   Mini squats 2x15 2x15  2x10 2x10 2x10 2x10 2x10 x15 knee p! x15   1/2 kneel DF stretch  10"x10           prostretch  30"x3           Ther Activity    bike L1x10' L1x10'  L1x8' L1x8' L1x5' L1 x10' L1 x10' TM x5' 1 5 mph TM x5' 1 5mph                Gait Training                              Modalities    CP PRN

## 2022-09-08 ENCOUNTER — TELEPHONE (OUTPATIENT)
Dept: NEUROLOGY | Facility: CLINIC | Age: 62
End: 2022-09-08

## 2022-09-08 NOTE — TELEPHONE ENCOUNTER
Pt has called and confirmed her appt for 09/12/22 at 9 am at the [facility]      Thank you,     Lisa Johnson

## 2022-09-12 ENCOUNTER — OFFICE VISIT (OUTPATIENT)
Dept: NEUROLOGY | Facility: CLINIC | Age: 62
End: 2022-09-12
Payer: COMMERCIAL

## 2022-09-12 VITALS
HEART RATE: 95 BPM | HEIGHT: 65 IN | BODY MASS INDEX: 27.82 KG/M2 | SYSTOLIC BLOOD PRESSURE: 142 MMHG | DIASTOLIC BLOOD PRESSURE: 69 MMHG | WEIGHT: 167 LBS

## 2022-09-12 DIAGNOSIS — Q04.3 POLYMICROGYRIA (HCC): ICD-10-CM

## 2022-09-12 DIAGNOSIS — G40.209 LOCALIZATION-RELATED FOCAL EPILEPSY WITH COMPLEX PARTIAL SEIZURES (HCC): ICD-10-CM

## 2022-09-12 DIAGNOSIS — S82.832A CLOSED FRACTURE OF DISTAL END OF LEFT FIBULA, UNSPECIFIED FRACTURE MORPHOLOGY, INITIAL ENCOUNTER: ICD-10-CM

## 2022-09-12 PROCEDURE — 99214 OFFICE O/P EST MOD 30 MIN: CPT | Performed by: PSYCHIATRY & NEUROLOGY

## 2022-09-12 RX ORDER — BRIVARACETAM 75 MG/1
75 TABLET, FILM COATED ORAL 2 TIMES DAILY
Qty: 180 TABLET | Refills: 1 | Status: CANCELLED | OUTPATIENT
Start: 2022-09-12

## 2022-09-12 RX ORDER — ONDANSETRON 4 MG/1
4 TABLET, FILM COATED ORAL EVERY 8 HOURS PRN
Qty: 30 TABLET | Refills: 0 | Status: SHIPPED | OUTPATIENT
Start: 2022-09-12

## 2022-09-12 RX ORDER — LAMOTRIGINE 200 MG/1
TABLET ORAL
Qty: 180 TABLET | Refills: 3 | Status: SHIPPED | OUTPATIENT
Start: 2022-09-12

## 2022-09-12 RX ORDER — ZONISAMIDE 100 MG/1
CAPSULE ORAL
Qty: 270 CAPSULE | Refills: 3 | Status: SHIPPED | OUTPATIENT
Start: 2022-09-12

## 2022-09-12 RX ORDER — LACOSAMIDE 100 MG/1
TABLET ORAL
Qty: 270 TABLET | Refills: 1 | Status: SHIPPED | OUTPATIENT
Start: 2022-09-12

## 2022-09-12 NOTE — ASSESSMENT & PLAN NOTE
She has had episodes of left arm stiffening, which she feels is similar to what she would experience were seizure, it is certainly possible these may represent focal aware seizures, but thankfully has not turned into a larger seizure  We discussed continuing her medicines unchanged versus increasing her Briviact slightly to give her more above her  She preferred to adjust her medications since she does not foresee and improvement in her level of stress or interruptions in her sleep  Plan  1  She will increase briviact to 100mg 2x/day   2  She will continue her other seizure medications unchanged     3  3 month follow-up

## 2022-09-12 NOTE — PATIENT INSTRUCTIONS
Please increase briviact to 100mg 2x/day   Continue other seizure medications unchanged     3 month follow-up

## 2022-09-12 NOTE — PROGRESS NOTES
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”, who is returning to Neurology office for follow up of her seizures  Assessment/Plan:    Localization-related focal epilepsy with complex partial seizures ([address] )  She has had episodes of left arm stiffening, which she feels is similar to what she would experience were seizure, it is certainly possible these may represent focal aware seizures, but thankfully has not turned into a larger seizure  We discussed continuing her medicines unchanged versus increasing her Briviact slightly to give her more above her  She preferred to adjust her medications since she does not foresee and improvement in her level of stress or interruptions in her sleep  Plan  1  She will increase briviact to 100mg 2x/day   2  She will continue her other seizure medications unchanged  3  3 month follow-up        Physiologic tremor that was appreciated on exam but very minor and no parkinsonian symptoms  Recommend for patient to continue to monitor as previously had not really noticed  She will Return in about 3 months (around 12/12/2022)  Subjective:    HPI  Current seizure medications:  1  Lacosamide 100 mg in the morning and 200 mg at night  2  Briviact 75 mg twice a day  3  Zonisamide 100 mg in the morning and 200 mg at night  4  Lamotrigine 200 mg twice a day   Other medications as per Epic  Since her last visit, she has been healthy  She had broken in L ankle in April 2022 and that is all healed  She took peanut butter to calm her stomach before meds  Found out she has GERD and taking protonix  She does have a stressful event in regards in planning for her uncle in regards to her POA   This has been affecting her sleep      She notices some vision issues, if she doesn't eat enough  If she eats normally prior to medications, she has no side effects  She did not have any definite seizures, but die have sensations of feeling like her L shoulder and arm is getting stiff (since last Saturday and has been on/off) which she feels is associated w/ past seizures  She feels this is more likley occurring because of her recent stress and poor sleep  Prior Seizure Medications: Phentoin (changed due to potential long-term side effects), Levetiracetam (behavioral changes)       Her history was also obtained from her , who was present at today's visit  I personally reviewed her past EEGs and MRI brain from 05/2018 and 08/2018   I reviewed , as documented in Epic/[employer], and summarized above  Objective:    Blood pressure 142/69, pulse 95, height 5' 5" (1 651 m), weight 75 8 kg (167 lb)  Physical Exam  Eyes:      General: Lids are normal       Extraocular Movements: Extraocular movements intact  Pupils: Pupils are equal, round, and reactive to light  Neurological:      Deep Tendon Reflexes: Strength normal       Reflex Scores:       Tricep reflexes are 2+ on the right side and 2+ on the left side  Bicep reflexes are 2+ on the right side and 2+ on the left side  Brachioradialis reflexes are 2+ on the right side and 2+ on the left side  Patellar reflexes are 1+ on the right side and 1+ on the left side  Achilles reflexes are 2+ on the right side and 2+ on the left side  GENERAL EXAM:    CONSTITUTIONAL: Well developed, well nourished, well groomed  No dysmorphic features  Eyes:  PERRLA, EOM normal      Neck:  Normal ROM, neck supple  HEENT:  Normocephalic atraumatic  No meningismus  Oropharynx is clear and moist  No oral mucosal lesions  Chest:  Respirations regular and unlabored      Cardiovascular:  Distal extremities warm without palpable edema or tenderness, no observed significant swelling  Musculoskeletal:  Full range of motion  Skin:  warm and dry   Psychiatric:  Normal behavior and appropriate affect          Neurological Exam  Mental Status  Awake, alert and oriented to person, place and time  Cranial Nerves  CN II: Visual acuity is normal  Visual fields full to confrontation  CN III, IV, VI: Extraocular movements intact bilaterally  Normal lids and orbits bilaterally  Pupils equal round and reactive to light bilaterally  CN V: Facial sensation is normal   CN VII: Full and symmetric facial movement  CN VIII: Hearing is normal   CN IX, X: Palate elevates symmetrically  Normal gag reflex  CN XI: Shoulder shrug strength is normal   CN XII: Tongue midline without atrophy or fasciculations  Motor   Strength is 5/5 throughout all four extremities  Sensory  Sensation is intact to light touch, pinprick, vibration and proprioception in all four extremities  Reflexes                                            Right                      Left  Brachioradialis                    2+                         2+  Biceps                                 2+                         2+  Triceps                                2+                         2+  Finger flex                           2+                         2+  Hamstring                            2+                         2+  Patellar                                1+                         1+  Achilles                                2+                         2+    Coordination  Right: Finger-to-nose abnormality: Rapid alternating movement normal  Heel-to-shin normal Left: Finger-to-nose abnormality: Rapid alternating movement normal  Heel-to-shin normal   Noted minor terminal 3rd physiologic tremor in bilateral hands   Gait  Casual gait is normal including stance, stride, and arm swing          ROS:    Review of Systems    I personally reviewed the ROS that was entered by the medical assistant in a separate note  Voice recognition software was used in the generation of this note  There may be unintentional errors including grammatical errors, spelling errors, or pronoun errors  I saw and examined the patient with the resident on 9/12/2022  I edited the note in its entirety

## 2023-01-04 ENCOUNTER — OFFICE VISIT (OUTPATIENT)
Dept: NEUROLOGY | Facility: CLINIC | Age: 63
End: 2023-01-04

## 2023-01-04 VITALS
DIASTOLIC BLOOD PRESSURE: 72 MMHG | BODY MASS INDEX: 28.16 KG/M2 | WEIGHT: 169 LBS | HEIGHT: 65 IN | SYSTOLIC BLOOD PRESSURE: 118 MMHG

## 2023-01-04 DIAGNOSIS — G40.209 LOCALIZATION-RELATED FOCAL EPILEPSY WITH COMPLEX PARTIAL SEIZURES (HCC): Primary | ICD-10-CM

## 2023-01-04 RX ORDER — LACOSAMIDE 100 MG/1
TABLET ORAL
Qty: 270 TABLET | Refills: 1 | Status: SHIPPED | OUTPATIENT
Start: 2023-01-04

## 2023-01-04 NOTE — PATIENT INSTRUCTIONS
-- continue to take your medications unchanged  -- Please get medication levels checked before your next appointment

## 2023-01-04 NOTE — ASSESSMENT & PLAN NOTE
Needs 90 days with refills to new pharmacy  Only has 2 pills left     Current Outpatient Prescriptions:   •  Norethindrone-Eth Estradiol (ORTHO-NOVUM 1/35, 28,) 1-35 MG-MCG Oral Tab, Take 1 tablet by mouth daily. , Disp: 1 Package, Rfl: 0    •  escitalopram She has not had any other seizures or sensations as if she was about to have a seizure since her last visit  She has been tolerating her medications well  It is not entirely clear if her left sided arm tightness is due to seizure, but it is possible this could be a focal aware seizure  Either way, she has responded well to increased Briviact, so she will continue her meds unchanged  We did discuss that in the future, we may want to consider decreasing one of there other medications (possibly Lamotrigine or Lacosamide) to try to simplify her regimen and potentially improve her morning dizziness, which likely represents peak dose toxicity  --She will continue her current medications unchanged  She is already on 4 medications, so at her next visit, we could consider trying to simplify her regimen, as above  --I will have her check medication levels to establish baseline levels prior to her next appointment    I did order a brivaracetam level, lamotrigine level, lacosamide level, and zonisamide level

## 2023-01-04 NOTE — PROGRESS NOTES
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”, who is returning to Neurology office for follow up of her seizures  Assessment/Plan:    Localization-related focal epilepsy with complex partial seizures ([facility] [address] )  She has not had any other seizures or sensations as if she was about to have a seizure since her last visit  She has been tolerating her medications well  It is not entirely clear if her left sided arm tightness is due to seizure, but it is possible this could be a focal aware seizure  Either way, she has responded well to increased Briviact, so she will continue her meds unchanged  We did discuss that in the future, we may want to consider decreasing one of there other medications (possibly Lamotrigine or Lacosamide) to try to simplify her regimen and potentially improve her morning dizziness, which likely represents peak dose toxicity  --She will continue her current medications unchanged  She is already on 4 medications, so at her next visit, we could consider trying to simplify her regimen, as above  --I will have her check medication levels to establish baseline levels prior to her next appointment  I did order a brivaracetam level, lamotrigine level, lacosamide level, and zonisamide level        She will Return in about 6 months (around 7/4/2023)  Subjective:    HPI  Current seizure medications:  1  Lacosamide 100 mg in the morning and 200 mg at night  2  Briviact 100 mg twice a day  3  Zonisamide 100 mg in the morning and 200 mg at night  4  Lamotrigine 200 mg twice a day   Other medications as per Epic  Since her last visit, she has been doing well with the increase in her Briviact   She does feel like she gets sleepy in the evenings more than before, typically falling asleep around 21:30  She doesn't feel this is a problem and when she wakes in the morning, she still will feel well rested  She will get some dizziness in the mornings with her morning medications if she doesn't take it with food  Prior Seizure Medications: Phentoin (changed due to potential long-term side effects), Levetiracetam (behavioral changes)       Objective:    Blood pressure 118/72, height 5' 5" (1 651 m), weight 76 7 kg (169 lb)  Physical Exam    Neurological Exam      ROS:    Review of Systems   Constitutional: Positive for fatigue  Negative for appetite change and fever  HENT: Negative  Negative for hearing loss, tinnitus, trouble swallowing and voice change  Eyes: Negative  Negative for photophobia, pain and visual disturbance  Respiratory: Negative  Negative for shortness of breath  Cardiovascular: Negative  Negative for palpitations  Gastrointestinal: Negative  Negative for nausea and vomiting  Endocrine: Negative  Negative for cold intolerance  Genitourinary: Negative  Negative for dysuria, frequency and urgency  Musculoskeletal: Negative  Negative for gait problem, myalgias and neck pain  Skin: Negative  Negative for rash  Allergic/Immunologic: Negative  Neurological: Negative  Negative for dizziness, tremors, seizures, syncope, facial asymmetry, speech difficulty, weakness, light-headedness, numbness and headaches  Hematological: Negative  Does not bruise/bleed easily  Psychiatric/Behavioral: Negative  Negative for confusion, hallucinations and sleep disturbance  All other systems reviewed and are negative  I personally reviewed the ROS that was entered by the medical assistant    Voice recognition software was used in the generation of this note  There may be unintentional errors including grammatical errors, spelling errors, or pronoun errors

## 2023-01-16 NOTE — OCCUPATIONAL THERAPY NOTE
Occupational Therapy Evaluation(time=1608-1630)     Patient Name: Margot Estrada  FBCWE'T Date: 4/29/2022  Problem List  Principal Problem:    Trimalleolar fracture of ankle, closed, left, initial encounter  Active Problems:    Localization-related focal epilepsy with complex partial seizures ([facility] [address] )    Essential hypertension    GERD (gastroesophageal reflux disease)    Cognitive changes    Past Medical History  Past Medical History:   Diagnosis Date    Hypertension     Seizure ([facility] [address] )     Thyroid disease      Past Surgical History  Past Surgical History:   Procedure Laterality Date    BRAIN SURGERY      HYSTERECTOMY      OVARIAN CYST SURGERY      WISDOM TOOTH EXTRACTION               04/29/22 1605   Note Type   Note type Evaluation   Restrictions/Precautions   Weight Bearing Precautions Per Order Yes   LLE Weight Bearing Per Order NWB   Other Precautions WBS; Fall Risk;Pain   Pain Assessment   Pain Assessment Tool 0-10   Pain Score 3   Pain Location/Orientation Location: Leg;Orientation: Left   Home Living   Type of [address] [place] [address] One level   Bathroom Shower/Tub Tub/shower unit   Bathroom Toilet Raised   Home Equipment Wheelchair-manual  (transport w/c, rollator)   Prior Function   Lives With [address] [place] [address] in the last 6 months 1 to 4  (1)   Lifestyle   Autonomy PTA pt states independence with her ADLs, transfers, ambulation--w/o device; +, +falls=1   Reciprocal Relationships supportive niece   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   Intrinsic Gratification watching TV   Psychosocial   Psychosocial (WDL) WDL   Subjective   Subjective "I need to get back to work "   ADL   Where Assessed Chair   Eating Assistance 6  Modified independent   Grooming Assistance 6  [address]  1000 [facility]  4  [address]  [address] Assistance   Transfers   Sit to Stand 4  Minimal assistance   Additional items Assist x 1; Increased time required;Verbal cues   Stand to Sit 4  Minimal assistance   Additional items Assist x 1; Increased time required   Functional Mobility   Functional Mobility 4  Minimal assistance   Additional Comments x1   Additional items Rolling walker   Balance   Static Sitting Normal   Dynamic Sitting Good   Static Standing Fair -   Dynamic Standing Poor +   Activity Tolerance   Activity Tolerance Patient limited by fatigue;Patient limited by pain   Medical Staff Made Aware nsg, P T     RUE Assessment   RUE Assessment WFL   RUE Strength   RUE Overall Strength Within Functional Limits - able to perform ADL tasks with strength  (4+/5 throughout)   LUE Assessment   LUE Assessment WFL   LUE Strength   LUE Overall Strength Within Functional Limits - able to perform ADL tasks with strength  (4+/5 throughout)   Hand Function   Gross Motor Coordination Functional   Fine Motor Coordination Functional   Sensation   Light Touch No apparent deficits   Proprioception   Proprioception No apparent deficits   Vision-Basic Assessment   Current Vision Wears glasses all the time   Vision - Complex Assessment   Acuity Able to read clock/calendar on wall without difficulty   Perception   Inattention/Neglect Appears intact   Cognition   Overall Cognitive Status WFL   Arousal/Participation Alert   Attention Within functional limits   Orientation Level Oriented X4   Memory Within functional limits   Following Commands Follows all commands and directions without difficulty   Assessment   Limitation Decreased ADL status; Decreased Safe judgement during ADL;Decreased endurance;Decreased high-level ADLs   Prognosis Good   Assessment Pt is a 58y/o female admitted to the hospital after falling down stairs in her home  Pt noted with L distal fibula fx, small avulsion fx--medial malleolus, posterior malleolus fx   Ortho recommend non-surgical approach, L LE NWB status with cast/splint  Pt with PMH HTN, seizures, and brain sx  PTA pt states independence with her ADLs, transfers, ambulation--w/o device; +, +falls=1  During initial eval, pt demonstrated deficits with her functional balance, functional mobility, ADL status, transfer safety, b/l UE strength, and activity tolerance(currently fair=15-20mins)  Pt would benefit from continued OT tx for the above deficits  3-5xwk/1-2wks  Goals   Patient Goals "to go home"   STG Time Frame   (1-5 days)   Short Term Goal #1 Pt will demonstrate proper walker/transfer safety(maintaining L LE NWB status) 100% of the time  Short Term Goal #2 Pt will demonstrate mod I with their sit-stand transfers to assist with completion of their LE dressing  Short Term Goal  Pt will demonstrate improved activity tolerance to good(20-30mins) and standing tolerance(maintaining L LE NWB status)  to 3-5mins to assist with ADLs  LTG Time Frame   (5-10days)   Long Term Goal #1 Pt will demonstrate mod I with their UE and LE bathing/dresssing  Long Term Goal #2 Pt will demonstrate g/g- balance with all functional activities  Long Term Goal Pt will demonstrate improved b/l UE strength by 1/2 MM grade to assist with ADLs/transfers  Plan   Treatment Interventions ADL retraining;Functional transfer training;UE strengthening/ROM; Endurance training;Patient/family training;Equipment evaluation/education; Compensatory technique education   Goal Expiration Date 05/10/22   OT Treatment Day 0   OT Frequency 3-5x/wk   Recommendation   OT Discharge Recommendation Post acute rehabilitation services   Equipment Recommended Bedside commode  (RW,w/c--20" with elevating leg rests)   AM-PAC Daily Activity Inpatient   Lower Body Dressing 3   Bathing 3   Toileting 3   Upper Body Dressing 4   Grooming 4   Eating 4   Daily Activity Raw Score 21   Daily Activity Standardized Score (Calc for Raw Score >=11) 44 27   AM-PAC Applied Cognition Inpatient Following a Speech/Presentation 4   Understanding Ordinary Conversation 4   Taking Medications 4   Remembering Where Things Are Placed or Put Away 4   Remembering List of 4-5 Errands 4   Taking Care of Complicated Tasks 4   Applied Cognition Raw Score 24   Applied Cognition Standardized Score 62 21   Adina Pickens, OT Post-Care Instructions: I reviewed with the patient in detail post-care instructions. Patient is not to engage in any heavy lifting, exercise, or swimming for the next 14 days. Should the patient develop any fevers, chills, bleeding, severe pain patient will contact the office immediately.

## 2023-05-02 NOTE — TELEPHONE ENCOUNTER
----- Message from Moe Amaya RN sent at 1/17/2022  7:57 AM EST -----  Regarding: FW: New Pharmacy & refills needed    ----- Message -----  From:  Rafat Massey  Sent: 1/16/2022   5:31 PM EST  To: Neu[facility]  Subject: [id number] [address] & refills needed                    Heres the original box from the 11/12 /21 refill Called pt spouse, Mi, in regards to patient discharge. Notified Mi that patient is to receive dialysis then be discharged home. She stated she will be at hospital around 7 PM. RN verified and patient will be sent home this evening.

## 2023-07-08 ENCOUNTER — APPOINTMENT (OUTPATIENT)
Dept: LAB | Age: 63
End: 2023-07-08
Payer: COMMERCIAL

## 2023-07-08 DIAGNOSIS — G40.209 LOCALIZATION-RELATED FOCAL EPILEPSY WITH COMPLEX PARTIAL SEIZURES (HCC): ICD-10-CM

## 2023-07-08 PROCEDURE — 80235 DRUG ASSAY LACOSAMIDE: CPT

## 2023-07-08 PROCEDURE — 80203 DRUG SCREEN QUANT ZONISAMIDE: CPT

## 2023-07-08 PROCEDURE — 80299 QUANTITATIVE ASSAY DRUG: CPT

## 2023-07-08 PROCEDURE — 36415 COLL VENOUS BLD VENIPUNCTURE: CPT

## 2023-07-08 PROCEDURE — 80175 DRUG SCREEN QUAN LAMOTRIGINE: CPT

## 2023-07-10 ENCOUNTER — OFFICE VISIT (OUTPATIENT)
Dept: NEUROLOGY | Facility: CLINIC | Age: 63
End: 2023-07-10
Payer: COMMERCIAL

## 2023-07-10 VITALS
SYSTOLIC BLOOD PRESSURE: 122 MMHG | HEIGHT: 65 IN | HEART RATE: 69 BPM | DIASTOLIC BLOOD PRESSURE: 79 MMHG | BODY MASS INDEX: 28.14 KG/M2 | WEIGHT: 168.9 LBS

## 2023-07-10 DIAGNOSIS — G40.209 LOCALIZATION-RELATED FOCAL EPILEPSY WITH COMPLEX PARTIAL SEIZURES (HCC): ICD-10-CM

## 2023-07-10 DIAGNOSIS — Q04.3 POLYMICROGYRIA (HCC): ICD-10-CM

## 2023-07-10 PROCEDURE — 99214 OFFICE O/P EST MOD 30 MIN: CPT | Performed by: PSYCHIATRY & NEUROLOGY

## 2023-07-10 RX ORDER — LACOSAMIDE 100 MG/1
TABLET ORAL
Qty: 270 TABLET | Refills: 1 | Status: SHIPPED | OUTPATIENT
Start: 2023-07-10

## 2023-07-10 RX ORDER — ZONISAMIDE 100 MG/1
CAPSULE ORAL
Qty: 270 CAPSULE | Refills: 3 | Status: SHIPPED | OUTPATIENT
Start: 2023-07-10

## 2023-07-10 RX ORDER — LAMOTRIGINE 200 MG/1
TABLET ORAL
Qty: 180 TABLET | Refills: 3 | Status: SHIPPED | OUTPATIENT
Start: 2023-07-10

## 2023-07-10 NOTE — PROGRESS NOTES
Review of Systems   Constitutional: Negative for appetite change, fatigue and fever. HENT: Negative. Negative for hearing loss, tinnitus, trouble swallowing and voice change. Eyes: Negative. Negative for photophobia, pain and visual disturbance. Respiratory: Negative. Negative for shortness of breath. Cardiovascular: Negative. Negative for palpitations. Gastrointestinal: Negative. Negative for nausea and vomiting. Endocrine: Negative. Negative for cold intolerance. Genitourinary: Negative. Negative for dysuria, frequency and urgency. Musculoskeletal: Negative for back pain, gait problem, myalgias and neck pain. Skin: Negative. Negative for rash. Allergic/Immunologic: Negative. Neurological: Negative. Negative for dizziness, tremors, seizures, syncope, facial asymmetry, speech difficulty, weakness, light-headedness, numbness and headaches. Hematological: Negative. Does not bruise/bleed easily. Psychiatric/Behavioral: Negative. Negative for confusion, hallucinations and sleep disturbance.

## 2023-07-10 NOTE — ASSESSMENT & PLAN NOTE
Overall she has been doing quite well on her current medications. She is no longer experiencing any peak dose side effects with her current medications. She is even eating a little bit less in the mornings and not having any difficulty. She did report 1 possible or a few months ago, but this did not progressed anything different and no one around her is noted any change in her behavior. --She will continue her current medications unchanged. We did discuss the possibility of decreasing either lacosamide or lamotrigine slightly to improve her side effects, but these are not currently occurring so there is no urgency to change her medicines today. --She did have blood work checked 2 days ago to recheck her medication levels, but the results are still pending. These levels are mainly to service baselines, so unless there is something very unexpected, nothing likely needs to be changed based off of these medication levels.

## 2023-07-10 NOTE — PROGRESS NOTES
Patient ID: Chinyere Pires is a 65 y.o. 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 Assessment/Plan:    Localization-related focal epilepsy with complex partial seizures ([address])  Overall she has been doing quite well on her current medications. She is no longer experiencing any peak dose side effects with her current medications. She is even eating a little bit less in the mornings and not having any difficulty. She did report 1 possible or a few months ago, but this did not progressed anything different and no one around her is noted any change in her behavior. --She will continue her current medications unchanged. We did discuss the possibility of decreasing either lacosamide or lamotrigine slightly to improve her side effects, but these are not currently occurring so there is no urgency to change her medicines today. --She did have blood work checked 2 days ago to recheck her medication levels, but the results are still pending. These levels are mainly to service baselines, so unless there is something very unexpected, nothing likely needs to be changed based off of these medication levels. She will Return in about 6 months (around 1/10/2024). Subjective:    HPI  Current seizure medications:  1. Lacosamide 100 mg in the morning and 200 mg at night  2. Briviact 100 mg twice a day  3. Zonisamide 100 mg in the morning and 200 mg at night  4. Lamotrigine 200 mg twice a day   Other medications as per [facility]. Since her last visit, she denies any definite seizures. She had a sensation similar to her prior aura in the springtime.  She was with friends doing water aerobics and her friend noted that she was doing everything appropriately and she didn't notice any difference in her behavior. Lisa Kent remembers all of the event and also denies this progressing to anything further. She hasn't been noticing the side effects that she noticed in the past. She feels she has gained some weight and wasn't as active, so she gained some weight. She noticed that she is seeing more hair falling out in the drain than she did in the past. She hasn't noticed that it is thinner. This has just been recent. She got blood work drawn on 7/8/2023. Prior Seizure Medications: Phentoin (changed due to potential long-term side effects), Levetiracetam (behavioral changes)       Objective:    Blood pressure 122/79, pulse 69, height 5' 5" (1.651 m), weight 76.6 kg (168 lb 14.4 oz). Physical Exam    Neurological Exam      ROS:    Review of Systems    I personally reviewed the ROS that was entered by the medical assistant in a separate note. Voice recognition software was used in the generation of this note. There may be unintentional errors including grammatical errors, spelling errors, or pronoun errors.

## 2023-07-11 LAB
LAMOTRIGINE SERPL-MCNC: 8.6 UG/ML (ref 2–20)
ZONISAMIDE SERPL-MCNC: 21.8 UG/ML (ref 10–40)

## 2023-07-12 LAB — LACOSAMIDE SERPL-MCNC: 7.8 UG/ML (ref 5–10)

## 2023-07-17 LAB — MISCELLANEOUS LAB TEST RESULT: NORMAL

## 2023-09-18 ENCOUNTER — OFFICE VISIT (OUTPATIENT)
Dept: URGENT CARE | Age: 63
End: 2023-09-18
Payer: COMMERCIAL

## 2023-09-18 VITALS
RESPIRATION RATE: 18 BRPM | SYSTOLIC BLOOD PRESSURE: 118 MMHG | TEMPERATURE: 98.2 F | DIASTOLIC BLOOD PRESSURE: 74 MMHG | HEART RATE: 78 BPM | OXYGEN SATURATION: 99 %

## 2023-09-18 DIAGNOSIS — R42 VERTIGO: Primary | ICD-10-CM

## 2023-09-18 PROCEDURE — 99213 OFFICE O/P EST LOW 20 MIN: CPT

## 2023-09-18 RX ORDER — MECLIZINE HCL 12.5 MG/1
12.5 TABLET ORAL 3 TIMES DAILY PRN
Qty: 30 TABLET | Refills: 0 | Status: SHIPPED | OUTPATIENT
Start: 2023-09-18

## 2023-09-18 NOTE — PROGRESS NOTES
[facility]        NAME: Radha Dunbar is a 65 y.o. female  : 1960    MRN: [id number]  DATE: 2023  TIME: 11:54 AM    Assessment and Plan   Vertigo [R42]  1. Vertigo  meclizine (ANTIVERT) 12.5 MG tablet        Pt woke up and when she turned her head quickly had an episode of dizziness. Has resolved some. Denies fevers, congestion. Has b/l effusions. No CP, numbness/tingling. Discussed treating as vertigo and PCP/neurologist f/u. Gait steady. Neuro intact. Patient Instructions       Follow up with PCP as needed    Chief Complaint     Chief Complaint   Patient presents with   • Dizziness     Dizziness starting this AM after rolling over in bed. Worsens when turning head to the side. States this has happened before but has not been this severe         History of Present Illness       Pt woke up and when she turned her head quickly had an episode of dizziness. Has resolved some. Denies fevers, congestion. Has b/l effusions. No CP, numbness/tingling. Discussed treating as vertigo and PCP/neurologist f/u. Gait steady. Neuro intact. Review of Systems   Review of Systems   Constitutional: Negative for activity change and fever. HENT: Negative for congestion. Respiratory: Negative for cough. Neurological: Positive for dizziness. Negative for tremors, seizures, syncope and numbness. All other systems reviewed and are negative.         Current Medications       Current Outpatient Medications:   •  Brivaracetam 100 MG TABS, Take 1 tablet (100 mg total) by mouth 2 (two) times a day, Disp: 270 tablet, Rfl: 1  •  Calcium Carbonate-Vitamin D 600-200 MG-UNIT TABS, Take 1 tablet by mouth 2 (two) times a day, Disp: , Rfl:   •  Cholecalciferol 50 MCG ( [place]) CAPS, Take 1 capsule by mouth in the morning., Disp: , Rfl:   •  Cyanocobalamin (B-12) 1000 MCG CAPS, in the morning, Disp: , Rfl:   •  lacosamide (Vimpat) 100 mg tablet, Take 1 tablet (100 mg total) by mouth every morning AND 2 tablets (200 mg total) every evening., Disp: 270 tablet, Rfl: 1  •  lamoTRIgine (LaMICtal) 200 MG tablet, TAKE 1 TABLET TWICE A DAY, Disp: 180 tablet, Rfl: 3  •  meclizine (ANTIVERT) 12.5 MG tablet, Take 1 tablet (12.5 mg total) by mouth 3 (three) times a day as needed for dizziness, Disp: 30 tablet, Rfl: 0  •  zonisamide (ZONEGRAN) 100 mg capsule, Taking 100mg in am and 200mg at night, Disp: 270 capsule, Rfl: 3  •  pantoprazole (PROTONIX) 40 mg tablet, Take 40 mg by mouth daily, Disp: , Rfl:     Current Allergies     Allergies as of 09/18/2023   • (No Known Allergies)            The following portions of the patient's history were reviewed and updated as appropriate: allergies, current medications, past family history, past medical history, past social history, past surgical history and problem list.     Past Medical History:   Diagnosis Date   • Hypertension    • Seizure ([address])    • Thyroid disease        Past Surgical History:   Procedure Laterality Date   • BRAIN SURGERY     • CRANIOTOMY  12/28/1987   • HYSTERECTOMY     • OVARIAN CYST SURGERY     • [place] OPEN TX DISTAL FIBULAR FRACTURE LAT MALLEOLUS Left 05/23/2022    Procedure: OPEN REDUCTION W/ INTERNAL FIXATION (ORIF) ANKLE;  Surgeon: Cookie Graham MD;  Location:  MAIN OR;  Service: Orthopedics   • WISDOM TOOTH EXTRACTION         Family History   Problem Relation Age of Onset   • Seizures Mother    • Stroke Mother    • Thyroid disease Mother    • Heart disease Father          Medications have been verified. Objective   /74   Pulse 78   Temp 98.2 °F (36.8 °C)   Resp 18   SpO2 99%   No LMP recorded. Physical Exam     Physical Exam  Vitals reviewed. Constitutional:       Appearance: Normal appearance. HENT:      Right Ear: A middle ear effusion is present. Left Ear: A middle ear effusion is present. Cardiovascular:      Rate and Rhythm: Normal rate and regular rhythm. Pulses: Normal pulses.       Heart sounds: Normal heart sounds. Pulmonary:      Effort: Pulmonary effort is normal.   Musculoskeletal:         General: Normal range of motion. Lymphadenopathy:      Cervical: No cervical adenopathy. Skin:     General: Skin is warm and dry. Capillary Refill: Capillary refill takes less than 2 seconds. Neurological:      General: No focal deficit present. Mental Status: She is alert and oriented to person, place, and time. Mental status is at baseline. Motor: No weakness.       Coordination: Coordination normal.      Gait: Gait normal.

## 2024-01-07 ENCOUNTER — OFFICE VISIT (OUTPATIENT)
Dept: URGENT CARE | Age: 64
End: 2024-01-07
Payer: COMMERCIAL

## 2024-01-07 VITALS
TEMPERATURE: 98.4 F | RESPIRATION RATE: 20 BRPM | BODY MASS INDEX: 27.87 KG/M2 | SYSTOLIC BLOOD PRESSURE: 175 MMHG | WEIGHT: 173.4 LBS | DIASTOLIC BLOOD PRESSURE: 83 MMHG | HEIGHT: 66 IN

## 2024-01-07 DIAGNOSIS — T23.209A SUPERFICIAL PARTIAL THICKNESS BURN OF HAND: Primary | ICD-10-CM

## 2024-01-07 PROCEDURE — 99213 OFFICE O/P EST LOW 20 MIN: CPT

## 2024-01-07 RX ORDER — CALCIUM CARBONATE/VITAMIN D3 600 MG-10
1 TABLET ORAL 2 TIMES DAILY
COMMUNITY

## 2024-01-07 NOTE — LETTER
January 7, 2024     Patient: Vernell Prescott   YOB: 1960   Date of Visit: 1/7/2024       To Whom it May Concern:    Vernell Prescott was seen in my clinic on 1/7/2024. She may return to work on 1/11/2023 .    If you have any questions or concerns, please don't hesitate to call.         Sincerely,          Amanda Hernandez PA-C        CC: No Recipients

## 2024-01-07 NOTE — LETTER
January 7, 2024     Patient: Vernell Prescott   YOB: 1960   Date of Visit: 1/7/2024       To Whom it May Concern:    Vernell Prescott was seen in my clinic on 1/7/2024. She may return to work on 1/15/2023 .    If you have any questions or concerns, please don't hesitate to call.         Sincerely,          Amanda Hernandez PA-C        CC: No Recipients

## 2024-01-08 ENCOUNTER — TELEPHONE (OUTPATIENT)
Dept: URGENT CARE | Age: 64
End: 2024-01-08

## 2024-01-08 NOTE — PATIENT INSTRUCTIONS
May apply bacitracin over affected area  Do not pop the blister that is formed  Clean dressings daily  Watch for signs of infection as discussed.  Follow up with PCP in 3-5 days.  Proceed to  ER if symptoms worsen.
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
## 2024-02-08 DIAGNOSIS — G40.209 LOCALIZATION-RELATED FOCAL EPILEPSY WITH COMPLEX PARTIAL SEIZURES (HCC): ICD-10-CM

## 2024-02-11 RX ORDER — LACOSAMIDE 100 MG/1
TABLET ORAL
Qty: 270 TABLET | Refills: 0 | Status: SHIPPED | OUTPATIENT
Start: 2024-02-11

## 2024-04-23 DIAGNOSIS — G40.209 LOCALIZATION-RELATED FOCAL EPILEPSY WITH COMPLEX PARTIAL SEIZURES (HCC): ICD-10-CM

## 2024-04-23 RX ORDER — LACOSAMIDE 100 MG/1
TABLET ORAL
Qty: 270 TABLET | Refills: 0 | Status: SHIPPED | OUTPATIENT
Start: 2024-04-23

## 2024-05-01 ENCOUNTER — APPOINTMENT (OUTPATIENT)
Dept: RADIOLOGY | Age: 64
End: 2024-05-01
Payer: COMMERCIAL

## 2024-05-01 ENCOUNTER — OFFICE VISIT (OUTPATIENT)
Dept: URGENT CARE | Age: 64
End: 2024-05-01
Payer: COMMERCIAL

## 2024-05-01 VITALS
DIASTOLIC BLOOD PRESSURE: 66 MMHG | SYSTOLIC BLOOD PRESSURE: 122 MMHG | WEIGHT: 165.6 LBS | BODY MASS INDEX: 27.59 KG/M2 | RESPIRATION RATE: 18 BRPM | TEMPERATURE: 98.1 F | OXYGEN SATURATION: 98 % | HEART RATE: 87 BPM | HEIGHT: 65 IN

## 2024-05-01 DIAGNOSIS — S82.891A CLOSED FRACTURE OF RIGHT ANKLE, INITIAL ENCOUNTER: Primary | ICD-10-CM

## 2024-05-01 DIAGNOSIS — S82.301A CLOSED FRACTURE OF DISTAL END OF RIGHT TIBIA, UNSPECIFIED FRACTURE MORPHOLOGY, INITIAL ENCOUNTER: Primary | ICD-10-CM

## 2024-05-01 DIAGNOSIS — M25.571 ACUTE RIGHT ANKLE PAIN: ICD-10-CM

## 2024-05-01 PROCEDURE — 73610 X-RAY EXAM OF ANKLE: CPT

## 2024-05-01 PROCEDURE — 99213 OFFICE O/P EST LOW 20 MIN: CPT

## 2024-05-01 PROCEDURE — 73630 X-RAY EXAM OF FOOT: CPT

## 2024-05-01 RX ORDER — PANTOPRAZOLE SODIUM 40 MG/1
40 TABLET, DELAYED RELEASE ORAL DAILY
COMMUNITY
Start: 2024-02-12

## 2024-05-01 NOTE — PROGRESS NOTES
[facility]        NAME: Vernell Prescott is a 63 y.o. female  : 1960    MRN: [id number]  DATE: May 8, 2024  TIME: 1:42 PM    Assessment and Plan   Closed fracture of right ankle, initial encounter [S82.891A]  1. Closed fracture of right ankle, initial encounter        2. Acute right ankle pain  XR foot 3+ vw right    XR ankle 3+ vw right        Pt presents for eval of right ankle pain- on steps yesterday. Pain/swelling noted. Has been using compression, taking OTC meds and using ice. States pain worse with first movements, improves once moving. Xray notes no obvious fracture- will await radiology report. Declined wrap, brace or boot- states has all at home.     - radiology report noted distal fibula fracture. Pt notified. Ortho referral placed.     Patient Instructions       Follow up with PCP in 3-5 days.  Proceed to  ER if symptoms worsen.    If tests have been performed at [facility], our office will contact you with results if changes need to be made to the care plan discussed with you at the visit.  [facility]    Chief Complaint     Chief Complaint   Patient presents with    Ankle Pain     Patient reports that last night she was coming down her steps, missed last step and fell on to her right foot. Has been having foot pain and swelling since incident.         History of Present Illness       Pt presents for eval of right ankle pain- on steps yesterday. Pain/swelling noted. Has been using compression, taking OTC meds and using ice. States pain worse with first movements, improves once moving. Xray notes no obvious fracture- will await radiology report. Declined wrap, brace or boot- states has all at home.     Ankle Pain         Review of Systems   Review of Systems   Musculoskeletal:  Positive for arthralgias and gait problem.   All other systems reviewed and are negative.        Current Medications       Current Outpatient Medications:      pantoprazole (PROTONIX) 40 mg tablet, Take 40 mg by mouth daily, Disp: , Rfl:     Brivaracetam 100 MG TABS, Take 1 tablet (100 mg total) by mouth 2 (two) times a day, Disp: 270 tablet, Rfl: 0    Calcium Carb-Cholecalciferol 600-10 MG-MCG TABS, Take 1 tablet by mouth 2 (two) times a day (Patient not taking: Reported on 1/7/2024), Disp: , Rfl:     Calcium Carbonate-Vitamin D 600-200 MG-UNIT TABS, Take 1 tablet by mouth 2 (two) times a day, Disp: , Rfl:     Cholecalciferol 50 MCG (2000 UT) CAPS, Take 1 capsule by mouth in the morning., Disp: , Rfl:     Cyanocobalamin (B-12) 1000 MCG CAPS, in the morning, Disp: , Rfl:     lacosamide (VIMPAT) 100 mg tablet, TAKE 1 TABLET EVERY MORNING AND 2 TABLETS (200 MG) EVERY EVENING, Disp: 270 tablet, Rfl: 0    lamoTRIgine (LaMICtal) 200 MG tablet, TAKE 1 TABLET TWICE A DAY, Disp: 180 tablet, Rfl: 3    meclizine (ANTIVERT) 12.5 MG tablet, Take 1 tablet (12.5 mg total) by mouth 3 (three) times a day as needed for dizziness, Disp: 30 tablet, Rfl: 0    zonisamide (ZONEGRAN) 100 mg capsule, Taking 100mg in am and 200mg at night, Disp: 270 capsule, Rfl: 3    Current Allergies     Allergies as of 05/01/2024    (No Known Allergies)            The following portions of the patient's history were reviewed and updated as appropriate: allergies, current medications, past family history, past medical history, past social history, past surgical history and problem list.     Past Medical History:   Diagnosis Date    Hypertension     Seizure (HCC)     Thyroid disease        Past Surgical History:   Procedure Laterality Date    BRAIN SURGERY      CRANIOTOMY  12/28/1987    HYSTERECTOMY      OVARIAN CYST SURGERY      SC OPEN TX DISTAL FIBULAR FRACTURE LAT MALLEOLUS Left 05/23/2022    Procedure: OPEN REDUCTION W/ INTERNAL FIXATION (ORIF) ANKLE;  Surgeon: James R Lachman, MD;  Location:  MAIN OR;  Service: Orthopedics    WISDOM TOOTH EXTRACTION         Family History   Problem Relation Age of Onset  "   Seizures Mother     Stroke Mother     Thyroid disease Mother     Heart disease Father          Medications have been verified.        Objective   /66   Pulse 87   Temp 98.1 °F (36.7 °C) (Tympanic)   Resp 18   Ht 5' 5\" (1.651 m)   Wt 75.1 kg (165 lb 9.6 oz)   SpO2 98%   BMI 27.56 kg/m²   No LMP recorded.       Physical Exam     Physical Exam  Vitals reviewed.   Constitutional:       Appearance: Normal appearance.   Musculoskeletal:         General: Swelling, tenderness and signs of injury present.   Skin:     General: Skin is warm and dry.      Capillary Refill: Capillary refill takes less than 2 seconds.      Findings: Bruising present. No erythema.   Neurological:      General: No focal deficit present.      Mental Status: She is alert. Mental status is at baseline.                   "

## 2024-05-07 ENCOUNTER — OFFICE VISIT (OUTPATIENT)
Dept: OBGYN CLINIC | Facility: CLINIC | Age: 64
End: 2024-05-07
Payer: COMMERCIAL

## 2024-05-07 VITALS
WEIGHT: 165 LBS | SYSTOLIC BLOOD PRESSURE: 122 MMHG | BODY MASS INDEX: 27.49 KG/M2 | DIASTOLIC BLOOD PRESSURE: 66 MMHG | HEIGHT: 65 IN | HEART RATE: 87 BPM

## 2024-05-07 DIAGNOSIS — S82.831A OTHER CLOSED FRACTURE OF DISTAL END OF RIGHT FIBULA, INITIAL ENCOUNTER: Primary | ICD-10-CM

## 2024-05-07 PROCEDURE — 99213 OFFICE O/P EST LOW 20 MIN: CPT | Performed by: ORTHOPAEDIC SURGERY

## 2024-05-07 NOTE — PATIENT INSTRUCTIONS
Weightbearing as tolerated in CAM boot  Do not need to wear the boot for sleep or showering but should wear it any time you are walking on it.    Recommend taking the following supplements: Vitamin D 4000 units per day and Calcium 1200 mg per day. This will help with bone healing.     Avoid NSAIDs like Motrin/Aleve/Ibuprofen.  Avoid steroids/nicotine products/ rheumatoid medications like DMARDs if possible.    Aspirin 81mg for blood clot prevention.     Knee high compression stocking 20-30mm HG of pressure

## 2024-05-07 NOTE — PROGRESS NOTES
James R Lachman, M.D.  Attending, Orthopaedic Surgery  Foot and Ankle  [facility]        ORTHOPAEDIC FOOT AND ANKLE CLINIC VISIT     Assessment:     Encounter Diagnosis   Name Primary?    Closed fracture of distal end of right tibia, unspecified fracture morphology, initial encounter         Plan:   The patient verbalized understanding of exam findings and treatment plan. We engaged in the shared decision-making process and treatment options were discussed at length with the patient. Surgical and conservative management discussed today along with risks and benefits.  Inversion injury to left ankle 1 week ago   Seen at urgent care 5/1/2024, XR revealed nondisplaced presley A distal fibula fracture  WBAT in CAM boot  Vit D and Calcium  See back IN 6 weeks with repeat Xrays      History of Present Illness:   Chief Complaint:   Chief Complaint   Patient presents with    Right Ankle - Pain     Rolled ankle and saying its broke. Happened April 30 and walking on it.      Vernell Prescott is a 63 y.o. female who is being seen for right ankle fracture. Inversion injury 1 week ago, fell downstairs.  Pain is localized at lateral ankle with minimal radiating and described as sharp and severe. Patient denies numbness, tingling or radicular pain.  Denies history of neuropathy.  Patient does not smoke, does not have diabetes and does not take blood thinners.  Patient denies family history of anesthesia complications and has not had any complications with anesthesia.     Pain/symptom timing:  Worse during the day when active  Pain/symptom context:  Worse with activites and work  Pain/symptom modifying factors:  Rest makes better, activities make worse  Pain/symptom associated signs/symptoms: none    Prior treatment   NSAIDsNo    Injections No   Bracing/Orthotics Yes   Physical Therapy No     Orthopedic Surgical History:   See below     Past Medical, Surgical and Social History:  Past Medical History:  has a  "past medical history of Hypertension, Seizure (HCC), and Thyroid disease.  Problem List: does not have any pertinent problems on file.  Past Surgical History:  has a past surgical history that includes Brain surgery; Ovarian cyst surgery; Hysterectomy; [place] tooth extraction; pr open tx distal fibular fracture lat malleolus (Left, 05/23/2022); and Craniotomy (12/28/1987).  Family History: family history includes Heart disease in her father; Seizures in her mother; Stroke in her mother; Thyroid disease in her mother.  Social History:  reports that she quit smoking about 21 years ago. Her smoking use included cigarettes. She started smoking about 31 years ago. She has a 5 pack-year smoking history. She has never used smokeless tobacco. She reports that she does not drink alcohol and does not use drugs.  Current Medications: has a current medication list which includes the following prescription(s): brivaracetam, calcium carbonate-vitamin d, cholecalciferol, b-12, lacosamide, lamotrigine, meclizine, pantoprazole, zonisamide, and calcium carb-cholecalciferol.  Allergies: has No Known Allergies.     Review of Systems:  General- denies fever/chills  HEENT- denies hearing loss or sore throat  Eyes- denies eye pain or visual disturbances, denies red eyes  Respiratory- denies cough or SOB  Cardio- denies chest pain or palpitations  GI- denies abdominal pain  Endocrine- denies urinary frequency  Urinary- denies pain with urination  Musculoskeletal- Negative except noted above  Skin- denies rashes or wounds  Neurological- denies dizziness or headache  Psychiatric- denies anxiety or difficulty concentrating    Physical Exam:   /66   Pulse 87   Ht 5' 5\" (1.651 m)   Wt 74.8 kg (165 lb)   BMI 27.46 kg/m²   General/Constitutional: No apparent distress: well-nourished and well developed.  Eyes: normal ocular motion  Cardio: RRR, Normal S1S2, No m/r/g  Lymphatic: No appreciable lymphadenopathy  Respiratory: Non-labored " breathing, CTA b/l no w/c/r  Vascular: No edema, swelling or tenderness, except as noted in detailed exam.  Integumentary: No impressive skin lesions present, except as noted in detailed exam.  Neuro: No ataxia or tremors noted  Psych: Normal mood and affect, oriented to person, place and time. Appropriate affect.  Musculoskeletal: Normal, except as noted in detailed exam and in HPI.    Examination    Right    Gait Antalgic and Shuffling   Musculoskeletal Tender to palpation at lateral malleolus    Skin Mild swelling and ecchymosis.      Nails Normal    Range of Motion  20 degrees dorsiflexion, 30 degrees plantarflexion  Subtalar motion: normal     Stability Stable    Muscle Strength 5/5 tibialis anterior  5/5 gastrocnemius-soleus  5/5 posterior tibialis  5/5 peroneal/eversion strength  5/5 EHL  5/5 FHL    Neurologic Normal    Sensation  Intact to light touch throughout sural, saphenous, superficial peroneal, deep peroneal and medial/lateral plantar nerve distributions.  [place]-Jul[place]te 5.07 filament (10g) testing  deferred.    Cardiovascular Brisk capillary refill < 2 seconds,intact DP and PT pulses    Special Tests None      Imaging Studies:   3 views of the right ankle were available for review and demonstrate non-displaced distal fibula fracture. Reviewed by me personally.    Scribe Attestation      I,:  Marleny Adler PA-C am acting as a scribe while in the presence of the attending physician.:       I,:  James R Lachman, MD personally performed the services described in this documentation    as scribed in my presence.:               James R. Lachman, MD  Foot & Ankle Surgery   Department of Orthopaedic Surgery  [facility]      I personally performed the service.    James R. Lachman, MD

## 2024-05-22 DIAGNOSIS — G40.209 LOCALIZATION-RELATED FOCAL EPILEPSY WITH COMPLEX PARTIAL SEIZURES (HCC): ICD-10-CM

## 2024-05-22 RX ORDER — BRIVARACETAM 100 MG/1
1 TABLET, FILM COATED ORAL 2 TIMES DAILY
Qty: 270 TABLET | Refills: 0 | Status: SHIPPED | OUTPATIENT
Start: 2024-05-22

## 2024-06-12 ENCOUNTER — OFFICE VISIT (OUTPATIENT)
Dept: OBGYN CLINIC | Facility: CLINIC | Age: 64
End: 2024-06-12
Payer: COMMERCIAL

## 2024-06-12 ENCOUNTER — APPOINTMENT (OUTPATIENT)
Dept: RADIOLOGY | Facility: AMBULARY SURGERY CENTER | Age: 64
End: 2024-06-12
Attending: ORTHOPAEDIC SURGERY
Payer: COMMERCIAL

## 2024-06-12 VITALS
WEIGHT: 160 LBS | DIASTOLIC BLOOD PRESSURE: 75 MMHG | HEART RATE: 69 BPM | HEIGHT: 65 IN | SYSTOLIC BLOOD PRESSURE: 122 MMHG | BODY MASS INDEX: 26.66 KG/M2

## 2024-06-12 DIAGNOSIS — S82.831A OTHER CLOSED FRACTURE OF DISTAL END OF RIGHT FIBULA, INITIAL ENCOUNTER: ICD-10-CM

## 2024-06-12 DIAGNOSIS — Z01.89 ENCOUNTER FOR LOWER EXTREMITY COMPARISON IMAGING STUDY: ICD-10-CM

## 2024-06-12 DIAGNOSIS — S82.831A OTHER CLOSED FRACTURE OF DISTAL END OF RIGHT FIBULA, INITIAL ENCOUNTER: Primary | ICD-10-CM

## 2024-06-12 PROCEDURE — 73600 X-RAY EXAM OF ANKLE: CPT

## 2024-06-12 PROCEDURE — 99213 OFFICE O/P EST LOW 20 MIN: CPT | Performed by: ORTHOPAEDIC SURGERY

## 2024-06-12 PROCEDURE — 73610 X-RAY EXAM OF ANKLE: CPT

## 2024-06-12 NOTE — PATIENT INSTRUCTIONS
You may begin weaning your boot and transitioning to a sneaker (Today). It is important to do this gradually to avoid aggravating the healing process.    Today, you may come out of the boot into a sneaker for 2 hours.  2. Tomorrow, you may come out of the boot into a sneaker for 4 hours,  3. The next day, you may come out of the boot into a sneaker for 6 hours.  4. Continue this (adding 2 hours per day) as you tolerate. For example, if you do 6 hours out of the boot into a sneaker and your foot swells more than usual at night and it is difficult to control the discomfort, do not advance to 8 hours the next day, stay at 6 hours until you are able to tolerate it.    It is essential to follow this protocol and not modify this.  No matter when you begin weaning the boot, it will be difficult and there will be swelling and soreness.  If you spend more time than is recommended in the boot, this process becomes longer and more painful.    Elevation, Ice and tylenol and staying off of it at night will be important to aide in this transition out of the boot. Swelling and soreness are normal as you begin to do more with the injured leg.    May DC aspirin/lovenox, no longer needed.  Compression stocking (Knee high, 20-30mm Hg) to be worn at all times while awake.  Recommend taking the following supplements: Vitamin D 4000 units per day and Calcium 1200 mg per day. This will help with bone healing.

## 2024-06-12 NOTE — PROGRESS NOTES
James R Lachman, M.D.  Attending, Orthopaedic Surgery  Foot and Ankle  [facility]      ORTHOPAEDIC FOOT AND ANKLE CLINIC VISIT     Assessment:     Encounter Diagnoses   Name Primary?    Other closed fracture of distal end of right fibula, initial encounter Yes    Encounter for lower extremity comparison imaging study          Plan:   The patient verbalized understanding of exam findings and treatment plan. We engaged in the shared decision-making process and treatment options were discussed at length with the patient. Surgical and conservative management discussed today along with risks and benefits.  Sustained non-displaced presley A right distal fibula fracture from inversion injury 4/30/2024  Managing non-operatively  XRs today are stable   WBAT- will wean boot, instructions in AVS  Continue vit D and calcium. Discontinue aspirin  OTC pain medication, ice, elevation, compression stocking for pain and swelling control   Return in about 2 months (around 8/12/2024).      History of Present Illness:   Chief Complaint:   Chief Complaint   Patient presents with    Follow-up     Follow up on the right ankle in cam boot and walking.      Vernell Prescott is a 63 y.o. female who is being seen in follow-up for Right distal . When we last saw she we recommended WBAT in cam boot.  Pain has  improved. Residual pain is localized at lateral ankle with minimal radiating and described as sharp and severe.      Pain/symptom timing:  Worse during the day when active  Pain/symptom context:  Worse with activites and work  Pain/symptom modifying factors:  Rest makes better, activities make worse  Pain/symptom associated signs/symptoms: none    Prior treatment   NSAIDsNo   Injections No   Bracing/Orthotics No    Physical Therapy No     Orthopedic Surgical History:   See below     Past Medical, Surgical and Social History:  Past Medical History:  has a past medical history of Hypertension, Seizure (HCC), and  "Thyroid disease.  Problem List: does not have any pertinent problems on file.  Past Surgical History:  has a past surgical history that includes Brain surgery; Ovarian cyst surgery; Hysterectomy; [place] tooth extraction; pr open tx distal fibular fracture lat malleolus (Left, 05/23/2022); and Craniotomy (12/28/1987).  Family History: family history includes Heart disease in her father; Seizures in her mother; Stroke in her mother; Thyroid disease in her mother.  Social History:  reports that she quit smoking about 21 years ago. Her smoking use included cigarettes. She started smoking about 31 years ago. She has a 5 pack-year smoking history. She has never used smokeless tobacco. She reports that she does not drink alcohol and does not use drugs.  Current Medications: has a current medication list which includes the following prescription(s): briviact, calcium carbonate-vitamin d, cholecalciferol, b-12, lacosamide, lamotrigine, meclizine, pantoprazole, zonisamide, and calcium carb-cholecalciferol.  Allergies: has No Known Allergies.     Review of Systems:  General- denies fever/chills  HEENT- denies hearing loss or sore throat  Eyes- denies eye pain or visual disturbances, denies red eyes  Respiratory- denies cough or SOB  Cardio- denies chest pain or palpitations  GI- denies abdominal pain  Endocrine- denies urinary frequency  Urinary- denies pain with urination  Musculoskeletal- Negative except noted above  Skin- denies rashes or wounds  Neurological- denies dizziness or headache  Psychiatric- denies anxiety or difficulty concentrating    Physical Exam:   /75   Pulse 69   Ht 5' 5\" (1.651 m)   Wt 72.6 kg (160 lb)   BMI 26.63 kg/m²   General/Constitutional: No apparent distress: well-nourished and well developed.  Eyes: normal ocular motion  Lymphatic: No appreciable lymphadenopathy  Respiratory: Non-labored breathing  Vascular: No edema, swelling or tenderness, except as noted in detailed " exam.  Integumentary: No impressive skin lesions present, except as noted in detailed exam.  Neuro: No ataxia or tremors noted  Psych: Normal mood and affect, oriented to person, place and time. Appropriate affect.  Musculoskeletal: Normal, except as noted in detailed exam and in HPI.    Examination    Right    Gait Antalgic in cam boot    Musculoskeletal Mild ttp at distal fibula fracture site    Skin Normal.      Nails Normal    Range of Motion  20 degrees dorsiflexion, 30 degrees plantarflexion  Subtalar motion: normal    Stability Stable    Muscle Strength 5/5 tibialis anterior  5/5 gastrocnemius-soleus  5/5 posterior tibialis  5/5 peroneal/eversion strength  5/5 EHL  5/5 FHL    Neurologic Normal    Sensation  Intact to light touch throughout sural, saphenous, superficial peroneal, deep peroneal and medial/lateral plantar nerve distributions.  [place]te 5.07 filament (10g) testing  deferred.    Cardiovascular Brisk capillary refill < 2 seconds,intact DP and PT pulses    Special Tests None      Imaging Studies:   3 views of the Right ankle were obtained, reviewed and interpreted independently which demonstrate adequate interval healing of distal fibula fracture without signs of displacement. Reviewed by me personally.      Scribe Attestation      I,:  Marleny Adler PA-C am acting as a scribe while in the presence of the attending physician.:       I,:  James R Lachman, MD personally performed the services described in this documentation    as scribed in my presence.:              James R. Lachman, MD  Foot & Ankle Surgery   Department of Orthopaedic Surgery  [facility]      I personally performed the service.    James R. Lachman, MD

## 2024-07-13 DIAGNOSIS — G40.209 LOCALIZATION-RELATED FOCAL EPILEPSY WITH COMPLEX PARTIAL SEIZURES (HCC): ICD-10-CM

## 2024-07-18 NOTE — TELEPHONE ENCOUNTER
Medication: lacosamide (VIMPAT)     Dose/Frequency: 100 mg tablet, take 1 tablet every morning and 2 tablets every evening.     Quantity: 270 tablets    Pharmacy: [facility]     Office:   [] PCP/Provider -   [x] Speciality/Provider - Neurology, Dr. Novoa     Does the patient have enough for 3 days?   [x] Yes   [] No - Send as HP to POD     NOV 11/26/24    Dr. Novoa: medication pended, please sign if agreeable

## 2024-07-19 RX ORDER — LACOSAMIDE 100 MG/1
TABLET ORAL
Qty: 270 TABLET | Refills: 1 | Status: SHIPPED | OUTPATIENT
Start: 2024-07-19

## 2024-07-19 NOTE — TELEPHONE ENCOUNTER
Cyndie,     I'm sending refills, but please assure this is covered for Vernell Anne. She has been seizure free for a long time with her current meds, which have been covered before. She is at a high risk of more seizures with significant risk of injury, etc if she would have more seizures from a change in the medication. If insurance will not approve it, we can send them a letter to help inform them of a risk of change in her medications.     Let me know if it isn't approved and if we need to send a letter or appeal to insurance.     Amador Novoa MD

## 2024-07-22 NOTE — TELEPHONE ENCOUNTER
Call placed to plan, [phone]. Medication is covered. Paid claim for script on 7/19/2024.  No additional authorizations needed.

## 2024-08-07 DIAGNOSIS — G40.209 LOCALIZATION-RELATED FOCAL EPILEPSY WITH COMPLEX PARTIAL SEIZURES (HCC): ICD-10-CM

## 2024-08-09 RX ORDER — BRIVARACETAM 100 MG/1
1 TABLET, FILM COATED ORAL 2 TIMES DAILY
Qty: 180 TABLET | Refills: 1 | Status: SHIPPED | OUTPATIENT
Start: 2024-08-09

## 2024-08-13 ENCOUNTER — OFFICE VISIT (OUTPATIENT)
Dept: OBGYN CLINIC | Facility: CLINIC | Age: 64
End: 2024-08-13
Payer: COMMERCIAL

## 2024-08-13 ENCOUNTER — APPOINTMENT (OUTPATIENT)
Dept: RADIOLOGY | Facility: AMBULARY SURGERY CENTER | Age: 64
End: 2024-08-13
Attending: ORTHOPAEDIC SURGERY
Payer: COMMERCIAL

## 2024-08-13 VITALS
DIASTOLIC BLOOD PRESSURE: 83 MMHG | WEIGHT: 160 LBS | HEIGHT: 65 IN | HEART RATE: 66 BPM | SYSTOLIC BLOOD PRESSURE: 133 MMHG | BODY MASS INDEX: 26.66 KG/M2

## 2024-08-13 DIAGNOSIS — S82.831A OTHER CLOSED FRACTURE OF DISTAL END OF RIGHT FIBULA, INITIAL ENCOUNTER: ICD-10-CM

## 2024-08-13 DIAGNOSIS — Z01.89 ENCOUNTER FOR LOWER EXTREMITY COMPARISON IMAGING STUDY: ICD-10-CM

## 2024-08-13 DIAGNOSIS — S82.831A OTHER CLOSED FRACTURE OF DISTAL END OF RIGHT FIBULA, INITIAL ENCOUNTER: Primary | ICD-10-CM

## 2024-08-13 DIAGNOSIS — S82.832D OTHER CLOSED FRACTURE OF DISTAL END OF LEFT FIBULA WITH ROUTINE HEALING, SUBSEQUENT ENCOUNTER: ICD-10-CM

## 2024-08-13 PROCEDURE — 73600 X-RAY EXAM OF ANKLE: CPT

## 2024-08-13 PROCEDURE — 99213 OFFICE O/P EST LOW 20 MIN: CPT | Performed by: ORTHOPAEDIC SURGERY

## 2024-08-13 PROCEDURE — 73610 X-RAY EXAM OF ANKLE: CPT

## 2024-08-13 NOTE — PROGRESS NOTES
James R Lachman, M.D.  Attending, Orthopaedic Surgery  Foot and Ankle  [facility]      ORTHOPAEDIC FOOT AND ANKLE CLINIC VISIT     Assessment:     Encounter Diagnoses   Name Primary?    Other closed fracture of distal end of right fibula, initial encounter Yes    Other closed fracture of distal end of left fibula with routine healing, subsequent encounter     Encounter for lower extremity comparison imaging study         Plan:   The patient verbalized understanding of exam findings and treatment plan. We engaged in the shared decision-making process and treatment options were discussed at length with the patient. Surgical and conservative management discussed today along with risks and benefits.  Patient sustained non-displaced presley A right distal fibula fracture from inversion injury 4/30/2024  Managing non-operatively  XRs today are stable   WBAT in supportive shoe  Continue vit D and calcium   OTC pain medication, ice, elevation, compression stocking for pain and swelling control   Return in about 3 months (around 11/13/2024).  X-rays next visit- yes, weightbearing ankle       History of Present Illness:   Chief Complaint:   Chief Complaint   Patient presents with    Follow-up     Follow up of the right ankle. When she is walking it will hurt.      Vernell Prescott is a 63 y.o. female who is being seen in follow-up for right fibula fracture. When we last saw she we recommended weaning cam boot into supportive shoe.  Pain has  improved. Residual pain is localized at lateral ankle with minimal radiating and described as sharp and severe.      Pain/symptom timing:  Worse during the day when active  Pain/symptom context:  Worse with activites and work  Pain/symptom modifying factors:  Rest makes better, activities make worse  Pain/symptom associated signs/symptoms: none    Prior treatment   NSAIDsNo   Injections No   Bracing/Orthotics No    Physical Therapy No     Orthopedic Surgical History:  "  See below     Past Medical, Surgical and Social History:  Past Medical History:  has a past medical history of Hypertension, Seizure (HCC), and Thyroid disease.  Problem List: does not have any pertinent problems on file.  Past Surgical History:  has a past surgical history that includes Brain surgery; Ovarian cyst surgery; Hysterectomy; [place] tooth extraction; pr open tx distal fibular fracture lat malleolus (Left, 05/23/2022); and Craniotomy (12/28/1987).  Family History: family history includes Heart disease in her father; Seizures in her mother; Stroke in her mother; Thyroid disease in her mother.  Social History:  reports that she quit smoking about 21 years ago. Her smoking use included cigarettes. She started smoking about 31 years ago. She has a 5 pack-year smoking history. She has never used smokeless tobacco. She reports that she does not drink alcohol and does not use drugs.  Current Medications: has a current medication list which includes the following prescription(s): briviact, calcium carbonate-vitamin d, cholecalciferol, b-12, lacosamide, lamotrigine, pantoprazole, zonisamide, calcium carb-cholecalciferol, and meclizine.  Allergies: has No Known Allergies.     Review of Systems:  General- denies fever/chills  HEENT- denies hearing loss or sore throat  Eyes- denies eye pain or visual disturbances, denies red eyes  Respiratory- denies cough or SOB  Cardio- denies chest pain or palpitations  GI- denies abdominal pain  Endocrine- denies urinary frequency  Urinary- denies pain with urination  Musculoskeletal- Negative except noted above  Skin- denies rashes or wounds  Neurological- denies dizziness or headache  Psychiatric- denies anxiety or difficulty concentrating    Physical Exam:   /83   Pulse 66   Ht 5' 5\" (1.651 m)   Wt 72.6 kg (160 lb)   BMI 26.63 kg/m²   General/Constitutional: No apparent distress: well-nourished and well developed.  Eyes: normal ocular motion  Lymphatic: No " appreciable lymphadenopathy  Respiratory: Non-labored breathing  Vascular: No edema, swelling or tenderness, except as noted in detailed exam.  Integumentary: No impressive skin lesions present, except as noted in detailed exam.  Neuro: No ataxia or tremors noted  Psych: Normal mood and affect, oriented to person, place and time. Appropriate affect.  Musculoskeletal: Normal, except as noted in detailed exam and in HPI.    Examination    Right    Gait Normal   Musculoskeletal No ttp     Skin Normal.      Nails Normal    Range of Motion  20 degrees dorsiflexion, 30 degrees plantarflexion  Subtalar motion: normal    Stability Stable    Muscle Strength 5/5 tibialis anterior  5/5 gastrocnemius-soleus  5/5 posterior tibialis  5/5 peroneal/eversion strength  5/5 EHL  5/5 FHL    Neurologic Normal    Sensation  Intact to light touch throughout sural, saphenous, superficial peroneal, deep peroneal and medial/lateral plantar nerve distributions.  Ridgeway-Juliette 5.07 filament (10g) testing  deferred.    Cardiovascular Brisk capillary refill < 2 seconds,intact DP and PT pulses    Special Tests None      Imaging Studies:   3 views of the Right ankle were obtained, reviewed and interpreted independently which demonstrate adequate interval healing of lateral malleolus without evidence of displacement. Reviewed by me personally.    Scribe Attestation      I,:  Marleny Adler PA-C am acting as a scribe while in the presence of the attending physician.:       I,:  James R Lachman, MD personally performed the services described in this documentation    as scribed in my presence.:           James R. Lachman, MD  Foot & Ankle Surgery   Department of Orthopaedic Surgery  [facility]      I personally performed the service.    James R. Lachman, MD

## 2024-09-05 ENCOUNTER — APPOINTMENT (OUTPATIENT)
Dept: RADIOLOGY | Age: 64
End: 2024-09-05
Payer: COMMERCIAL

## 2024-09-05 ENCOUNTER — OFFICE VISIT (OUTPATIENT)
Dept: URGENT CARE | Age: 64
End: 2024-09-05
Payer: COMMERCIAL

## 2024-09-05 VITALS
BODY MASS INDEX: 29.52 KG/M2 | HEART RATE: 74 BPM | TEMPERATURE: 97.5 F | SYSTOLIC BLOOD PRESSURE: 126 MMHG | RESPIRATION RATE: 18 BRPM | HEIGHT: 65 IN | OXYGEN SATURATION: 99 % | DIASTOLIC BLOOD PRESSURE: 78 MMHG | WEIGHT: 177.2 LBS

## 2024-09-05 DIAGNOSIS — S52.572A OTHER CLOSED INTRA-ARTICULAR FRACTURE OF DISTAL END OF LEFT RADIUS, INITIAL ENCOUNTER: Primary | ICD-10-CM

## 2024-09-05 DIAGNOSIS — M79.602 PAIN OF LEFT UPPER EXTREMITY: ICD-10-CM
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
## 2024-09-05 DIAGNOSIS — Z23 ENCOUNTER FOR IMMUNIZATION: ICD-10-CM

## 2024-09-05 PROCEDURE — 73110 X-RAY EXAM OF WRIST: CPT

## 2024-09-05 PROCEDURE — 90471 IMMUNIZATION ADMIN: CPT

## 2024-09-05 PROCEDURE — 73060 X-RAY EXAM OF HUMERUS: CPT

## 2024-09-05 PROCEDURE — G0382 LEV 3 HOSP TYPE B ED VISIT: HCPCS | Performed by: EMERGENCY MEDICINE

## 2024-09-05 PROCEDURE — S9083 URGENT CARE CENTER GLOBAL: HCPCS | Performed by: EMERGENCY MEDICINE

## 2024-09-05 PROCEDURE — 90715 TDAP VACCINE 7 YRS/> IM: CPT

## 2024-09-05 NOTE — PROGRESS NOTES
[facility]        NAME: Vernell Prescott is a 63 y.o. female  : 1960    MRN: [id number]  DATE: 2024  TIME: 9:51 AM    Assessment and Plan   Pain of left upper extremity [M79.602]  1. Pain of left upper extremity  XR wrist 3+ vw left    XR humerus left      2. Encounter for immunization  Tdap Vaccine greater than or equal to 6yo      3. Fall, initial encounter          Was walking on the trail and tripped on a nut. Scraped left side face and injured left wrist and upper arm. No LOC, vision was blurry but has since resolved. Witnessed and accompanied. Per friend , no change to baseline mental status. X-rays note no obvious fractures. Facial abrasion cleansed, dressed. TDAP given. Discussed s/s that warrant ER    Pt called regarding positive radiology findings on x-ray- referral to ortho placed- pt will return for splint    Patient Instructions       Follow up with PCP in 3-5 days.  Proceed to  ER if symptoms worsen.    If tests have been performed at [facility], our office will contact you with results if changes need to be made to the care plan discussed with you at the visit.  You can review your full results on [facility].    Chief Complaint     Chief Complaint   Patient presents with    Fall     About 45 minutes. Vision went blurry after but is normal at this time. Right ankle pain as well   Fell on trail and tripped          History of Present Illness       Was walking on the trail and tripped on a nut. Scraped left side face and injured left wrist and upper arm. No LOC, vision was blurry but has since resolved. Witnessed and accompanied. Per friend , no change to baseline mental status. X-rays note no obvious fractures. Facial abrasion cleansed, dressed. TDAP given. Discussed s/s that warrant ER    Fall  Pertinent negatives include no headaches.       Review of Systems   Review of Systems   Eyes:  Negative for photophobia and visual disturbance.   Musculoskeletal:  Positive for  arthralgias. Negative for joint swelling.   Skin:  Positive for wound.   Neurological:  Negative for dizziness, syncope, light-headedness and headaches.   All other systems reviewed and are negative.        Current Medications       Current Outpatient Medications:     Briviact 100 MG TABS, TAKE 1 TABLET TWICE A DAY, Disp: 180 tablet, Rfl: 1    Calcium Carbonate-Vitamin D 600-200 MG-UNIT TABS, Take 1 tablet by mouth 2 (two) times a day, Disp: , Rfl:     Cyanocobalamin (B-12) 1000 MCG CAPS, in the morning, Disp: , Rfl:     lacosamide (VIMPAT) 100 mg tablet, TAKE 1 TABLET EVERY MORNING AND 2 TABLETS (200 MG) EVERY EVENING, Disp: 270 tablet, Rfl: 1    lamoTRIgine (LaMICtal) 200 MG tablet, TAKE 1 TABLET TWICE A DAY, Disp: 180 tablet, Rfl: 3    pantoprazole (PROTONIX) 40 mg tablet, Take 40 mg by mouth daily, Disp: , Rfl:     zonisamide (ZONEGRAN) 100 mg capsule, Taking 100mg in am and 200mg at night, Disp: 270 capsule, Rfl: 3    Calcium Carb-Cholecalciferol 600-10 MG-MCG TABS, Take 1 tablet by mouth 2 (two) times a day (Patient not taking: Reported on 1/7/2024), Disp: , Rfl:     Cholecalciferol 50 MCG (2000 UT) CAPS, Take 1 capsule by mouth in the morning. (Patient not taking: Reported on 9/5/2024), Disp: , Rfl:     meclizine (ANTIVERT) 12.5 MG tablet, Take 1 tablet (12.5 mg total) by mouth 3 (three) times a day as needed for dizziness, Disp: 30 tablet, Rfl: 0    Current Allergies     Allergies as of 09/05/2024    (No Known Allergies)            The following portions of the patient's history were reviewed and updated as appropriate: allergies, current medications, past family history, past medical history, past social history, past surgical history and problem list.     Past Medical History:   Diagnosis Date    Hypertension     Seizure (HCC)     Thyroid disease        Past Surgical History:   Procedure Laterality Date    BRAIN SURGERY      CRANIOTOMY  12/28/1987    HYSTERECTOMY      OVARIAN CYST SURGERY      NE OPEN TX  "DISTAL FIBULAR FRACTURE LAT MALLEOLUS Left 05/23/2022    Procedure: OPEN REDUCTION W/ INTERNAL FIXATION (ORIF) ANKLE;  Surgeon: James R Lachman, MD;  Location:  MAIN OR;  Service: Orthopedics    WISDOM TOOTH EXTRACTION         Family History   Problem Relation Age of Onset    Seizures Mother     Stroke Mother     Thyroid disease Mother     Heart disease Father          Medications have been verified.        Objective   /78   Pulse 74   Temp 97.5 °F (36.4 °C)   Resp 18   Ht 5' 5\" (1.651 m)   Wt 80.4 kg (177 lb 3.2 oz)   SpO2 99%   BMI 29.49 kg/m²   No LMP recorded.       Physical Exam     Physical Exam  Vitals reviewed.   Constitutional:       Appearance: Normal appearance.   Musculoskeletal:         General: Tenderness and signs of injury present. Normal range of motion.   Skin:     Capillary Refill: Capillary refill takes less than 2 seconds.      Findings: Abrasion and wound present. No bruising.          Neurological:      General: No focal deficit present.      Mental Status: She is alert and oriented to person, place, and time. Mental status is at baseline.   Psychiatric:         Behavior: Behavior normal.         Thought Content: Thought content normal.         Judgment: Judgment normal.                   "

## 2024-09-06 ENCOUNTER — OFFICE VISIT (OUTPATIENT)
Dept: OBGYN CLINIC | Facility: CLINIC | Age: 64
End: 2024-09-06
Payer: COMMERCIAL

## 2024-09-06 VITALS
WEIGHT: 177 LBS | DIASTOLIC BLOOD PRESSURE: 79 MMHG | BODY MASS INDEX: 29.49 KG/M2 | HEIGHT: 65 IN | SYSTOLIC BLOOD PRESSURE: 129 MMHG | HEART RATE: 73 BPM

## 2024-09-06 DIAGNOSIS — S52.572A OTHER CLOSED INTRA-ARTICULAR FRACTURE OF DISTAL END OF LEFT RADIUS, INITIAL ENCOUNTER: Primary | ICD-10-CM

## 2024-09-06 PROBLEM — S52.502A CLOSED FRACTURE OF LEFT DISTAL RADIUS: Status: ACTIVE | Noted: 2024-09-06

## 2024-09-06 PROCEDURE — 99214 OFFICE O/P EST MOD 30 MIN: CPT | Performed by: ORTHOPAEDIC SURGERY

## 2024-09-06 PROCEDURE — 25600 CLTX DST RDL FX/EPHYS SEP WO: CPT | Performed by: ORTHOPAEDIC SURGERY

## 2024-09-06 NOTE — PROGRESS NOTES
Assessment:  1. 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  Diagnosis, treatment options and associated risks were discussed with the patient including no treatment, nonsurgical treatment and potential for surgical intervention.  The patient was given the opportunity to ask questions regarding each.   Discussed with Vernell Anne that her fracture is in maintained anatomic alignment and will respond well to non-operative treatment with closed immobilization. She was placed in a short arm splint today to allow for inflammatory response over the next one week.   May take NSAIDs/Tylenol as needed for pain control  We will plan to see her back in one week for re-evaluation, x-ray out of splint, short arm cast application          Subjective:     Patient ID:  Vernell Prescott 63 y.o. female    HPI  Vernell Prescott is a 63 y.o. female who presents today for initial evaluation of left wrist injury sustained one day ago after a fall onto her outstretched arm. She presented to the [facility] where x-rays were obtained and initially she was told there was no fracture, however radiology read demonstrated closed, nondisplaced distal radius fracture. She presents today  in a cock up wrist splint. She describes constant 8/10 aching pain aggravated by use of her hand. She denies previous injury, surgery, or trauma to the left wrist. She is taking Tylenol for pain with some relief. She denies paraesthesias into the hand.         The following portions of the patient's history were reviewed and updated as appropriate: allergies, current medications, past family history, past social history, past surgical history and problem list.        Social History     Socioeconomic History    Marital status: Single     Spouse name: Not on file    Number of children: Not on file    Years of education: Not on file    Highest education level: Not on file   Occupational History    Not on file   Tobacco Use    Smoking status: Former     Current packs/day: 0.00     Average packs/day: 0.5 packs/day for 10.0 years (5.0 ttl pk-yrs)     Types: Cigarettes     Start date: 1993     Quit date: 2003     Years since quittin.3    Smokeless tobacco: Never   Vaping Use    Vaping status: Never Used   Substance and Sexual Activity    Alcohol use: No    Drug use: Never    Sexual activity: Never   Other Topics Concern    Not on file   Social History Narrative    Lives in [place].      Social Determinants of Health     Financial Resource Strain: Low Risk  (2024)    [facility]    Overall Financial Resource Strain (CARDIA)     Difficulty of Paying Living Expenses: Not very hard   Food Insecurity: No Food Insecurity (2024)    [facility]    Hunger Vital Sign     Worried About Running Out of Food in the Last Year: Never true     Ran Out of Food in the Last Year: Never true   Transportation Needs: No Transportation Needs (2024)    [facility]    PRAPARE - Transportation     Lack of Transportation (Medical): No     Lack  of Transportation (Non-Medical): No   Physical Activity: Not on file   Stress: Not on file   Social Connections: Not on file   Intimate Partner Violence: Not At Risk (1/8/2024)    [facility]    Humiliation, Afraid, Rape, and Kick questionnaire     Fear of Current or Ex-Partner: No     Emotionally Abused: No     Physically Abused: No     Sexually Abused: No   Housing Stability: Low Risk  (1/8/2024)    [facility]    Housing Stability Vital Sign     Unable to Pay for Housing in the Last Year: No     Number of Places Lived in the Last Year: 1     Unstable Housing in the Last Year: No     Past Medical History:   Diagnosis Date    Hypertension     Seizure (HCC)     Thyroid disease      Past Surgical History:   Procedure Laterality Date    BRAIN SURGERY      CRANIOTOMY  12/28/1987    HYSTERECTOMY      OVARIAN CYST SURGERY      [place] OPEN TX DISTAL FIBULAR FRACTURE LAT MALLEOLUS Left 05/23/2022    Procedure: OPEN REDUCTION W/ INTERNAL FIXATION (ORIF) ANKLE;  Surgeon: James R Lachman, MD;  Location: [facility];  Service: Orthopedics    WISDOM TOOTH EXTRACTION       No Known Allergies  Current Outpatient Medications on File Prior to Visit   Medication Sig Dispense Refill    Briviact 100 MG TABS TAKE 1 TABLET TWICE A  tablet 1    Calcium Carb-Cholecalciferol 600-10 MG-MCG TABS Take 1 tablet by mouth 2 (two) times a day (Patient not taking: Reported on 1/7/2024)      Calcium Carbonate-Vitamin D 600-200 MG-UNIT TABS Take 1 tablet by mouth 2 (two) times a day      Cholecalciferol 50 MCG (2000 UT) CAPS Take 1 capsule by mouth in the morning. (Patient not taking: Reported on 9/5/2024)      Cyanocobalamin (B-12) 1000 MCG CAPS in the morning      lacosamide (VIMPAT) 100 mg tablet TAKE 1 TABLET EVERY MORNING AND 2 TABLETS (200 MG) EVERY EVENING 270 tablet 1    lamoTRIgine (LaMICtal) 200 MG tablet TAKE 1  "TABLET TWICE A  tablet 3    meclizine (ANTIVERT) 12.5 MG tablet Take 1 tablet (12.5 mg total) by mouth 3 (three) times a day as needed for dizziness 30 tablet 0    pantoprazole (PROTONIX) 40 mg tablet Take 40 mg by mouth daily      zonisamide (ZONEGRAN) 100 mg capsule Taking 100mg in am and 200mg at night 270 capsule 3     No current facility-administered medications on file prior to visit.              Objective:    Review of Systems  Pertinent items are noted in HPI.  All other systems were reviewed and are negative.    Physical Exam  /79   Pulse 73   Ht 5' 5\" (1.651 m)   Wt 80.3 kg (177 lb)   BMI 29.45 kg/m²   Cons: Appears well.  No apparent distress.  Psych: Alert. Oriented x3.  Mood and affect normal.  Eyes: PERRLA, EOMI  Resp: Normal effort.  No audible wheezing or stridor.  CV: Palpable pulse.  No discernable arrhythmia.  No LE edema.  Lymph:  No palpable cervical, axillary, or inguinal lymphadenopathy.  Skin: Warm.  No palpable masses.  No visible lesions.  Neuro: Normal muscle tone.  Normal and symmetric DTR's.    Ortho Exam  Minor to moderate swelling, ecchymosis  No obvious deformity  Able to achieve composite fist  Warm to palpation  TTP in area of fracture  Brisk capillary refill  Sensation intact to Ax/R/M/U nerve distributions      Procedures  Fracture / Dislocation Treatment    Date/Time: 9/6/2024 7:30 AM    Performed by: Wolf Sebastian DO  Authorized by: Wolf Sebastian DO    Injury location:  Wrist  Location details:  Left wrist  Injury type:  Fracture  Fracture type: distal radius    Fracture type: distal radius    Manipulation performed?: No    Immobilization:  Splint  Splint type:  Short arm splint, static (forearm to hand)  Supplies used:  Cotton padding and plaster  Neurovascular status: Neurovascularly intact           Diagnostics, reviewed and taken today if performed as documented:  I have personally reviewed pertinent films in PACS and my interpretation is closed, nondisplaced " "distal radius fracture.        Scribe Attestation      I,:  Malka Rubio am acting as a scribe while in the presence of the attending physician.:       I,:  Wolf Sebastian, DO personally performed the services described in this documentation    as scribed in my presence.:             Portions of the record may have been created with voice recognition software.  Occasional wrong word or \"sound a like\" substitutions may have occurred due to the inherent limitations of voice recognition software.  Read the chart carefully and recognize, using context, where substitutions have occurred.  "

## 2024-09-10 ENCOUNTER — HOSPITAL ENCOUNTER (OUTPATIENT)
Dept: RADIOLOGY | Facility: IMAGING CENTER | Age: 64
Discharge: HOME | End: 2024-09-10
Payer: COMMERCIAL

## 2024-09-10 DIAGNOSIS — S52.572A OTHER CLOSED INTRA-ARTICULAR FRACTURE OF DISTAL END OF LEFT RADIUS, INITIAL ENCOUNTER: ICD-10-CM

## 2024-09-10 PROCEDURE — 77080 DXA BONE DENSITY AXIAL: CPT

## 2024-09-13 ENCOUNTER — APPOINTMENT (OUTPATIENT)
Dept: RADIOLOGY | Facility: AMBULARY SURGERY CENTER | Age: 64
End: 2024-09-13
Attending: ORTHOPAEDIC SURGERY
Payer: COMMERCIAL

## 2024-09-13 ENCOUNTER — OFFICE VISIT (OUTPATIENT)
Dept: OBGYN CLINIC | Facility: CLINIC | Age: 64
End: 2024-09-13
Payer: COMMERCIAL

## 2024-09-13 VITALS — HEIGHT: 65 IN | WEIGHT: 177 LBS | BODY MASS INDEX: 29.49 KG/M2

## 2024-09-13 DIAGNOSIS — S52.572A OTHER CLOSED INTRA-ARTICULAR FRACTURE OF DISTAL END OF LEFT RADIUS, INITIAL ENCOUNTER: ICD-10-CM

## 2024-09-13 DIAGNOSIS — S52.572A OTHER CLOSED INTRA-ARTICULAR FRACTURE OF DISTAL END OF LEFT RADIUS, INITIAL ENCOUNTER: Primary | ICD-10-CM

## 2024-09-13 PROCEDURE — 73110 X-RAY EXAM OF WRIST: CPT

## 2024-09-13 PROCEDURE — 29075 APPL CST ELBW FNGR SHORT ARM: CPT | Performed by: ORTHOPAEDIC SURGERY

## 2024-09-13 PROCEDURE — 99024 POSTOP FOLLOW-UP VISIT: CPT | Performed by: ORTHOPAEDIC SURGERY

## 2024-09-13 NOTE — PROGRESS NOTES
Assessment:  1. 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)    History of craniotomy    Essential hypertension    GERD (gastroesophageal reflux disease)    Cognitive changes    Adjustment disorder with depressed mood    Trimalleolar fracture of ankle, closed, left, initial encounter    Left knee pain    Closed fracture of left distal fibula    Other closed fracture of distal end of right fibula, initial encounter    Closed fracture of left distal radius           Plan        63 year old female with left distal radius fracture sustained 9/5/2024   Splint was removed today   X-rays were performed in the office and reviewed, fracture is stable   She was placed into a short arm cast   Cast care and cast restrictions were reviewed   Follow up in 3 weeks time for cast removal and left wrist x-rays, at which time she may be transitioned into a splint and a referral to OT will be provided             Subjective:     Patient ID:    Chief Complaint:Vernell Prescott 63 y.o. female      HPI    Patient comes in today with regards to a left wrist fracture. She has been immobilized in a splint. She tolerated immobilization well. She is taking extra strength Tylenol for pain control.       The following portions of the patient's history were reviewed and updated as appropriate: allergies, current medications, past family history, past social history, past surgical history and problem list.        Social History     Socioeconomic History    Marital status: Single     Spouse name: Not on file    Number of children: Not on file    Years of education: Not on file    Highest education level: Not on file   Occupational History    Not on file   Tobacco Use    Smoking status: Former     Current packs/day: 0.00     Average packs/day: 0.5 packs/day for 10.0  years (5.0 ttl pk-yrs)     Types: Cigarettes     Start date: 1993     Quit date: 2003     Years since quittin.3    Smokeless tobacco: Never   Vaping Use    Vaping status: Never Used   Substance and Sexual Activity    Alcohol use: No    Drug use: Never    Sexual activity: Never   Other Topics Concern    Not on file   Social History Narrative    Lives in [place].      Social Determinants of Health     Financial Resource Strain: Low Risk  (2024)    [facility]    Overall Financial Resource Strain (CARDIA)     Difficulty of Paying Living Expenses: Not very hard   Food Insecurity: No Food Insecurity (2024)    [facility]    Hunger Vital Sign     Worried About Running Out of Food in the Last Year: Never true     Ran Out of Food in the Last Year: Never true   Transportation Needs: No Transportation Needs (2024)    [facility]    PRAPARE - Transportation     Lack of Transportation (Medical): No     Lack of Transportation (Non-Medical): No   Physical Activity: Not on file   Stress: Not on file   Social Connections: Not on file   Intimate Partner Violence: Not At Risk (2024)    [facility]    Humiliation, Afraid, Rape, and Kick questionnaire     Fear of Current or Ex-Partner: No     Emotionally Abused: No     Physically Abused: No     Sexually Abused: No   Housing Stability: Low Risk  (2024)    [facility]    Housing Stability Vital Sign     Unable to Pay for Housing in the Last Year: No     Number of Places Lived in the Last Year: 1     Unstable Housing in the Last Year: No     Past Medical History:   Diagnosis Date    Hypertension     Seizure (HCC)     Thyroid disease      Past Surgical  History:   Procedure Laterality Date    BRAIN SURGERY      CRANIOTOMY  12/28/1987    HYSTERECTOMY      OVARIAN CYST SURGERY      SD OPEN TX DISTAL FIBULAR FRACTURE LAT MALLEOLUS Left 05/23/2022    Procedure: OPEN REDUCTION W/ INTERNAL FIXATION (ORIF) ANKLE;  Surgeon: James R Lachman, MD;  Location:  MAIN OR;  Service: Orthopedics    WISDOM TOOTH EXTRACTION       No Known Allergies  Current Outpatient Medications on File Prior to Visit   Medication Sig Dispense Refill    Briviact 100 MG TABS TAKE 1 TABLET TWICE A  tablet 1    Calcium Carb-Cholecalciferol 600-10 MG-MCG TABS Take 1 tablet by mouth 2 (two) times a day (Patient not taking: Reported on 1/7/2024)      Calcium Carbonate-Vitamin D 600-200 MG-UNIT TABS Take 1 tablet by mouth 2 (two) times a day      Cholecalciferol 50 MCG (2000 UT) CAPS Take 1 capsule by mouth in the morning. (Patient not taking: Reported on 9/5/2024)      Cyanocobalamin (B-12) 1000 MCG CAPS in the morning      lacosamide (VIMPAT) 100 mg tablet TAKE 1 TABLET EVERY MORNING AND 2 TABLETS (200 MG) EVERY EVENING 270 tablet 1    lamoTRIgine (LaMICtal) 200 MG tablet TAKE 1 TABLET TWICE A  tablet 3    meclizine (ANTIVERT) 12.5 MG tablet Take 1 tablet (12.5 mg total) by mouth 3 (three) times a day as needed for dizziness 30 tablet 0    pantoprazole (PROTONIX) 40 mg tablet Take 40 mg by mouth daily      zonisamide (ZONEGRAN) 100 mg capsule Taking 100mg in am and 200mg at night 270 capsule 3     No current facility-administered medications on file prior to visit.              Objective:    Review of Systems   Constitutional:  Negative for chills, fever and unexpected weight change.   HENT:  Negative for hearing loss, nosebleeds and sore throat.    Eyes:  Negative for pain, redness and visual disturbance.   Respiratory:  Negative for cough, shortness of breath and wheezing.    Cardiovascular:  Negative for chest pain, palpitations and leg swelling.   Gastrointestinal:  Negative for  abdominal pain, nausea and vomiting.   Endocrine: Negative for polydipsia and polyuria.   Genitourinary:  Negative for difficulty urinating and hematuria.   Musculoskeletal:  Negative for arthralgias, joint swelling and myalgias.   Skin:  Negative for rash and wound.   Neurological:  Negative for dizziness, numbness and headaches.   Psychiatric/Behavioral:  Negative for decreased concentration, dysphoric mood and suicidal ideas. The patient is not nervous/anxious.        Left Hand Exam     Other   Erythema: absent  Scars: absent  Sensation: normal  Pulse: present    Comments:  Full digital extension   Full composite fist            Physical Exam  Vitals and nursing note reviewed.   Constitutional:       Appearance: Normal appearance. She is well-developed.   HENT:      Head: Normocephalic.      Right Ear: External ear normal.      Left Ear: External ear normal.      Nose: Nose normal.   Eyes:      Extraocular Movements: Extraocular movements intact.      Conjunctiva/sclera: Conjunctivae normal.   Cardiovascular:      Rate and Rhythm: Normal rate.      Pulses: Normal pulses.   Pulmonary:      Effort: Pulmonary effort is normal. No respiratory distress.   Abdominal:      Palpations: Abdomen is soft.   Musculoskeletal:      Cervical back: Normal range of motion.      Comments: See ortho exam    Skin:     General: Skin is warm and dry.      Capillary Refill: Capillary refill takes less than 2 seconds.   Neurological:      General: No focal deficit present.      Mental Status: She is alert and oriented to person, place, and time.   Psychiatric:         Mood and Affect: Mood normal.         Behavior: Behavior normal.         Thought Content: Thought content normal.         Judgment: Judgment normal.         Cast application    Date/Time: 9/13/2024 8:00 AM    Performed by: Wolf Sebastian DO  Authorized by: Wolf Sebastian DO  Universal Protocol:  procedure performed by consultantConsent: Verbal consent obtained. Written consent  "not obtained.  Risks and benefits: risks, benefits and alternatives were discussed  Consent given by: patient  Patient understanding: patient states understanding of the procedure being performed  Site marked: the operative site was marked  Patient identity confirmed: verbally with patient    Pre-procedure details:     Sensation:  Normal  Procedure details:     Laterality:  Left    Location:  Wrist    Wrist:  L wrist    Strapping: no  Cast type:  Short arm        Supplies:  Cotton padding, 2 layer wrap and fiberglass  Post-procedure details:     Sensation:  Normal    Patient tolerance of procedure:  Tolerated well, no immediate complications         I have personally reviewed pertinent films in PACS and my interpretation is stable alignment of nondisplaced distal radius fracture.      Portions of the record may have been created with voice recognition software.  Occasional wrong word or \"sound a like\" substitutions may have occurred due to the inherent limitations of voice recognition software.  Read the chart carefully and recognize, using context, where substitutions have occurred.    Scribe Attestation      I,:  Agueda Dsouza MA am acting as a scribe while in the presence of the attending physician.:       I,:  Wolf Sebastian DO personally performed the services described in this documentation    as scribed in my presence.:             "

## 2024-09-19 ENCOUNTER — TELEPHONE (OUTPATIENT)
Dept: URGENT CARE | Age: 64
End: 2024-09-19

## 2024-09-21 DIAGNOSIS — Q04.3 POLYMICROGYRIA (HCC): ICD-10-CM

## 2024-09-21 DIAGNOSIS — G40.209 LOCALIZATION-RELATED FOCAL EPILEPSY WITH COMPLEX PARTIAL SEIZURES (HCC): ICD-10-CM

## 2024-09-25 RX ORDER — ZONISAMIDE 100 MG/1
CAPSULE ORAL
Qty: 270 CAPSULE | Refills: 3 | Status: SHIPPED | OUTPATIENT
Start: 2024-09-25

## 2024-09-25 NOTE — TELEPHONE ENCOUNTER
Patient Comment: I have just under 3 weeks available (20 days). 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 Broke my wrist and have been dealing with that. 2nd extremity this year. If need be, I will  30 days at pharmacy. Thanks for your help in expediting this.       Refill entered, please sign off

## 2024-10-04 ENCOUNTER — APPOINTMENT (OUTPATIENT)
Dept: RADIOLOGY | Facility: AMBULARY SURGERY CENTER | Age: 64
End: 2024-10-04
Attending: ORTHOPAEDIC SURGERY
Payer: COMMERCIAL

## 2024-10-04 ENCOUNTER — OFFICE VISIT (OUTPATIENT)
Dept: OBGYN CLINIC | Facility: CLINIC | Age: 64
End: 2024-10-04

## 2024-10-04 VITALS — HEIGHT: 65 IN | BODY MASS INDEX: 29.49 KG/M2 | WEIGHT: 177 LBS

## 2024-10-04 DIAGNOSIS — S52.572D OTHER CLOSED INTRA-ARTICULAR FRACTURE OF DISTAL END OF LEFT RADIUS WITH ROUTINE HEALING, SUBSEQUENT ENCOUNTER: Primary | ICD-10-CM

## 2024-10-04 DIAGNOSIS — S52.572D OTHER CLOSED INTRA-ARTICULAR FRACTURE OF DISTAL END OF LEFT RADIUS WITH ROUTINE HEALING, SUBSEQUENT ENCOUNTER: ICD-10-CM

## 2024-10-04 PROCEDURE — 99024 POSTOP FOLLOW-UP VISIT: CPT | Performed by: ORTHOPAEDIC SURGERY

## 2024-10-04 PROCEDURE — 73110 X-RAY EXAM OF WRIST: CPT

## 2024-10-04 NOTE — PROGRESS NOTES
Assessment:  1. 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)    History of craniotomy    Essential hypertension    GERD (gastroesophageal reflux disease)    Cognitive changes    Adjustment disorder with depressed mood    Trimalleolar fracture of ankle, closed, left, initial encounter    Left knee pain    Closed fracture of left distal fibula    Other closed fracture of distal end of right fibula, initial encounter    Closed fracture of left distal radius    Other closed intra-articular fracture of distal end of left radius with routine healing, subsequent encounter           Plan  63 year old female with left distal radius fracture sustained 9/5/2024   Cast removed today without complication, patient transitioned to cock up wrist splint to be worn for two weeks for activity and out of the home  X-rays were performed in the office and reviewed, fracture has maintained alignment and interval healing is noted   Referral placed today for Vernell Anne to initiate outpatient Occupational Therapy for range of motion, strengthening  Follow up in 6 weeks time for re-evaluation with x-ray of left wrist        Subjective:    Patient ID:  Vernell Prescott 63 y.o. female    HPI  Patient comes in today for follow up evaluation of left distal radius fracture sustained 4 weeks ago on 9/5/2024. She has been immobilized in a short arm cast applied at her last visit. She tolerated immobilization well and reports her cast remained clean and dry. She is taking Aleve for pain control.  She notes a dorsal popping sensation that has been present since the time of her injury.  Today she describes 6/10 aching pain aggravated with movement of the hand and wrist. No distal  paraesthesias.       The following portions of the patient's history were reviewed and updated as appropriate: allergies, current medications, past family history, past social history, past surgical history and problem list.        Social History     Socioeconomic History    Marital status: Single     Spouse name: Not on file    Number of children: Not on file    Years of education: Not on file    Highest education level: Not on file   Occupational History    Not on file   Tobacco Use    Smoking status: Former     Current packs/day: 0.00     Average packs/day: 0.5 packs/day for 10.0 years (5.0 ttl pk-yrs)     Types: Cigarettes     Start date: 1993     Quit date: 2003     Years since quittin.4    Smokeless tobacco: Never   Vaping Use    Vaping status: Never Used   Substance and Sexual Activity    Alcohol use: No    Drug use: Never    Sexual activity: Never   Other Topics Concern    Not on file   Social History Narrative    Lives in [place].      Social Determinants of Health     Financial Resource Strain: Low Risk  (2024)    Received from [facility]    Overall Financial Resource Strain (CARDIA)     Difficulty of Paying Living Expenses: Not very hard   Food Insecurity: No Food Insecurity (2024)    [facility]    Hunger Vital Sign     Worried About Running Out of Food in the Last Year: Never true     Ran Out of Food in the Last Year: Never true   Transportation Needs: No Transportation Needs (2024)    Received from [facility]    PRAPARE - Transportation     Lack of Transportation (Medical): No     Lack of Transportation (Non-Medical): No   Physical Activity: Not on file   Stress: Not on file   Social Connections: Not on file   Intimate Partner Violence: Not At Risk (2024)    [facility]    Humiliation, Afraid, Rape, and Kick questionnaire     Fear of Current or Ex-Partner: No     Emotionally Abused: No     Physically Abused: No     Sexually Abused: No   Housing Stability: Low Risk  (1/8/2024)    [facility]    Housing Stability Vital Sign     Unable to Pay for Housing in the Last Year: No     Number of Places Lived in the Last Year: 1     Unstable Housing in the Last Year: No     Past Medical History:   Diagnosis Date    Hypertension     Seizure (HCC)     Thyroid disease      Past Surgical History:   Procedure Laterality Date    BRAIN SURGERY      CRANIOTOMY  12/28/1987    HYSTERECTOMY      OVARIAN CYST SURGERY      AK OPEN TX DISTAL FIBULAR FRACTURE LAT MALLEOLUS Left 05/23/2022    Procedure: OPEN REDUCTION W/ INTERNAL FIXATION (ORIF) ANKLE;  Surgeon: James R Lachman, MD;  Location:  MAIN OR;  Service: Orthopedics    WISDOM TOOTH EXTRACTION       No Known Allergies  Current Outpatient Medications on File Prior to Visit   Medication Sig Dispense Refill    Briviact 100 MG TABS TAKE 1 TABLET TWICE A  tablet 1    Calcium Carb-Cholecalciferol 600-10 MG-MCG TABS Take 1 tablet by mouth 2 (two) times a day (Patient not taking: Reported on 1/7/2024)      Calcium Carbonate-Vitamin D 600-200 MG-UNIT TABS Take 1 tablet by mouth 2 (two) times a day      Cholecalciferol 50 MCG (2000 UT) CAPS Take 1 capsule by mouth in the morning. (Patient not taking: Reported on 9/5/2024)      Cyanocobalamin (B-12) 1000 MCG CAPS in the morning      lacosamide (VIMPAT) 100 mg tablet TAKE 1 TABLET EVERY MORNING AND 2 TABLETS (200 MG) EVERY EVENING 270 tablet 1    lamoTRIgine (LaMICtal) 200 MG tablet TAKE 1 TABLET TWICE A  tablet 3    meclizine (ANTIVERT) 12.5 MG tablet Take 1 tablet (12.5 mg total) by mouth 3 (three) times a day as needed for dizziness 30 tablet 0    pantoprazole (PROTONIX) 40 mg tablet Take 40 mg by mouth daily       zonisamide (ZONEGRAN) 100 mg capsule Taking 100mg in am and 200mg at night 270 capsule 3     No current facility-administered medications on file prior to visit.              Objective:    Review of Systems   Constitutional:  Negative for chills, fever and unexpected weight change.   HENT:  Negative for hearing loss, nosebleeds and sore throat.    Eyes:  Negative for pain, redness and visual disturbance.   Respiratory:  Negative for cough, shortness of breath and wheezing.    Cardiovascular:  Negative for chest pain, palpitations and leg swelling.   Gastrointestinal:  Negative for abdominal pain, nausea and vomiting.   Endocrine: Negative for polydipsia and polyuria.   Genitourinary:  Negative for difficulty urinating and hematuria.   Musculoskeletal:  Negative for arthralgias, joint swelling and myalgias.   Skin:  Negative for rash and wound.   Neurological:  Negative for dizziness, numbness and headaches.   Psychiatric/Behavioral:  Negative for decreased concentration, dysphoric mood and suicidal ideas. The patient is not nervous/anxious.        Left Hand Exam     Other   Erythema: absent  Scars: absent  Sensation: normal  Pulse: present    Comments:  Full digital extension   Full composite fist            Physical Exam  Vitals and nursing note reviewed.   Constitutional:       Appearance: Normal appearance. She is well-developed.   HENT:      Head: Normocephalic.      Right Ear: External ear normal.      Left Ear: External ear normal.      Nose: Nose normal.   Eyes:      Extraocular Movements: Extraocular movements intact.      Conjunctiva/sclera: Conjunctivae normal.   Cardiovascular:      Rate and Rhythm: Normal rate.      Pulses: Normal pulses.   Pulmonary:      Effort: Pulmonary effort is normal. No respiratory distress.   Abdominal:      Palpations: Abdomen is soft.   Musculoskeletal:      Cervical back: Normal range of motion.      Comments: See ortho exam    Skin:     General: Skin is warm and dry.       "Capillary Refill: Capillary refill takes less than 2 seconds.   Neurological:      General: No focal deficit present.      Mental Status: She is alert and oriented to person, place, and time.   Psychiatric:         Mood and Affect: Mood normal.         Behavior: Behavior normal.         Thought Content: Thought content normal.         Judgment: Judgment normal.         Procedures   None performed today    I have personally reviewed pertinent films in PACS and my interpretation is stable alignment of nondisplaced distal radius fracture with interval healing present.            Portions of the record may have been created with voice recognition software.  Occasional wrong word or \"sound a like\" substitutions may have occurred due to the inherent limitations of voice recognition software.  Read the chart carefully and recognize, using context, where substitutions have occurred.    Scribe Attestation      I,:  Malka Rubio am acting as a scribe while in the presence of the attending physician.:       I,:  Wolf Sebastian, DO personally performed the services described in this documentation    as scribed in my presence.:             "

## 2024-10-10 ENCOUNTER — EVALUATION (OUTPATIENT)
Dept: PHYSICAL THERAPY | Facility: MEDICAL CENTER | Age: 64
End: 2024-10-10
Payer: COMMERCIAL

## 2024-10-10 DIAGNOSIS — S52.572D OTHER CLOSED INTRA-ARTICULAR FRACTURE OF DISTAL END OF LEFT RADIUS WITH ROUTINE HEALING, SUBSEQUENT ENCOUNTER: Primary | ICD-10-CM

## 2024-10-10 PROCEDURE — 97140 MANUAL THERAPY 1/> REGIONS: CPT | Performed by: PHYSICAL THERAPIST

## 2024-10-10 PROCEDURE — 97110 THERAPEUTIC EXERCISES: CPT | Performed by: PHYSICAL THERAPIST

## 2024-10-10 PROCEDURE — 97161 PT EVAL LOW COMPLEX 20 MIN: CPT | Performed by: PHYSICAL THERAPIST

## 2024-10-10 NOTE — PROGRESS NOTES
PT Evaluation     Today's date: 10/10/2024  Patient name: Vernell Prescott  : 1960  MRN: [id number]  Referring provider: Wolf Sebastian DO  Dx:   Encounter Diagnosis     ICD-10-CM    1. Other closed intra-articular fracture of distal end of left radius with routine healing, subsequent encounter  S52.572D Ambulatory referral to PT/OT hand therapy                     Assessment  Impairments: abnormal muscle firing, abnormal or restricted ROM, activity intolerance, impaired physical strength, lacks appropriate home exercise program, pain with function, participation limitations and activity limitations  Symptom irritability: moderate    Assessment details: Ms. Prescott is a pleasant 63 y.o. RHD female who presents today 5 weeks s/p L DRF DOI 24 secondary to a fall. No further referral appears necessary at this time based upon examination findings. Patient presents with left wrist hypomobility secondary to fracture and immobilization. She also presents with extrinsic muscle tightness, muscle inhibition, and decreased strength limiting her ability to lift, clean, and participate in aqua aerobics. Patient would benefit from outpatient physical therapy services to address the observed impairments to restore functional mobility and strength of the left hand to facilitate return to PLOF. Prognosis is good given compliance with PT attendance and HEP performance. Please contact me with any questions. Thank you for the referral.   Understanding of Dx/Px/POC: good     Prognosis: good    Goals  1. Patient will be independent in individualized HEP to maximize restoration of wrist mobility  2. Patient will achieve full active composite fist to assist with functional grasp.  3. Patient will improve left wrist AROM by 15-20 degrees to improve tolerance to ADL/IADL performance  4. Patient will be able to DC brace in 2 weeks  5. Patient will be able to progress to strengthening.   6. Patient will achieve functional  strength  compared to the contralateral side to assist with lifting/carrying.  7. Patient will achieve score on FOTO by MDC.    Plan  Patient would benefit from: skilled physical therapy  Referral necessary: No  Planned modality interventions: thermotherapy: hydrocollator packs    Planned therapy interventions: joint mobilization, manual therapy, neuromuscular re-education, massage, patient/caregiver education, strengthening, stretching, therapeutic exercise, functional ROM exercises, graded exercise, home exercise program and flexibility    Frequency: 2x week  Duration in weeks: 8  Plan of Care beginning date: 10/10/2024  Plan of Care expiration date: 2024  Treatment plan discussed with: patient        Subjective Evaluation    History of Present Illness  Date of onset: 2024  Mechanism of injury: trauma  Mechanism of injury: Ms. Prescott is a 63 y.o. female who presents today 5 weeks s/p L DRF DOI 24 being managed conservatively. Patient sustained injury secondary to a mechanical fall onto an outstretched hand. Patient was in a splint for 1 week and then transitioned to a cast for 2 weeks. She is now In a removable wrist cock up. She is wearing her brace for all activities throughout the day and removes for sleeping and hygiene. She has started some exercises on her own to start moving the wrist. Patient has returned to driving. She is independent with ADLs. She does have more difficulty with cleaning and has not been able to. She is limiting with lifting. Patient enjoys aqua aerobics and is looking to get back to this.     X-rays from 10/4:    IMPRESSION  Healing distal radius fracture in stable alignment.    Patient Goals  Patient goals for therapy: decreased pain, increased motion, increased strength and return to sport/leisure activities  Patient's goals regarding treatment: return to aqua aerobics.  Pain  Current pain rating: 3  At best pain ratin  At worst pain ratin  Pain location: distal ulna and  radius of left wrist.  Quality: shooting.  Alleviating factors: aleve at night time.  Aggravating factors: lifting (stretching the wrist, sleeping)    Social Support    Employment status: not working  Hand dominance: right        Objective     Observations     Left Wrist/Hand   Positive for edema.     Tenderness     Left Wrist/Hand   No tenderness in the radial styloid process.     Additional Tenderness Details  (+) TTP distal radius     Active Range of Motion     Left Wrist   Wrist flexion: 22 degrees   Wrist extension: 34 degrees   Radial deviation: 18 degrees   Ulnar deviation: 22 degrees     Right Wrist   Wrist flexion: 60 degrees   Wrist extension: 70 degrees     Left Thumb   Kapandji score: 10 degrees      Additional Active Range of Motion Details  Compensatory activation of finger flexors and extensors with L wrist AROM    L FA AROM:  Supination 40 degrees  Pronation 50 degrees  Composite fist 1.5 cm from DPC  Hook fist WNL  Composite extension of digits WNL      Passive Range of Motion     Left Wrist   Wrist flexion: 34 degrees with pain  Wrist extension: 40 degrees with pain    Additional Passive Range of Motion Details  L FA PROM:  Supination 60 degrees  Pronation 60 degrees (pain)  Passive composite flexion WNL    Strength/Myotome Testing     Additional Strength Details  Not tested at this time    Tests     Left Wrist/Hand   Positive extrinsic extensor tightness and extrinsic flexor tightness.       Flowsheet Rows      Flowsheet Row Most Recent Value   PT/OT G-Codes    Current Score 44   Projected Score 63   FOTO information reviewed Yes   Assessment Type Evaluation               Precautions: fracture  DOI- 9/5/24- L DRF (conservative management)  Starting weaning from brace 10/18  MD orders: ROM, strengthening    HEP: TGE's, wrist/FA AROM, wrist AAROM with ball, flexor/extensor stretches  Manuals 10/10            L wrist retromassage x5'            PROM/AAROM L wrist x5'                                                                 Neuro Re-Ed                                                                                                        Ther Ex             Pegs grasping nv            Ball rolls for wrist nv            Wrist AROM Cone nv            FA AROM Cone nv            Wrist maze nv            Finger web nv                                                                                                                    Ther Activity                                       Gait Training                                       Modalities             MHP L wrist pre exercise nv

## 2024-10-15 ENCOUNTER — OFFICE VISIT (OUTPATIENT)
Dept: PHYSICAL THERAPY | Facility: MEDICAL CENTER | Age: 64
End: 2024-10-15
Payer: COMMERCIAL

## 2024-10-15 DIAGNOSIS — S52.572D OTHER CLOSED INTRA-ARTICULAR FRACTURE OF DISTAL END OF LEFT RADIUS WITH ROUTINE HEALING, SUBSEQUENT ENCOUNTER: Primary | ICD-10-CM

## 2024-10-15 PROCEDURE — 97110 THERAPEUTIC EXERCISES: CPT | Performed by: PHYSICAL THERAPIST

## 2024-10-15 PROCEDURE — 97140 MANUAL THERAPY 1/> REGIONS: CPT | Performed by: PHYSICAL THERAPIST

## 2024-10-15 NOTE — PROGRESS NOTES
Daily Note     Today's date: 10/15/2024  Patient name: Vernell Prescott  : 1960  MRN: [id number]  Referring provider: Wolf Sebastian DO  Dx:   Encounter Diagnosis     ICD-10-CM    1. Other closed intra-articular fracture of distal end of left radius with routine healing, subsequent encounter  S52.572D                      Subjective: Patient states she has been compliant with home exercises. She reports the wrist flexor and extensor stretches do give her some trouble.       Objective: See treatment diary below      Assessment: Today was patient's first treatment session back since initial evaluation. Patient has already demonstrated improvements in ROM. Wrist flexion/extension 45/50 degrees. Full composite fist. Tolerated passive stretching well. Initiated therapeutic exercises as indicated in flow sheet for wrist mobility. Patient tolerated all exercises well. She should continue with HEP as previously prescribed. Patient would benefit from continued PT to address left wrist mobility and strength impairments to facilitate return to pre-injury level of functioning.       Plan: Continue per plan of care.  Progress treatment as tolerated.  Starting weaning from brace at next visit.      Precautions: fracture  DOI- 24- L DRF (conservative management)  Starting weaning from brace 10/18  MD orders: ROM, strengthening    HEP: TGE's, wrist/FA AROM, wrist AAROM with ball, flexor/extensor stretches  Manuals 10/10 10/15           L wrist retromassage x5' x5'           PROM/AAROM L wrist x5' x10'                                                               Neuro Re-Ed                                                                                                        Ther Ex             Pegs grasping nv X3'           Ball rolls for wrist nv x3'           Wrist AROM Cone nv Cone x30 ea           FA AROM Cone nv Cone x3'           Wrist maze nv x3'           Finger web nv Ylw x30                                                                                                                    Ther Activity                                       Gait Training                                       Modalities             P L wrist pre exercise nv x10'

## 2024-10-18 ENCOUNTER — OFFICE VISIT (OUTPATIENT)
Dept: PHYSICAL THERAPY | Facility: MEDICAL CENTER | Age: 64
End: 2024-10-18
Payer: COMMERCIAL

## 2024-10-18 DIAGNOSIS — S52.572D OTHER CLOSED INTRA-ARTICULAR FRACTURE OF DISTAL END OF LEFT RADIUS WITH ROUTINE HEALING, SUBSEQUENT ENCOUNTER: Primary | ICD-10-CM

## 2024-10-18 PROCEDURE — 97110 THERAPEUTIC EXERCISES: CPT | Performed by: PHYSICAL THERAPIST

## 2024-10-18 PROCEDURE — 97140 MANUAL THERAPY 1/> REGIONS: CPT | Performed by: PHYSICAL THERAPIST

## 2024-10-18 NOTE — PROGRESS NOTES
Daily Note     Today's date: 10/18/2024  Patient name: Vernell Prescott  : 1960  MRN: [id number]  Referring provider: Wolf Sebastian DO  Dx:   Encounter Diagnosis     ICD-10-CM    1. Other closed intra-articular fracture of distal end of left radius with routine healing, subsequent encounter  S52.572D                      Subjective: Patient reports no issues or increase in pain after last visit.       Objective: See treatment diary below    AROM  Wrist flexion 50 degrees  Wrist extension 62 degrees    Assessment: Patient is making continuous progress in left wrist mobility following L DRF. She has functional ROM of the wrist at this point. She has slight discomfort at end ranges with forearm rotation. She should continue with light activity as tolerated and start weaning out of brace. Patient would benefit from continued PT to address left wrist mobility and strength impairments to facilitate return to pre-injury level of functioning.       Plan: Continue per plan of care.  Progress treatment as tolerated.       Precautions: fracture  DOI- 24- L DRF (conservative management)  Starting weaning from brace 10/18  MD orders: ROM, strengthening    HEP: TGE's, wrist/FA AROM, wrist AAROM with ball, flexor/extensor stretches  Manuals 10/10 10/15 10/18          L wrist retromassage x5' x5' x5'          PROM/AAROM L wrist x5' x10' x10'                                                              Neuro Re-Ed                                                                                                        Ther Ex             Pegs grasping nv X3' x3'          Ball rolls for wrist nv x3' x3'          Wrist AROM Cone nv Cone x30 ea Cone x30          FA AROM Cone nv Cone x3' Cone x3'          Wrist maze nv x3' 3'          Finger web nv Ylw x30 Ylw x30                                                                                                                  Ther Activity                                       Gait  Training                                       Modalities             DEOP L wrist pre exercise nv x10' x10'

## 2024-10-23 ENCOUNTER — OFFICE VISIT (OUTPATIENT)
Dept: PHYSICAL THERAPY | Facility: MEDICAL CENTER | Age: 64
End: 2024-10-23
Payer: COMMERCIAL

## 2024-10-23 DIAGNOSIS — S52.572D OTHER CLOSED INTRA-ARTICULAR FRACTURE OF DISTAL END OF LEFT RADIUS WITH ROUTINE HEALING, SUBSEQUENT ENCOUNTER: Primary | ICD-10-CM

## 2024-10-23 PROCEDURE — 97110 THERAPEUTIC EXERCISES: CPT | Performed by: PHYSICAL THERAPIST

## 2024-10-23 PROCEDURE — 97140 MANUAL THERAPY 1/> REGIONS: CPT | Performed by: PHYSICAL THERAPIST

## 2024-10-23 NOTE — PROGRESS NOTES
Daily Note     Today's date: 10/23/2024  Patient name: Vernell Prescott  : 1960  MRN: [id number]  Referring provider: Wolf Sebastian DO  Dx:   Encounter Diagnosis     ICD-10-CM    1. Other closed intra-articular fracture of distal end of left radius with routine healing, subsequent encounter  S52.572D                      Subjective: Patient states she has been doing more cooking. She made a meal recently that required some chopping and found she was sore in her wrist after. She did also add an exercise in that she thought would be helpful but felt a sharp pain and her wrist has been sore since.       Objective: See treatment diary below    AROM  Wrist flexion 50 degrees  Wrist extension 60 degrees    Assessment: Patient has soreness in her IP joints and on the ulnar side of her wrist. She also has some mild stiffness in her hand with composite flexion. Wrist and finger mobility both improved with passive stretching. Added in towel bunches with intrinsic minus focus which also helped with IP stiffness. Patient hesitant to add weight today so we will wait until next visit. Discussed weaning from brace over the next week in which patient verbalized understanding.   Patient would benefit from continued PT to address left wrist mobility and strength impairments to facilitate return to pre-injury level of functioning.       Plan: Continue per plan of care.  Progress treatment as tolerated.       Precautions: fracture  DOI- 24- L DRF (conservative management)  Starting weaning from brace 10/18  MD orders: ROM, strengthening    HEP: TGE's, wrist/FA AROM, wrist AAROM with ball, flexor/extensor stretches  Manuals 10/10 10/15 10/18 10/23         L wrist retromassage x5' x5' x5' x5'         PROM/AAROM L wrist x5' x10' x10' x10'                                                             Neuro Re-Ed                                                                                                        Ther Ex              Towel bunches    x3'         Pegs grasping nv X3' x3' x3'         Ball rolls for wrist nv x3' x3' x3'         Wrist AROM Cone nv Cone x30 ea Cone x30 Cone x30 ea         FA AROM Cone nv Cone x3' Cone x3' Cone x3'         Wrist maze nv x3' 3' x3'         Finger web nv Ylw x30 Ylw x30 Red x30                                                                                                                 Ther Activity                                       Gait Training                                       Modalities             MHP L wrist pre exercise nv x10' x10' x10'

## 2024-10-25 ENCOUNTER — OFFICE VISIT (OUTPATIENT)
Dept: PHYSICAL THERAPY | Facility: MEDICAL CENTER | Age: 64
End: 2024-10-25
Payer: COMMERCIAL

## 2024-10-25 DIAGNOSIS — S52.572D OTHER CLOSED INTRA-ARTICULAR FRACTURE OF DISTAL END OF LEFT RADIUS WITH ROUTINE HEALING, SUBSEQUENT ENCOUNTER: Primary | ICD-10-CM

## 2024-10-25 PROCEDURE — 97140 MANUAL THERAPY 1/> REGIONS: CPT | Performed by: PHYSICAL THERAPIST

## 2024-10-25 PROCEDURE — 97110 THERAPEUTIC EXERCISES: CPT | Performed by: PHYSICAL THERAPIST

## 2024-10-25 NOTE — PROGRESS NOTES
Daily Note     Today's date: 10/25/2024  Patient name: Vernell Prescott  : 1960  MRN: [id number]  Referring provider: Wolf Sebastian DO  Dx:   Encounter Diagnosis     ICD-10-CM    1. Other closed intra-articular fracture of distal end of left radius with routine healing, subsequent encounter  S52.572D                        Subjective: Patient states her soreness has resolved. She does still get occasional ulnar sided wrist pain.       Objective: See treatment diary below    Assessment: Patient has functional ROM of the wrist. No stiffness present in the digits today.   Able to progress with light strengthening today which she did tolerate well with greatest challenge with wrist strengthening.  Patient did report muscular fatigue post exercise performance.   In regards to ulnar sided wrist pain I did educate her on possible abutment of the ulnocarpal joint and to avoid painful rotation and ulnar deviation.   Patient would benefit from continued PT to address left wrist mobility and strength impairments to facilitate return to pre-injury level of functioning.       Plan: Continue per plan of care.  Progress treatment as tolerated.       Precautions: fracture  DOI- 24- L DRF (conservative management)  Starting weaning from brace 10/18  MD orders: ROM, strengthening    HEP: TGE's, wrist/FA AROM, wrist AAROM with ball, flexor/extensor stretches  Manuals 10/10 10/15 10/18 10/23 10/25        L wrist retromassage x5' x5' x5' x5' x5'        PROM/AAROM L wrist x5' x10' x10' x10' x10'                                                            Neuro Re-Ed                                                                                                        Ther Ex             Towel bunches    x3' x3'        Pegs grasping nv X3' x3' x3' Putty press/pull pink x30        Ball rolls for wrist nv x3' x3' x3' X3'        Wrist AROM Cone nv Cone x30 ea Cone x30 Cone x30 ea 1# x20 ea        FA AROM Cone nv Cone x3' Cone x3'  Cone x3' Cone x3'        Wrist maze nv x3' 3' x3' x3'        Finger web nv Ylw x30 Ylw x30 Red x30 Red x30        Flex bar                                                                                                        Ther Activity                                       Gait Training                                       Modalities             MHP L wrist pre exercise nv x10' x10' x10' x10'

## 2024-10-30 ENCOUNTER — OFFICE VISIT (OUTPATIENT)
Dept: PHYSICAL THERAPY | Facility: MEDICAL CENTER | Age: 64
End: 2024-10-30
Payer: COMMERCIAL

## 2024-10-30 DIAGNOSIS — S52.572D OTHER CLOSED INTRA-ARTICULAR FRACTURE OF DISTAL END OF LEFT RADIUS WITH ROUTINE HEALING, SUBSEQUENT ENCOUNTER: Primary | ICD-10-CM

## 2024-10-30 PROCEDURE — 97110 THERAPEUTIC EXERCISES: CPT | Performed by: PHYSICAL THERAPIST

## 2024-10-30 PROCEDURE — 97140 MANUAL THERAPY 1/> REGIONS: CPT | Performed by: PHYSICAL THERAPIST

## 2024-10-30 NOTE — PROGRESS NOTES
Daily Note     Today's date: 10/30/2024  Patient name: Vernell Prescott  : 1960  MRN: [id number]  Referring provider: Wolf Sebastian DO  Dx:   Encounter Diagnosis     ICD-10-CM    1. Other closed intra-articular fracture of distal end of left radius with routine healing, subsequent encounter  S52.572D                        Subjective: Patient denies any soreness after last visit.       Objective: See treatment diary below    Assessment: Patient presents with decreased welling and improved ROM. She has about 50-60 degrees of flexion and 60-65 degrees of extension.   We continued to progress with light strengthening today which she did tolerate well but did report muscular fatigue and mild soreness at end of session.   Patient would benefit from continued PT to address left wrist mobility and strength impairments to facilitate return to pre-injury level of functioning.       Plan: Continue per plan of care.  Progress treatment as tolerated.       Precautions: fracture  DOI- 24- L DRF (conservative management)  Starting weaning from brace 10/18  MD orders: ROM, strengthening    HEP: TGE's, wrist/FA AROM, wrist AAROM with ball, flexor/extensor stretches  Manuals 10/10 10/15 10/18 10/23 10/25 10       L wrist retromassage x5' x5' x5' x5' x5'        PROM/AAROM L wrist x5' x10' x10' x10' x10' x10'                                                           Neuro Re-Ed                                                                                                        Ther Ex             Towel bunches    x3' x3' Plate x3'       Pegs grasping nv X3' x3' x3' Putty press/pull pink x30 Putty press/pull pink x30       Ball rolls for wrist nv x3' x3' x3' X3' x3'       Wrist AROM Cone nv Cone x30 ea Cone x30 Cone x30 ea 1# x20 ea 1# x30 ea       FA AROM Cone nv Cone x3' Cone x3' Cone x3' Cone x3' Cone x3'       Wrist maze nv x3' 3' x3' x3' x3'       Finger web nv Ylw x30 Ylw x30 Red x30 Red x30 Green x30       Flex bar       Ylw x20 ea                                                                                                  Ther Activity                                       Gait Training                                       Modalities             MHP L wrist pre exercise nv x10' x10' x10' x10' x10'                          I have personally performed a face to face diagnostic evaluation on this patient. I have reviewed the ACP note and agree with the history, exam and plan of care, except as noted.

## 2024-11-01 ENCOUNTER — OFFICE VISIT (OUTPATIENT)
Dept: PHYSICAL THERAPY | Facility: MEDICAL CENTER | Age: 64
End: 2024-11-01
Payer: COMMERCIAL

## 2024-11-01 DIAGNOSIS — S52.572D OTHER CLOSED INTRA-ARTICULAR FRACTURE OF DISTAL END OF LEFT RADIUS WITH ROUTINE HEALING, SUBSEQUENT ENCOUNTER: Primary | ICD-10-CM

## 2024-11-01 PROCEDURE — 97140 MANUAL THERAPY 1/> REGIONS: CPT | Performed by: PHYSICAL THERAPIST

## 2024-11-01 PROCEDURE — 97110 THERAPEUTIC EXERCISES: CPT | Performed by: PHYSICAL THERAPIST

## 2024-11-01 NOTE — PROGRESS NOTES
Daily Note     Today's date: 2024  Patient name: Vernell Prescott  : 1960  MRN: [id number]  Referring provider: Wolf Sebastian DO  Dx:   Encounter Diagnosis     ICD-10-CM    1. Other closed intra-articular fracture of distal end of left radius with routine healing, subsequent encounter  S52.572D                        Subjective: Patient states she is not experiencing as much stiffness in the fingers and wrist this morning.       Objective: See treatment diary below    Assessment: Patient continues to make progress in ROM. She has about 55-60 degrees of flexion and 60-65 degrees of extension.   Continued to progress strengthening for wrist, forearm, and hand. Patient did fatigue with increased resistance. She is still have mild discomfort on the ulnar side of the wrist. I suggested we could trial some taping but patient would like to hold at this time.   Emphasized continued stretching next week while she is away on vacation to minimize stiffness.   Patient would benefit from continued PT to address left wrist mobility and strength impairments to facilitate return to pre-injury level of functioning.       Plan: Continue per plan of care.  Progress treatment as tolerated.       Precautions: fracture  DOI- 24- L DRF (conservative management)  Starting weaning from brace 10/18  MD orders: ROM, strengthening    HEP: TGE's, wrist/FA AROM, wrist AAROM with ball, flexor/extensor stretches  Manuals 10/10 10/15 10/18 10/23 10/25 10/30 11/1      L wrist retromassage x5' x5' x5' x5' x5'        PROM/AAROM L wrist x5' x10' x10' x10' x10' x10' x10'                                                          Neuro Re-Ed                                                                                                        Ther Ex             Towel bunches    x3' x3' Plate x3' Plate x3'      Pegs grasping nv X3' x3' x3' Putty press/pull pink x30 Putty press/pull pink x30 Putty press/ pull pink x30      Ball rolls for wrist  nv x3' x3' x3' X3' x3' x3'      Wrist AROM Cone nv Cone x30 ea Cone x30 Cone x30 ea 1# x20 ea 1# x30 ea 2# x15 ea      FA AROM Cone nv Cone x3' Cone x3' Cone x3' Cone x3' Cone x3' Hammer high hold x20      Wrist maze nv x3' 3' x3' x3' x3' x3'      Finger web nv Ylw x30 Ylw x30 Red x30 Red x30 Green x30 Green x30      Flex bar      Ylw x20 ea Ylw x20 ea                                                                                                 Ther Activity                                       Gait Training                                       Modalities             MHP L wrist pre exercise nv x10' x10' x10' x10' x10' x10'

## 2024-11-03 DIAGNOSIS — G40.209 LOCALIZATION-RELATED FOCAL EPILEPSY WITH COMPLEX PARTIAL SEIZURES (HCC): ICD-10-CM

## 2024-11-04 RX ORDER — LAMOTRIGINE 200 MG/1
TABLET ORAL
Qty: 180 TABLET | Refills: 0 | Status: SHIPPED | OUTPATIENT
Start: 2024-11-04

## 2024-11-13 ENCOUNTER — OFFICE VISIT (OUTPATIENT)
Dept: OBGYN CLINIC | Facility: CLINIC | Age: 64
End: 2024-11-13

## 2024-11-13 ENCOUNTER — APPOINTMENT (OUTPATIENT)
Dept: RADIOLOGY | Facility: AMBULARY SURGERY CENTER | Age: 64
End: 2024-11-13
Attending: ORTHOPAEDIC SURGERY
Payer: COMMERCIAL

## 2024-11-13 ENCOUNTER — OFFICE VISIT (OUTPATIENT)
Dept: PHYSICAL THERAPY | Facility: MEDICAL CENTER | Age: 64
End: 2024-11-13
Payer: COMMERCIAL

## 2024-11-13 VITALS
HEART RATE: 75 BPM | WEIGHT: 177 LBS | DIASTOLIC BLOOD PRESSURE: 88 MMHG | HEIGHT: 66 IN | BODY MASS INDEX: 28.45 KG/M2 | SYSTOLIC BLOOD PRESSURE: 133 MMHG

## 2024-11-13 DIAGNOSIS — Z01.89 ENCOUNTER FOR LOWER EXTREMITY COMPARISON IMAGING STUDY: ICD-10-CM

## 2024-11-13 DIAGNOSIS — S82.831A OTHER CLOSED FRACTURE OF DISTAL END OF RIGHT FIBULA, INITIAL ENCOUNTER: ICD-10-CM

## 2024-11-13 DIAGNOSIS — S82.831A OTHER CLOSED FRACTURE OF DISTAL END OF RIGHT FIBULA, INITIAL ENCOUNTER: Primary | ICD-10-CM

## 2024-11-13 DIAGNOSIS — S52.572D OTHER CLOSED INTRA-ARTICULAR FRACTURE OF DISTAL END OF LEFT RADIUS WITH ROUTINE HEALING, SUBSEQUENT ENCOUNTER: Primary | ICD-10-CM

## 2024-11-13 PROCEDURE — 97140 MANUAL THERAPY 1/> REGIONS: CPT | Performed by: PHYSICAL THERAPIST

## 2024-11-13 PROCEDURE — 73600 X-RAY EXAM OF ANKLE: CPT

## 2024-11-13 PROCEDURE — 99024 POSTOP FOLLOW-UP VISIT: CPT | Performed by: ORTHOPAEDIC SURGERY

## 2024-11-13 PROCEDURE — 73610 X-RAY EXAM OF ANKLE: CPT

## 2024-11-13 PROCEDURE — 97110 THERAPEUTIC EXERCISES: CPT | Performed by: PHYSICAL THERAPIST

## 2024-11-13 NOTE — PROGRESS NOTES
Daily Note     Today's date: 2024  Patient name: Vernell Prescott  : 1960  MRN: [id number]  Referring provider: Wolf Sebastian DO  Dx:   Encounter Diagnosis     ICD-10-CM    1. Other closed intra-articular fracture of distal end of left radius with routine healing, subsequent encounter  S52.572D                        Subjective: Patient reports she remained compliant with home exercises while away on vacation. She states lifting her suitcase was a challenge bu overall feels her mobility and strength continue to improve.       Objective: See treatment diary below    Outcome measures: FOTO = 51 (improved from 44 at intake, predicted score 63)    Assessment:   AROM/PROM wrist flexion = 50/60 degrees  AROM/PROM wrist extension = 60/65 degrees  FA rotation AROM supination/pronation  =  70/60 degrees, pain at end ranges.  Patient is unfortunately still experiencing ulnocarpal pain at end ranges of FA rotation. Working on wrist proprioceptive awareness with rotational movements.   Patient would benefit from continued PT to address left wrist mobility and strength impairments to facilitate return to pre-injury level of functioning.       Plan: Continue per plan of care.  Progress treatment as tolerated.       Precautions: fracture  DOI- 24- L DRF (conservative management)  Starting weaning from brace 10/18  MD orders: ROM, strengthening    HEP: TGE's, wrist/FA AROM, wrist AAROM with ball, flexor/extensor stretches  Manuals 10/10 10/15 10/18 10/23 10/25 10/30 11/1 11/13     L wrist retromassage x5' x5' x5' x5' x5'        PROM/AAROM L wrist x5' x10' x10' x10' x10' x10' x10' x10'                                                         Neuro Re-Ed                                                                                                        Ther Ex             Towel bunches    x3' x3' Plate x3' Plate x3' np     Pegs grasping nv X3' x3' x3' Putty press/pull pink x30 Putty press/pull pink x30 Putty press/  pull pink x30 Putty press/pull pink x30     Ball rolls for wrist nv x3' x3' x3' X3' x3' x3' x3'     Wrist AROM Cone nv Cone x30 ea Cone x30 Cone x30 ea 1# x20 ea 1# x30 ea 2# x15 ea 2# x30 ea     FA AROM Cone nv Cone x3' Cone x3' Cone x3' Cone x3' Cone x3' Hammer high hold x20 Hammer mid hold x30     Wrist maze nv x3' 3' x3' x3' x3' x3' x3'     Finger web nv Ylw x30 Ylw x30 Red x30 Red x30 Green x30 Green x30 Blue x30     Flex bar      Ylw x20 ea Ylw x20 ea Ylw x30 ea                                                                                                Ther Activity                                       Gait Training                                       Modalities             MHP L wrist pre exercise nv x10' x10' x10' x10' x10' x10' x10'

## 2024-11-13 NOTE — PROGRESS NOTES
James R Lachman, M.D.  Attending, Orthopaedic Surgery  Foot and Ankle  [facility]      ORTHOPAEDIC FOOT AND ANKLE CLINIC VISIT     Assessment:     Encounter Diagnoses   Name Primary?    Other closed fracture of distal end of right fibula, initial encounter Yes    Encounter for lower extremity comparison imaging study             Plan:   The patient verbalized understanding of exam findings and treatment plan. We engaged in the shared decision-making process and treatment options were discussed at length with the patient. Surgical and conservative management discussed today along with risks and benefits.  Patient sustained non-displaced presley A right distal fibula fracture from inversion injury 4/30/2024  Managing non-operatively  XRs today are stable. Minimal interval healing  WBAT in supportive shoe  Continue vit D and calcium   OTC pain medication, ice, elevation, compression stocking for pain and swelling control         History of Present Illness:   Chief Complaint:   Chief Complaint   Patient presents with    Left Ankle - Pain     Fell 9/5/24. Starting to bother her now.     Follow-up     Follow up of the right ankle. Everything going well.      Vernell Prescott is a 63 y.o. female who is being seen in follow-up for Right ankle rossy A. When we last saw she we recommended wbat in supportive sneaker.  Pain has improved.     Pain/symptom timing:  no pain at this time  Pain/symptom context:  no pain at this time  Pain/symptom modifying factors:  no pain at this time  Pain/symptom associated signs/symptoms: none    Prior treatment   NSAIDsYes   Injections No   Bracing/Orthotics Yes    Physical Therapy No     Orthopedic Surgical History:   See below    Past Medical, Surgical and Social History:  Past Medical History:  has a past medical history of Hypertension, Seizure (HCC), and Thyroid disease.  Problem List: does not have any pertinent problems on file.  Past Surgical History:  has a past  "surgical history that includes Brain surgery; Ovarian cyst surgery; Hysterectomy; [place] tooth extraction; pr open tx distal fibular fracture lat malleolus (Left, 05/23/2022); and Craniotomy (12/28/1987).  Family History: family history includes Heart disease in her father; Seizures in her mother; Stroke in her mother; Thyroid disease in her mother.  Social History:  reports that she quit smoking about 21 years ago. Her smoking use included cigarettes. She started smoking about 31 years ago. She has a 5 pack-year smoking history. She has never used smokeless tobacco. She reports that she does not drink alcohol and does not use drugs.  Current Medications: has a current medication list which includes the following prescription(s): briviact, calcium carbonate-vitamin d, b-12, lacosamide, lamotrigine, pantoprazole, zonisamide, calcium carb-cholecalciferol, cholecalciferol, and meclizine.  Allergies: has no known allergies.     Review of Systems:  General- denies fever/chills  HEENT- denies hearing loss or sore throat  Eyes- denies eye pain or visual disturbances, denies red eyes  Respiratory- denies cough or SOB  Cardio- denies chest pain or palpitations  GI- denies abdominal pain  Endocrine- denies urinary frequency  Urinary- denies pain with urination  Musculoskeletal- Negative except noted above  Skin- denies rashes or wounds  Neurological- denies dizziness or headache  Psychiatric- denies anxiety or difficulty concentrating    Physical Exam:   /88   Pulse 75   Ht 5' 5.5\" (1.664 m)   Wt 80.3 kg (177 lb)   BMI 29.01 kg/m²   General/Constitutional: No apparent distress: well-nourished and well developed.  Eyes: normal ocular motion  Lymphatic: No appreciable lymphadenopathy  Respiratory: Non-labored breathing  Vascular: No edema, swelling or tenderness, except as noted in detailed exam.  Integumentary: No impressive skin lesions present, except as noted in detailed exam.  Neuro: No ataxia or tremors " noted  Psych: Normal mood and affect, oriented to person, place and time. Appropriate affect.  Musculoskeletal: Normal, except as noted in detailed exam and in HPI.    Examination    Right    Gait Normal   Musculoskeletal NTP    Skin Normal.      Nails Normal    Range of Motion  30 degrees dorsiflexion, 50 degrees plantarflexion  Subtalar motion: normal    Stability Stable    Muscle Strength 5/5 tibialis anterior  5/5 gastrocnemius-soleus  5/5 posterior tibialis  5/5 peroneal/eversion strength  5/5 EHL  5/5 FHL    Neurologic Normal    Sensation Intact to light touch throughout sural, saphenous, superficial peroneal, deep peroneal and medial/lateral plantar nerve distributions.  [place] 5.07 filament (10g) testing deferred.    Cardiovascular Brisk capillary refill < 2 seconds,intact DP and PT pulses    Special Tests None      Imaging Studies:     3 views of the Right ankle were obtained, reviewed and interpreted independently which demonstrate well healing rossy A distal fibular fracture. Reviewed by me personally.        James R. Lachman, MD  Foot & Ankle Surgery   Department of Orthopaedic Surgery  [facility]      I personally performed the service.    James R. Lachman, MD

## 2024-11-15 ENCOUNTER — OFFICE VISIT (OUTPATIENT)
Dept: PHYSICAL THERAPY | Facility: MEDICAL CENTER | Age: 64
End: 2024-11-15
Payer: COMMERCIAL

## 2024-11-15 DIAGNOSIS — S52.572D OTHER CLOSED INTRA-ARTICULAR FRACTURE OF DISTAL END OF LEFT RADIUS WITH ROUTINE HEALING, SUBSEQUENT ENCOUNTER: Primary | ICD-10-CM

## 2024-11-15 PROCEDURE — 97110 THERAPEUTIC EXERCISES: CPT | Performed by: PHYSICAL THERAPIST

## 2024-11-15 PROCEDURE — 97140 MANUAL THERAPY 1/> REGIONS: CPT | Performed by: PHYSICAL THERAPIST

## 2024-11-15 NOTE — PROGRESS NOTES
Daily Note     Today's date: 11/15/2024  Patient name: Vernell Prescott  : 1960  MRN: [id number]  Referring provider: Wolf Sebastian DO  Dx:   Encounter Diagnosis     ICD-10-CM    1. Other closed intra-articular fracture of distal end of left radius with routine healing, subsequent encounter  S52.572D                        Subjective: Overall, left wrist continues to improve each week with noticeable improvement in use of her wrist with activity. She reports some difficulty still with lifting heavy grocery bags. She does still experience ulnar sided wrist pain with rotational movements.       Objective: See treatment diary below        Assessment:   AROM/PROM wrist flexion = 50/60 degrees  AROM/PROM wrist extension = 60/65 degrees  FA rotation AROM supination/pronation  =  70/60 degrees, pain at end ranges.  ROM status quo.  Improved motor control with rotational strengthening with only minimal cueing provided  Added in KB carry to mimic grocery bags. She did require rest breaks secondary to fatigue of the wrist and hand.   Trialed wrist widget and bullseye braces for ongoing ulnar wrist pain. Patient found immediate relief of ulnar wrist pain with bullseye brace. She was fitted for a S/M and can purchase independently online.  Patient would benefit from continued PT to address left wrist mobility and strength impairments to facilitate return to pre-injury level of functioning.       Plan: Continue per plan of care.  Progress treatment as tolerated.       Precautions: fracture  DOI- 24- L DRF (conservative management)  Starting weaning from brace 10/18  MD orders: ROM, strengthening    HEP: TGE's, wrist/FA AROM, wrist AAROM with ball, flexor/extensor stretches  Manuals 10/10 10/15 10/18 10/23 10/25 10/30 11/1 11/13 11/15    L wrist retromassage x5' x5' x5' x5' x5'        PROM/AAROM L wrist x5' x10' x10' x10' x10' x10' x10' x10' x10'                                                        Neuro Re-Ed                                                                                                         Ther Ex             Towel bunches    x3' x3' Plate x3' Plate x3' np     Pegs grasping nv X3' x3' x3' Putty press/pull pink x30 Putty press/pull pink x30 Putty press/ pull pink x30 Putty press/pull pink x30 Putty press/pull pink x30    Ball rolls for wrist nv x3' x3' x3' X3' x3' x3' x3' x3'    Wrist AROM Cone nv Cone x30 ea Cone x30 Cone x30 ea 1# x20 ea 1# x30 ea 2# x15 ea 2# x30 ea 2# x30 ea    FA AROM Cone nv Cone x3' Cone x3' Cone x3' Cone x3' Cone x3' Hammer high hold x20 Hammer mid hold x30 Hammer mid hold x30    Wrist maze nv x3' 3' x3' x3' x3' x3' x3' x3'    Finger web nv Ylw x30 Ylw x30 Red x30 Red x30 Green x30 Green x30 Blue x30 Blue x30    Flex bar      Ylw x20 ea Ylw x20 ea Ylw x30 ea Ylw x30 ea    KB carry with towel          YKB x3'                                                                                  Ther Activity                                       Gait Training                                       Modalities             MHP L wrist pre exercise nv x10' x10' x10' x10' x10' x10' x10' x10'

## 2024-11-18 ENCOUNTER — OFFICE VISIT (OUTPATIENT)
Dept: OBGYN CLINIC | Facility: CLINIC | Age: 64
End: 2024-11-18

## 2024-11-18 ENCOUNTER — APPOINTMENT (OUTPATIENT)
Dept: RADIOLOGY | Facility: AMBULARY SURGERY CENTER | Age: 64
End: 2024-11-18
Attending: ORTHOPAEDIC SURGERY
Payer: COMMERCIAL

## 2024-11-18 VITALS — WEIGHT: 177 LBS | BODY MASS INDEX: 28.45 KG/M2 | HEIGHT: 66 IN

## 2024-11-18 DIAGNOSIS — S52.572D OTHER CLOSED INTRA-ARTICULAR FRACTURE OF DISTAL END OF LEFT RADIUS WITH ROUTINE HEALING, SUBSEQUENT ENCOUNTER: ICD-10-CM

## 2024-11-18 DIAGNOSIS — S82.831A OTHER CLOSED FRACTURE OF DISTAL END OF RIGHT FIBULA, INITIAL ENCOUNTER: Primary | ICD-10-CM

## 2024-11-18 DIAGNOSIS — S82.831A OTHER CLOSED FRACTURE OF DISTAL END OF RIGHT FIBULA, INITIAL ENCOUNTER: ICD-10-CM

## 2024-11-18 PROCEDURE — 73110 X-RAY EXAM OF WRIST: CPT

## 2024-11-18 PROCEDURE — 99024 POSTOP FOLLOW-UP VISIT: CPT | Performed by: ORTHOPAEDIC SURGERY

## 2024-11-18 NOTE — PROGRESS NOTES
Assessment:  1. 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 female  with left distal radius fracture sustained on 9/5/2024    Discussed possible TFCC injury at the time of the initial injury. Discussed if continuing with ulnar sided pain in about 6 weeks, we could consider a cortisone injection to the TFCC  Continue with outpatient PT/OT hand therapy, new referral provided at today's visit  Patient can use TFCC brace during activity, explained this brace can take pressure of the TFCC  Follow up as needed        The patient was seen and examined by Dr. Sebastian and myself. The assessment and plan were formulated by Dr. Sebastian and I assisted in carrying it out.        To Do Next Visit:       Subjective:   Patient ID: Vernell Prescott is a 63 y.o. female .    HPI    Patient presents to the office for follow up of left wrist distal radius fracture sustained on 9/5/2024. Since the last visit, the patient reports she has improvement in there pain and wrist motion since her last visit. She does have some ulnar sided wrist pain at today's visit. She notes she has no pain at rest, but pain with active wrist motion. Patient rates their pain as 4/10. She has been going to PT/OT hand therapy, which she notes is going well overall. She is overall happy with her  progress at this time.     The following portions of the patient's history were reviewed and updated as appropriate: allergies, current medications, past family history, past social history, past surgical history and problem list.    Social History     Socioeconomic History    Marital status: Single     Spouse name: Not on file    Number of children: Not on file    Years of education: Not on file    Highest education level: Not on file   Occupational History    Not on file   Tobacco Use    Smoking status: Former     Current packs/day: 0.00     Average packs/day: 0.5 packs/day for 10.0 years (5.0 ttl pk-yrs)     Types: Cigarettes     Start date: 1993     Quit date: 2003     Years since quittin.5    Smokeless tobacco: Never   Vaping Use    Vaping status: Never Used   Substance and Sexual Activity    Alcohol use: No    Drug use: Never    Sexual activity: Never   Other Topics Concern    Not on file   Social History Narrative    Lives in [place].      Social Drivers of Health     Financial Resource Strain: Low Risk  (2024)    [facility]    Overall Financial Resource Strain (CARDIA)     Difficulty of Paying Living Expenses: Not very hard   Food Insecurity: No Food Insecurity (2024)    [facility]    Hunger Vital Sign     Worried About Running Out of Food in the Last Year: Never true     Ran Out of Food in the Last Year: Never true   Transportation Needs: No Transportation Needs (2024)    [facility]    PRAPARE - Transportation     Lack of Transportation (Medical): No     Lack of Transportation (Non-Medical): No   Physical Activity: Not on file   Stress: Not on file   Social Connections: Not on file   Intimate Partner Violence: Not At Risk (2024)    [facility]    Humiliation, Afraid, Rape, and Kick questionnaire     Fear of Current or Ex-Partner: No     Emotionally Abused: No     Physically Abused: No     Sexually Abused: No   Housing Stability: Low Risk  (1/8/2024)    [facility]    Housing Stability Vital Sign     Unable to Pay for Housing in the Last Year: No     Number of Places Lived in the Last Year: 1     Unstable Housing in the Last Year: No     Past Medical History:   Diagnosis Date    Hypertension     Seizure (HCC)     Thyroid disease      Past Surgical History:   Procedure Laterality Date    BRAIN SURGERY      CRANIOTOMY  12/28/1987    HYSTERECTOMY      OVARIAN CYST SURGERY      NC OPEN TX DISTAL FIBULAR FRACTURE LAT MALLEOLUS Left 05/23/2022    Procedure: OPEN REDUCTION W/ INTERNAL FIXATION (ORIF) ANKLE;  Surgeon: James R Lachman, MD;  Location:  MAIN OR;  Service: Orthopedics    WISDOM TOOTH EXTRACTION       No Known Allergies  Current Outpatient Medications on File Prior to Visit   Medication Sig Dispense Refill    Briviact 100 MG TABS TAKE 1 TABLET TWICE A  tablet 1    Calcium Carb-Cholecalciferol 600-10 MG-MCG TABS Take 1 tablet by mouth 2 (two) times a day (Patient not taking: Reported on 1/7/2024)      Calcium Carbonate-Vitamin D 600-200 MG-UNIT TABS Take 1 tablet by mouth 2 (two) times a day      Cholecalciferol 50 MCG (2000 UT) CAPS Take 1 capsule by mouth in the morning. (Patient not taking: Reported on 9/5/2024)      Cyanocobalamin (B-12) 1000 MCG CAPS in the morning      lacosamide (VIMPAT) 100 mg tablet TAKE 1 TABLET EVERY MORNING AND 2 TABLETS (200 MG) EVERY EVENING 270 tablet 1    lamoTRIgine (LaMICtal) 200 MG tablet TAKE 1 TABLET TWICE A  tablet 0    meclizine (ANTIVERT) 12.5 MG tablet Take 1 tablet (12.5 mg total) by mouth 3 (three) times a day as needed for dizziness 30 tablet 0    pantoprazole (PROTONIX) 40 mg tablet Take 40 mg by mouth daily       zonisamide (ZONEGRAN) 100 mg capsule Taking 100mg in am and 200mg at night 270 capsule 3     No current facility-administered medications on file prior to visit.       Review of Systems   Constitutional:  Negative for chills and fever.   HENT:  Negative for ear pain and sore throat.    Eyes:  Negative for pain and visual disturbance.   Respiratory:  Negative for cough and shortness of breath.    Cardiovascular:  Negative for chest pain and palpitations.   Gastrointestinal:  Negative for abdominal pain and vomiting.   Genitourinary:  Negative for dysuria and hematuria.   Musculoskeletal:  Negative for arthralgias and back pain.   Skin:  Negative for color change and rash.   Neurological:  Negative for seizures and syncope.   All other systems reviewed and are negative.    See HPi    Objective:    There were no vitals filed for this visit.    Physical Exam  Vitals and nursing note reviewed.   Constitutional:       General: She is not in acute distress.     Appearance: She is well-developed.   HENT:      Head: Normocephalic and atraumatic.   Eyes:      Conjunctiva/sclera: Conjunctivae normal.   Cardiovascular:      Rate and Rhythm: Normal rate and regular rhythm.      Heart sounds: No murmur heard.  Pulmonary:      Effort: Pulmonary effort is normal. No respiratory distress.      Breath sounds: Normal breath sounds.   Abdominal:      Palpations: Abdomen is soft.      Tenderness: There is no abdominal tenderness.   Musculoskeletal:         General: No swelling.      Cervical back: Neck supple.   Skin:     General: Skin is warm and dry.      Capillary Refill: Capillary refill takes less than 2 seconds.   Neurological:      Mental Status: She is alert.   Psychiatric:         Mood and Affect: Mood normal.         Left Hand Exam     Tenderness   The patient is experiencing no tenderness.     Other   Erythema: absent  Scars: absent  Sensation: normal  Pulse: present    Comments:  Full composite fist            I have  "personally reviewed pertinent films in PACS and my interpretation is xr of left wrist demonstrates stable alignment of nondisplaced distal radius fracture with interval healing present.    Procedures  No Procedures performed today           Scribe Attestation      I,:  Franky Vital am acting as a scribe while in the presence of the attending physician.:       I,:  Wolf Sebastian, DO personally performed the services described in this documentation    as scribed in my presence.:                Portions of the record may have been created with voice recognition software.  Occasional wrong word or \"sound a like\" substitutions may have occurred due to the inherent limitations of voice recognition software.  Read the chart carefully and recognize, using context, where substitutions have occurred.   "

## 2024-11-20 ENCOUNTER — OFFICE VISIT (OUTPATIENT)
Dept: PHYSICAL THERAPY | Facility: MEDICAL CENTER | Age: 64
End: 2024-11-20
Payer: COMMERCIAL

## 2024-11-20 DIAGNOSIS — S52.572D OTHER CLOSED INTRA-ARTICULAR FRACTURE OF DISTAL END OF LEFT RADIUS WITH ROUTINE HEALING, SUBSEQUENT ENCOUNTER: Primary | ICD-10-CM

## 2024-11-20 PROCEDURE — 97140 MANUAL THERAPY 1/> REGIONS: CPT | Performed by: PHYSICAL THERAPIST

## 2024-11-20 PROCEDURE — 97110 THERAPEUTIC EXERCISES: CPT | Performed by: PHYSICAL THERAPIST

## 2024-11-20 NOTE — PROGRESS NOTES
Daily Note     Today's date: 2024  Patient name: Vernell Prescott  : 1960  MRN: [id number]  Referring provider: Wolf Sebastian DO  Dx:   Encounter Diagnosis     ICD-10-CM    1. Other closed intra-articular fracture of distal end of left radius with routine healing, subsequent encounter  S52.572D                        Subjective: Patient followed up with Dr. Sebastian. Recommended to continue with PT for more strengthening. Patient continues to report difficulty with pulling fitted sheets to make her bed, cook, and cut food all secondary to strength. She did purchase bullseye brace and finds relief of ulnar sided wrist pain with this.       Objective: See treatment diary below        Assessment:   Patient purchased bullseye brace- fit and application reviewed.   She did wear the brace for heavier strengthening.  Added in pinch strengthening and progressed  strengthening.  Decreased fatigue with KB carry with less breaks needed.   ROM is status quo.  Educated patient in practice with putty and plastic fork/knife to work on cutting at home. She can also work on pinching and pulling to strengthen the fingers.  She was doing her home program 3x/day so we discussed reducing to 1x/day due to increased focus on strengthening. She should continue to stretch multiple times throughout the day.   Patient would benefit from continued PT to address left wrist mobility and strength impairments to facilitate return to pre-injury level of functioning.       Plan: Continue per plan of care.  Progress treatment as tolerated.       Precautions: fracture  DOI- 24- L DRF (conservative management)  Starting weaning from brace 10/18  MD orders: ROM, strengthening    HEP: TGE's, wrist/FA AROM, wrist AAROM with ball, flexor/extensor stretches  Manuals 10/10 10/15 10/18 10/23 10/25 10/30 11/1 11/13 11/15 11/20   L wrist retromassage x5' x5' x5' x5' x5'        PROM/AAROM L wrist x5' x10' x10' x10' x10' x10' x10' x10' x10' x10'  "                                                      Neuro Re-Ed                                                                                                        Ther Ex             Towel bunches    x3' x3' Plate x3' Plate x3' np     Pegs grasping nv X3' x3' x3' Putty press/pull pink x30 Putty press/pull pink x30 Putty press/ pull pink x30 Putty press/pull pink x30 Putty press/pull pink x30 Hand helper  1 blue 3\"x30    Ball rolls for wrist nv x3' x3' x3' X3' x3' x3' x3' x3' x3'   Wrist AROM Cone nv Cone x30 ea Cone x30 Cone x30 ea 1# x20 ea 1# x30 ea 2# x15 ea 2# x30 ea 2# x30 ea 3# x20 ea   FA AROM Cone nv Cone x3' Cone x3' Cone x3' Cone x3' Cone x3' Hammer high hold x20 Hammer mid hold x30 Hammer mid hold x30 Hammer mid hold x30   Wrist maze nv x3' 3' x3' x3' x3' x3' x3' x3' x3'   Finger web nv Ylw x30 Ylw x30 Red x30 Red x30 Green x30 Green x30 Blue x30 Blue x30 Blue x30   Flex bar      Ylw x20 ea Ylw x20 ea Ylw x30 ea Ylw x30 ea Red x20 ea   clothespins          Green/blue x3'   KB carry with towel          YKB x3' YKB x3'                                                                                 Ther Activity                                       Gait Training                                       Modalities             MHP L wrist pre exercise nv x10' x10' x10' x10' x10' x10' x10' x10' x10'                     "

## 2024-11-22 ENCOUNTER — EVALUATION (OUTPATIENT)
Dept: PHYSICAL THERAPY | Facility: MEDICAL CENTER | Age: 64
End: 2024-11-22
Payer: COMMERCIAL

## 2024-11-22 DIAGNOSIS — S52.572D OTHER CLOSED INTRA-ARTICULAR FRACTURE OF DISTAL END OF LEFT RADIUS WITH ROUTINE HEALING, SUBSEQUENT ENCOUNTER: Primary | ICD-10-CM

## 2024-11-22 PROCEDURE — 97110 THERAPEUTIC EXERCISES: CPT | Performed by: PHYSICAL THERAPIST

## 2024-11-22 PROCEDURE — 97140 MANUAL THERAPY 1/> REGIONS: CPT | Performed by: PHYSICAL THERAPIST

## 2024-11-22 NOTE — PROGRESS NOTES
Progress Note     Today's date: 2024  Patient name: Vernell Prescott  : 1960  MRN: [id number]  Referring provider: Wolf Sebastian DO  Dx:   Encounter Diagnosis     ICD-10-CM    1. Other closed intra-articular fracture of distal end of left radius with routine healing, subsequent encounter  S52.572D                        Subjective: Patient states her wrist has been feeling good. She has been using the bullseye brace with activity as needed. She did wake up once last night with pain. Still having difficulty with  and lifting with the left wrist.       Objective: See treatment diary below    AROM/PROM left wrist:  Flexion  = 50/60 degrees  Extension  = 60/70 degrees    Full active composite fist    MMT left wrist:  Flexion 4/5  Extension 4/5      R = 45# (RHD)  L = 20#    Outcome measures: FOTO = 56 (improved from 51 at last assessment)    Assessment: Patient has been compliant with PT attendance and HEP performance s/p L DRF DOI 24 being managed conservatively. Patient has improved ROM and strength which has resulted in improved functional use with ADLs/IADLs. ROM is functional. Patient continues to present with decreased wrist and  strength which limits her ability to perform heavy housekeeping, putting sheets on her bed, and lifting. Patient would benefit from continued PT to address left wrist mobility and strength impairments to facilitate return to pre-injury level of functioning.     Goals  1. Patient will be independent in individualized HEP to maximize restoration of wrist mobility- on going  2. Patient will achieve full active composite fist to assist with functional grasp.-- met  3. Patient will improve left wrist AROM by 15-20 degrees to improve tolerance to ADL/IADL performance- met  4. Patient will be able to DC brace in 2 weeks- met  5. Patient will be able to progress to strengthening. - met  6. Patient will achieve functional  strength compared to the contralateral side to  "assist with lifting/carrying.- in progress  7. Patient will achieve score on FOTO by MDC.- in progress      Plan: Continue PT 2x/week for 5 weeks for progressive strengthening.  POC end date: 12/27/25     Precautions: fracture  DOI- 9/5/24- L DRF (conservative management)  Starting weaning from brace 10/18  MD orders: ROM, strengthening    HEP: TGE's, wrist/FA AROM, wrist AAROM with ball, flexor/extensor stretches  Manuals 10/18 10/23 10/25 10/30 11/1 11/13 11/15 11/20 11/22   L wrist retromassage x5' x5' x5'         PROM/AAROM L wrist x10' x10' x10' x10' x10' x10' x10' x10' x10'                                                   Neuro Re-Ed                                                                                                Ther Ex            Towel bunches  x3' x3' Plate x3' Plate x3' np      Pegs grasping x3' x3' Putty press/pull pink x30 Putty press/pull pink x30 Putty press/ pull pink x30 Putty press/pull pink x30 Putty press/pull pink x30 Hand helper  1 blue 3\"x30  Hand helper 1 blue 3\"x30   Ball rolls for wrist x3' x3' X3' x3' x3' x3' x3' x3' X3' orange   Wrist AROM Cone x30 Cone x30 ea 1# x20 ea 1# x30 ea 2# x15 ea 2# x30 ea 2# x30 ea 3# x20 ea 3# x20 ea   FA AROM Cone x3' Cone x3' Cone x3' Cone x3' Hammer high hold x20 Hammer mid hold x30 Hammer mid hold x30 Hammer mid hold x30 Hammer mid hold x30   Wrist maze 3' x3' x3' x3' x3' x3' x3' x3' x3'   Finger web Ylw x30 Red x30 Red x30 Green x30 Green x30 Blue x30 Blue x30 Blue x30 Blue x30   Flex bar    Ylw x20 ea Ylw x20 ea Ylw x30 ea Ylw x30 ea Red x20 ea Red x20 ea   clothespins        Green/blue x3' Green/blue x3'   KB carry with towel        YKB x3' YKB x3' YKB x3'                                                                           Ther Activity                                    Gait Training                                    Modalities            MHP L wrist pre exercise x10' x10' x10' x10' x10' x10' x10' x10'                     "

## 2024-11-26 ENCOUNTER — OFFICE VISIT (OUTPATIENT)
Dept: NEUROLOGY | Facility: CLINIC | Age: 64
End: 2024-11-26
Payer: COMMERCIAL

## 2024-11-26 VITALS
WEIGHT: 175 LBS | DIASTOLIC BLOOD PRESSURE: 70 MMHG | BODY MASS INDEX: 28.12 KG/M2 | HEART RATE: 82 BPM | OXYGEN SATURATION: 98 % | SYSTOLIC BLOOD PRESSURE: 134 MMHG | HEIGHT: 66 IN

## 2024-11-26 DIAGNOSIS — Q04.3 POLYMICROGYRIA (HCC): ICD-10-CM

## 2024-11-26 DIAGNOSIS — G40.209 LOCALIZATION-RELATED FOCAL EPILEPSY WITH COMPLEX PARTIAL SEIZURES (HCC): ICD-10-CM

## 2024-11-26 PROCEDURE — 99214 OFFICE O/P EST MOD 30 MIN: CPT | Performed by: PSYCHIATRY & NEUROLOGY

## 2024-11-26 RX ORDER — BRIVARACETAM 25 MG/1
TABLET, FILM COATED ORAL
Qty: 180 TABLET | Refills: 1 | Status: SHIPPED | OUTPATIENT
Start: 2024-11-26

## 2024-11-26 RX ORDER — ZONISAMIDE 100 MG/1
CAPSULE ORAL
Qty: 270 CAPSULE | Refills: 3 | Status: SHIPPED | OUTPATIENT
Start: 2024-11-26

## 2024-11-26 RX ORDER — BRIVARACETAM 100 MG/1
TABLET, FILM COATED ORAL
Qty: 180 TABLET | Refills: 1 | Status: SHIPPED | OUTPATIENT
Start: 2024-11-26

## 2024-11-26 RX ORDER — LACOSAMIDE 100 MG/1
TABLET ORAL
Qty: 270 TABLET | Refills: 1 | Status: SHIPPED | OUTPATIENT
Start: 2024-11-26

## 2024-11-26 RX ORDER — LAMOTRIGINE 200 MG/1
TABLET ORAL
Qty: 180 TABLET | Refills: 3 | Status: SHIPPED | OUTPATIENT
Start: 2024-11-26

## 2024-11-26 RX ORDER — ALENDRONATE SODIUM 70 MG/1
70 TABLET ORAL WEEKLY
COMMUNITY
Start: 2024-11-14

## 2024-11-26 NOTE — PATIENT INSTRUCTIONS
-- Please increase your Brivaracetam to be 125 mg twice a day    -- continue your Lacosamide, Lamotrigine, and Zonisamide unchanged.
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
## 2024-11-27 ENCOUNTER — OFFICE VISIT (OUTPATIENT)
Dept: PHYSICAL THERAPY | Facility: MEDICAL CENTER | Age: 64
End: 2024-11-27
Payer: COMMERCIAL

## 2024-11-27 DIAGNOSIS — S52.572D OTHER CLOSED INTRA-ARTICULAR FRACTURE OF DISTAL END OF LEFT RADIUS WITH ROUTINE HEALING, SUBSEQUENT ENCOUNTER: Primary | ICD-10-CM

## 2024-11-27 PROCEDURE — 97110 THERAPEUTIC EXERCISES: CPT

## 2024-11-27 PROCEDURE — 97010 HOT OR COLD PACKS THERAPY: CPT

## 2024-11-27 PROCEDURE — 97140 MANUAL THERAPY 1/> REGIONS: CPT

## 2024-11-27 NOTE — PROGRESS NOTES
"Daily Note     Today's date: 2024  Patient name: Vernell Prescott  : 1960  MRN: [id number]  Referring provider: Wolf Sebastian DO  Dx:   Encounter Diagnosis     ICD-10-CM    1. Other closed intra-articular fracture of distal end of left radius with routine healing, subsequent encounter  S52.572D           Start Time: 1535  Stop Time: 1625  Total time in clinic (min): 50 minutes    Subjective: Pt states heard a pop while doing stretches this morning.       Objective: See treatment diary below      Assessment: After assessing the dorsal wrist no apparent disruption to tissues and pain was not present during palation or mobilization and when performing the same task here in the clinic. Tolerated treatment well. Slight discomfort with AROM with 3#, removed that weight and performed Hammer exercises and then returned to exercise at patient request and had no more discomfort with AROM. Pt then completed all exercises with no increased pain and was educated on thumb placement for wrist flexion to the mid carpal row. Patient would benefit from continued PT      Plan: Progress treatment as tolerated.       Precautions: fracture  DOI- 24- L DRF (conservative management)  Starting weaning from brace 10/18  MD orders: ROM, strengthening    HEP: TGE's, wrist/FA AROM, wrist AAROM with ball, flexor/extensor stretches  Manuals 10/18 10/23 10/25 10/30 11/1 11/13 11/15 11/20 11/22 11/27   L wrist retromassage x5' x5' x5'          PROM/AAROM L wrist x10' x10' x10' x10' x10' x10' x10' x10' x10' x10'                                                       Neuro Re-Ed                                                                                                        Ther Ex             Towel bunches  x3' x3' Plate x3' Plate x3' np       Pegs grasping x3' x3' Putty press/pull pink x30 Putty press/pull pink x30 Putty press/ pull pink x30 Putty press/pull pink x30 Putty press/pull pink x30 Hand helper  1 blue 3\"x30  Hand " "helper 1 blue 3\"x30 Hand [place] 1 blue 1 red 3\"x30   Ball rolls for wrist x3' x3' X3' x3' x3' x3' x3' x3' X3' orange X3' orange   Wrist AROM Cone x30 Cone x30 ea 1# x20 ea 1# x30 ea 2# x15 ea 2# x30 ea 2# x30 ea 3# x20 ea 3# x20 ea 3#x20 ea   FA AROM Cone x3' Cone x3' Cone x3' Cone x3' Hammer high hold x20 Hammer mid hold x30 Hammer mid hold x30 Hammer mid hold x30 Hammer mid hold x30 Hammer mid hold x30   Wrist maze 3' x3' x3' x3' x3' x3' x3' x3' x3' 3'   Finger web Ylw x30 Red x30 Red x30 Green x30 Green x30 Blue x30 Blue x30 Blue x30 Blue x30 Blue x30   Flex bar    Ylw x20 ea Ylw x20 ea Ylw x30 ea Ylw x30 ea Red x20 ea Red x20 ea Red x20 ea   clothespins        Green/blue x3' Green/blue x3' Green/Blue around wheel x3'   KB carry with towel        YKB x3' YKB x3' YKB x3' RKB x3'                                                                                 Ther Activity                                       Gait Training                                       Modalities             MHP L wrist pre exercise x10' x10' x10' x10' x10' x10' x10' x10'  10'                       "

## 2024-11-29 ENCOUNTER — OFFICE VISIT (OUTPATIENT)
Dept: PHYSICAL THERAPY | Facility: MEDICAL CENTER | Age: 64
End: 2024-11-29
Payer: COMMERCIAL

## 2024-11-29 DIAGNOSIS — S52.572D OTHER CLOSED INTRA-ARTICULAR FRACTURE OF DISTAL END OF LEFT RADIUS WITH ROUTINE HEALING, SUBSEQUENT ENCOUNTER: Primary | ICD-10-CM

## 2024-11-29 PROCEDURE — 97140 MANUAL THERAPY 1/> REGIONS: CPT | Performed by: PHYSICAL THERAPIST

## 2024-11-29 PROCEDURE — 97110 THERAPEUTIC EXERCISES: CPT | Performed by: PHYSICAL THERAPIST

## 2024-11-29 NOTE — PROGRESS NOTES
"Daily Note     Today's date: 2024  Patient name: Vernell Prescott  : 1960  MRN: [id number]  Referring provider: Wolf Sebastian DO  Dx:   Encounter Diagnosis     ICD-10-CM    1. Other closed intra-articular fracture of distal end of left radius with routine healing, subsequent encounter  S52.572D                      Subjective: Pt states she is doing well, making good progress       Objective: See treatment diary below      Assessment  Vernell with good tolerance to outlined TE, progress as able       Plan: Progress treatment as tolerated.       Precautions: fracture  DOI- 24- L DRF (conservative management)  Starting weaning from brace 10/18  MD orders: ROM, strengthening    HEP: TGE's, wrist/FA AROM, wrist AAROM with ball, flexor/extensor stretches  Manuals 10/18 10/23 10/25 10/30 11/1 11/13 11/15 11/20 11/22 11/29   L wrist retromassage x5' x5' x5'          PROM/AAROM L wrist x10' x10' x10' x10' x10' x10' x10' x10' x10' x10'                                                       Neuro Re-Ed                                                                                                        Ther Ex             Towel bunches  x3' x3' Plate x3' Plate x3' np       Pegs grasping x3' x3' Putty press/pull pink x30 Putty press/pull pink x30 Putty press/ pull pink x30 Putty press/pull pink x30 Putty press/pull pink x30 Hand helper  1 blue 3\"x30  Hand helper 1 blue 3\"x30 Hand [place] 1 blue 1 red 3\"x30   Ball rolls for wrist x3' x3' X3' x3' x3' x3' x3' x3' X3' orange X3' orange   Wrist AROM Cone x30 Cone x30 ea 1# x20 ea 1# x30 ea 2# x15 ea 2# x30 ea 2# x30 ea 3# x20 ea 3# x20 ea 3#x20 ea   FA AROM Cone x3' Cone x3' Cone x3' Cone x3' Hammer high hold x20 Hammer mid hold x30 Hammer mid hold x30 Hammer mid hold x30 Hammer mid hold x30 Hammer mid hold x30   Wrist maze 3' x3' x3' x3' x3' x3' x3' x3' x3' 3'   Finger web Ylw x30 Red x30 Red x30 Green x30 Green x30 Blue x30 Blue x30 Blue x30 Blue x30 Blue x30   Flex " bar    Ylw x20 ea Ylw x20 ea Ylw x30 ea Ylw x30 ea Red x20 ea Red x20 ea Red x20 ea   clothespins        Green/blue x3' Green/blue x3' Green/Blue around wheel x3'   KB carry with towel        YKB x3' YKB x3' YKB x3' RKB x3'                                                                                 Ther Activity                                       Gait Training                                       Modalities             MHP L wrist pre exercise x10' x10' x10' x10' x10' x10' x10' x10'  10'

## 2024-11-30 NOTE — ASSESSMENT & PLAN NOTE
Overall, she has not had any definite focal impaired awareness seizures, but has still been having some focal aware seizures, as described below.  Will be important for us to adjust her medications to try to eliminate the seizures altogether or at the very least avoid focal impaired awareness seizures.    --She will continue lacosamide, zonisamide, and lamotrigine unchanged.  I will have her increase her brivaracetam slightly to be 125 mg twice a day.  We could potentially increase this a little bit further if she would continue to have seizures

## 2024-12-04 ENCOUNTER — OFFICE VISIT (OUTPATIENT)
Dept: PHYSICAL THERAPY | Facility: MEDICAL CENTER | Age: 64
End: 2024-12-04
Payer: COMMERCIAL

## 2024-12-04 DIAGNOSIS — S52.572D OTHER CLOSED INTRA-ARTICULAR FRACTURE OF DISTAL END OF LEFT RADIUS WITH ROUTINE HEALING, SUBSEQUENT ENCOUNTER: Primary | ICD-10-CM

## 2024-12-04 PROCEDURE — 97140 MANUAL THERAPY 1/> REGIONS: CPT

## 2024-12-04 PROCEDURE — 97110 THERAPEUTIC EXERCISES: CPT

## 2024-12-04 NOTE — PROGRESS NOTES
"Daily Note     Today's date: 2024  Patient name: Vernell Prescott  : 1960  MRN: [id number]  Referring provider: Wolf Sebastian DO  Dx:   Encounter Diagnosis     ICD-10-CM    1. Other closed intra-articular fracture of distal end of left radius with routine healing, subsequent encounter  S52.572D             Start Time: 1048  Stop Time: 1138  Total time in clinic (min): 50 minutes    Subjective: Pt states her wrist has been really good today. She notes she was able to  a 2 liter bottle of soda. He reports she was carrying groceries yesterday. She was cautious but no pain present.         Objective: See treatment diary below      Assessment  Continued per plan of care with manuals and Therex. Patient is appropriately challenged. Fatigue is noted toward the end of set time for kb carries and clothespins. Good status noted post session. Patient will continue to benefit from skilled physical therapy.         Vernell with good tolerance to outlined TE, progress as able       Plan: Progress treatment as tolerated.       Precautions: fracture  DOI- 24- L DRF (conservative management)  Starting weaning from brace 10/18  MD orders: ROM, strengthening    HEP: TGE's, wrist/FA AROM, wrist AAROM with ball, flexor/extensor stretches  Manuals 10/18 10/23 10/25 10/30 11/1 11/13 11/15 11/20 11/22 11/29 12/4   L wrist retromassage x5' x5' x5'           PROM/AAROM L wrist x10' x10' x10' x10' x10' x10' x10' x10' x10' x10' X10'                                                            Neuro Re-Ed                                                                                                                Ther Ex              Towel bunches  x3' x3' Plate x3' Plate x3' np        Pegs grasping x3' x3' Putty press/pull pink x30 Putty press/pull pink x30 Putty press/ pull pink x30 Putty press/pull pink x30 Putty press/pull pink x30 Hand helper  1 blue 3\"x30  Hand helper 1 blue 3\"x30 Hand [place] 1 blue 1 red 3\"x30 Hand " Detail Level: Simple "[place] 1 blue 1 red 3\"x30   Ball rolls for wrist x3' x3' X3' x3' x3' x3' x3' x3' X3' orange X3' orange X3' orange   Wrist AROM Cone x30 Cone x30 ea 1# x20 ea 1# x30 ea 2# x15 ea 2# x30 ea 2# x30 ea 3# x20 ea 3# x20 ea 3#x20 ea 3#x20 ea   FA AROM Cone x3' Cone x3' Cone x3' Cone x3' Hammer high hold x20 Hammer mid hold x30 Hammer mid hold x30 Hammer mid hold x30 Hammer mid hold x30 Hammer mid hold x30 Hammer mid hold x30   Wrist maze 3' x3' x3' x3' x3' x3' x3' x3' x3' 3' 3'    Finger web Ylw x30 Red x30 Red x30 Green x30 Green x30 Blue x30 Blue x30 Blue x30 Blue x30 Blue x30 Blue x30   Flex bar    Ylw x20 ea Ylw x20 ea Ylw x30 ea Ylw x30 ea Red x20 ea Red x20 ea Red x20 ea Red x20 ea   clothespins        Green/blue x3' Green/blue x3' Green/Blue around wheel x3' Green/Blue around wheel x3'   KB carry with towel        YKB x3' YKB x3' YKB x3' RKB x3' RKB x3'                                                                                       Ther Activity                                          Gait Training                                          Modalities              MHP L wrist pre exercise x10' x10' x10' x10' x10' x10' x10' x10'  10' 10'                         " Detail Level: Generalized Detail Level: Detailed Detail Level: Zone Patient Specific Counseling (Will Not Stick From Patient To Patient): 2x2 cm sx with Dr Aparicio

## 2024-12-06 ENCOUNTER — OFFICE VISIT (OUTPATIENT)
Dept: PHYSICAL THERAPY | Facility: MEDICAL CENTER | Age: 64
End: 2024-12-06
Payer: COMMERCIAL

## 2024-12-06 DIAGNOSIS — S52.572D OTHER CLOSED INTRA-ARTICULAR FRACTURE OF DISTAL END OF LEFT RADIUS WITH ROUTINE HEALING, SUBSEQUENT ENCOUNTER: Primary | ICD-10-CM

## 2024-12-06 PROCEDURE — 97010 HOT OR COLD PACKS THERAPY: CPT

## 2024-12-06 PROCEDURE — 97110 THERAPEUTIC EXERCISES: CPT

## 2024-12-06 NOTE — PROGRESS NOTES
"Daily Note     Today's date: 2024  Patient name: Vernell Prescott  : 1960  MRN: [id number]  Referring provider: Wolf Sebastian DO  Dx:   Encounter Diagnosis     ICD-10-CM    1. Other closed intra-articular fracture of distal end of left radius with routine healing, subsequent encounter  S52.572D             Start Time: 0900  Stop Time: 0945  Total time in clinic (min): 45 minutes    Subjective: Pt states her wrist was a little sore last week after she left but she felt good overall. Was shopping and was holding clothes over her arms with no issue.         Objective: See treatment diary below      Assessment: Pt tolerated session well. Appropriate fatigue noted by the end of session. No pain throughout, clothespins remain most challenging. Pt requesting measurements to be performed next session to assess progress.       Vernell with good tolerance to outlined TE, progress as able       Plan: Progress treatment as tolerated.       Precautions: fracture  DOI- 24- L DRF (conservative management)  Starting weaning from brace 10/18  MD orders: ROM, strengthening    HEP: TGE's, wrist/FA AROM, wrist AAROM with ball, flexor/extensor stretches  Manuals 12/6  10/25 10/30 11/1 11/13 11/15 11/20 11/22 11/29 12/4   L wrist retromassage   x5'           PROM/AAROM L wrist   x10' x10' x10' x10' x10' x10' x10' x10' X10'                                                            Neuro Re-Ed                                                                                                                Ther Ex              Towel bunches   x3' Plate x3' Plate x3' np        Pegs grasping Hand [place] 1 blue 1 red 3\"x30  Putty press/pull pink x30 Putty press/pull pink x30 Putty press/ pull pink x30 Putty press/pull pink x30 Putty press/pull pink x30 Hand helper  1 blue 3\"x30  Hand helper 1 blue 3\"x30 Hand [place] 1 blue 1 red 3\"x30 Hand [place] 1 blue 1 red 3\"x30   Ball rolls for wrist X3' orange  X3' x3' x3' x3' x3' x3' X3' orange " X3' orange X3' orange   Wrist AROM 3#x20 ea  1# x20 ea 1# x30 ea 2# x15 ea 2# x30 ea 2# x30 ea 3# x20 ea 3# x20 ea 3#x20 ea 3#x20 ea   FA AROM Hammer mid hold x30  Cone x3' Cone x3' Hammer high hold x20 Hammer mid hold x30 Hammer mid hold x30 Hammer mid hold x30 Hammer mid hold x30 Hammer mid hold x30 Hammer mid hold x30   Wrist maze   x3' x3' x3' x3' x3' x3' x3' 3' 3'    Finger web Blue x30  Red x30 Green x30 Green x30 Blue x30 Blue x30 Blue x30 Blue x30 Blue x30 Blue x30   Flex bar Red x20 ea   Ylw x20 ea Ylw x20 ea Ylw x30 ea Ylw x30 ea Red x20 ea Red x20 ea Red x20 ea Red x20 ea   clothespins        Green/blue x3' Green/blue x3' Green/Blue around wheel x3' Green/Blue around wheel x3'   KB carry with towel        YKB x3' YKB x3' YKB x3' RKB x3' RKB x3'                                                                                       Ther Activity                                          Gait Training                                          Modalities              MHP L wrist pre exercise 10'  x10' x10' x10' x10' x10' x10'  10' 10'

## 2024-12-11 ENCOUNTER — OFFICE VISIT (OUTPATIENT)
Dept: PHYSICAL THERAPY | Facility: MEDICAL CENTER | Age: 64
End: 2024-12-11
Payer: COMMERCIAL

## 2024-12-11 DIAGNOSIS — S52.572D OTHER CLOSED INTRA-ARTICULAR FRACTURE OF DISTAL END OF LEFT RADIUS WITH ROUTINE HEALING, SUBSEQUENT ENCOUNTER: Primary | ICD-10-CM

## 2024-12-11 PROCEDURE — 97140 MANUAL THERAPY 1/> REGIONS: CPT

## 2024-12-11 PROCEDURE — 97110 THERAPEUTIC EXERCISES: CPT

## 2024-12-11 NOTE — PROGRESS NOTES
"Daily Note     Today's date: 2024  Patient name: Vernell Prescott  : 1960  MRN: [id number]  Referring provider: Wolf Sebastian DO  Dx:   Encounter Diagnosis     ICD-10-CM    1. Other closed intra-articular fracture of distal end of left radius with routine healing, subsequent encounter  S52.572D               Start Time: 1100  Stop Time: 1138  Total time in clinic (min): 38 minutes    Subjective: Pt states her wrist has been \"great\". She denies any pain or complaints today.      Objective: See treatment diary below      Assessment: Pt tolerated session well. Continued per Pt plan of care. Progress hold times of hand helper for strengthening and endurance. Patient is appropriately challenged with exercises and tolerated them well. Fatigue noted without pain increase. Patient will continue to benefit from skilled physical therapy in order to maximize function.         Plan: Progress treatment as tolerated.       Precautions: fracture  DOI- 24- L DRF (conservative management)  Starting weaning from brace 10/18  MD orders: ROM, strengthening    HEP: TGE's, wrist/FA AROM, wrist AAROM with ball, flexor/extensor stretches  Manuals 12/6  10/25 10/30 11/1 11/13 11/15 11/20 11/22 11/29 12/4 12/11   L wrist retromassage   x5'            PROM/AAROM L wrist   x10' x10' x10' x10' x10' x10' x10' x10' X10'  X10'                                                                Neuro Re-Ed                                                                                                                        Ther Ex               Towel bunches   x3' Plate x3' Plate x3' np         Pegs grasping Hand [place] 1 blue 1 red 3\"x30  Putty press/pull pink x30 Putty press/pull pink x30 Putty press/ pull pink x30 Putty press/pull pink x30 Putty press/pull pink x30 Hand helper  1 blue 3\"x30  Hand helper 1 blue 3\"x30 Hand [place] 1 blue 1 red 3\"x30 Hand [place] 1 blue 1 red 3\"x30 Hand [place] 1 blue 1 red 5\"x30   Ball rolls for wrist X3' " orange  X3' x3' x3' x3' x3' x3' X3' orange X3' orange X3' orange X3' orange   Wrist AROM 3#x20 ea  1# x20 ea 1# x30 ea 2# x15 ea 2# x30 ea 2# x30 ea 3# x20 ea 3# x20 ea 3#x20 ea 3#x20 ea 3#x20 ea   FA AROM Hammer mid hold x30  Cone x3' Cone x3' Hammer high hold x20 Hammer mid hold x30 Hammer mid hold x30 Hammer mid hold x30 Hammer mid hold x30 Hammer mid hold x30 Hammer mid hold x30 Hammer mid hold x30   Wrist maze   x3' x3' x3' x3' x3' x3' x3' 3' 3'  3'   Finger web Blue x30  Red x30 Green x30 Green x30 Blue x30 Blue x30 Blue x30 Blue x30 Blue x30 Blue x30 Blue x30   Flex bar Red x20 ea   Ylw x20 ea Ylw x20 ea Ylw x30 ea Ylw x30 ea Red x20 ea Red x20 ea Red x20 ea Red x20 ea Red x20 ea   clothespins        Green/blue x3' Green/blue x3' Green/Blue around wheel x3' Green/Blue around wheel x3' Green/Blue around wheel x3'   KB carry with towel        YKB x3' YKB x3' YKB x3' RKB x3' RKB x3' Cont next session                                                                                              Ther Activity                                             Gait Training                                             Modalities               MHP L wrist pre exercise 10'  x10' x10' x10' x10' x10' x10'  10' 10'

## 2024-12-13 ENCOUNTER — OFFICE VISIT (OUTPATIENT)
Dept: PHYSICAL THERAPY | Facility: MEDICAL CENTER | Age: 64
End: 2024-12-13
Payer: COMMERCIAL

## 2024-12-13 DIAGNOSIS — S52.572D OTHER CLOSED INTRA-ARTICULAR FRACTURE OF DISTAL END OF LEFT RADIUS WITH ROUTINE HEALING, SUBSEQUENT ENCOUNTER: Primary | ICD-10-CM

## 2024-12-13 PROCEDURE — 97140 MANUAL THERAPY 1/> REGIONS: CPT

## 2024-12-13 PROCEDURE — 97110 THERAPEUTIC EXERCISES: CPT

## 2024-12-13 NOTE — PROGRESS NOTES
"Daily Note     Today's date: 2024  Patient name: Vernell Prescott  : 1960  MRN: [id number]  Referring provider: Wolf Sebastian DO  Dx:   Encounter Diagnosis     ICD-10-CM    1. Other closed intra-articular fracture of distal end of left radius with routine healing, subsequent encounter  S52.572D                 Start Time: 1100  Stop Time: 1139  Total time in clinic (min): 39 minutes    Subjective: Pt offers no complaints or updates at the start of the session today.        Objective: See treatment diary below      Assessment: Pt tolerated session well. Patient is appropriately challenged with today's exercises. Continued per exercise program as charted below with focus on rom and strengthening. Muscular fatigue is noted toward the end of the session and with kb carry.  Patient will continue to benefit from skilled physical therapy in order to maximize function.         Plan: Progress treatment as tolerated.       Precautions: fracture  DOI- 24- L DRF (conservative management)  Starting weaning from brace 10/18  MD orders: ROM, strengthening    HEP: TGE's, wrist/FA AROM, wrist AAROM with ball, flexor/extensor stretches  Manuals 12/13   10/30 11/1 11/13 11/15 11/20 11/22 11/29 12/4 12/11   L wrist retromassage               PROM/AAROM L wrist X10'    x10' x10' x10' x10' x10' x10' x10' X10'  X10'                                                                Neuro Re-Ed                                                                                                                        Ther Ex               Towel bunches    Plate x3' Plate x3' np         Pegs grasping Hand [place] 1 blue 1 red 5\"x30   Putty press/pull pink x30 Putty press/ pull pink x30 Putty press/pull pink x30 Putty press/pull pink x30 Hand helper  1 blue 3\"x30  Hand helper 1 blue 3\"x30 Hand [place] 1 blue 1 red 3\"x30 Hand [place] 1 blue 1 red 3\"x30 Hand [place] 1 blue 1 red 5\"x30   Ball rolls for wrist X3' orange   x3' x3' x3' x3' x3' " X3' orange X3' orange X3' orange X3' orange   Wrist AROM 3#x20 ea   1# x30 ea 2# x15 ea 2# x30 ea 2# x30 ea 3# x20 ea 3# x20 ea 3#x20 ea 3#x20 ea 3#x20 ea   FA AROM Hammer mid hold x30   [place] x3' Hammer high hold x20 Hammer mid hold x30 Hammer mid hold x30 Hammer mid hold x30 Hammer mid hold x30 Hammer mid hold x30 Hammer mid hold x30 Hammer mid hold x30   Wrist maze 3'   x3' x3' x3' x3' x3' x3' 3' 3'  3'   Finger web Blue x30   Green x30 Green x30 Blue x30 Blue x30 Blue x30 Blue x30 Blue x30 Blue x30 Blue x30   Flex bar Red x20 ea   Ylw x20 ea Ylw x20 ea Ylw x30 ea Ylw x30 ea Red x20 ea Red x20 ea Red x20 ea Red x20 ea Red x20 ea   clothespins Green/Blue around wheel x3'       Green/blue x3' Green/blue x3' Green/Blue around wheel x3' Green/Blue around wheel x3' Green/Blue around wheel x3'   KB carry with towel  RKB x3'      YKB x3' YKB x3' YKB x3' RKB x3' RKB x3' Cont next session                                                                                              Ther Activity                                             Gait Training                                             Modalities               MHP L wrist pre exercise Pt defers    x10' x10' x10' x10' x10'  10' 10'

## 2024-12-17 ENCOUNTER — OFFICE VISIT (OUTPATIENT)
Dept: PHYSICAL THERAPY | Facility: MEDICAL CENTER | Age: 64
End: 2024-12-17
Payer: COMMERCIAL

## 2024-12-17 DIAGNOSIS — S52.572D OTHER CLOSED INTRA-ARTICULAR FRACTURE OF DISTAL END OF LEFT RADIUS WITH ROUTINE HEALING, SUBSEQUENT ENCOUNTER: Primary | ICD-10-CM

## 2024-12-17 PROCEDURE — 97110 THERAPEUTIC EXERCISES: CPT

## 2024-12-17 PROCEDURE — 97140 MANUAL THERAPY 1/> REGIONS: CPT

## 2024-12-17 NOTE — PROGRESS NOTES
"Daily Note     Today's date: 2024  Patient name: Vernell Prescott  : 1960  MRN: [id number]  Referring provider: Wolf Sebastian DO  Dx:   Encounter Diagnosis     ICD-10-CM    1. Other closed intra-articular fracture of distal end of left radius with routine healing, subsequent encounter  S52.572D                   Start Time: 1130  Stop Time: 1210  Total time in clinic (min): 40 minutes    Subjective: Pt  reports no pain present today.       Objective: See treatment diary below      Assessment: Pt tolerated session well. Continued per PT plan of care with rom and strengthening. Patient has good form and performance with exercises.  Patient completes exercises without pain increases. Fatigue noted toward the end of there session. Patient will continue to benefit from skilled physical therapy in order to maximize function.   PT 1:1 entire session      Plan: Progress treatment as tolerated.       Precautions: fracture  DOI- 24- L DRF (conservative management)  Starting weaning from brace 10/18  MD orders: ROM, strengthening    HEP: TGE's, wrist/FA AROM, wrist AAROM with ball, flexor/extensor stretches  Manuals 12/13 12/17   11/1 11/13 11/15 11/20 11/22 11/29 12/4 12/11   L wrist retromassage               PROM/AAROM L wrist X10'  X10'    x10' x10' x10' x10' x10' x10' X10'  X10'                                                                Neuro Re-Ed                                                                                                                        Ther Ex               Towel bunches     Plate x3' np         Pegs grasping Hand [place] 1 blue 1 red 5\"x30 Hand [place] 1 blue 1 red 5\"x30   Putty press/ pull pink x30 Putty press/pull pink x30 Putty press/pull pink x30 Hand helper  1 blue 3\"x30  Hand helper 1 blue 3\"x30 Hand [place] 1 blue 1 red 3\"x30 Hand [place] 1 blue 1 red 3\"x30 Hand [place] 1 blue 1 red 5\"x30   Ball rolls for wrist X3' orange X3' orange   x3' x3' x3' x3' X3' orange X3' " orange X3' orange X3' orange   Wrist AROM 3#x20 ea 3#x20 ea   2# x15 ea 2# x30 ea 2# x30 ea 3# x20 ea 3# x20 ea 3#x20 ea 3#x20 ea 3#x20 ea   FA AROM Hammer mid hold x30 Hammer mid hold x30   Hammer high hold x20 Hammer mid hold x30 Hammer mid hold x30 Hammer mid hold x30 Hammer mid hold x30 Hammer mid hold x30 Hammer mid hold x30 Hammer mid hold x30   Wrist maze 3' 3'   x3' x3' x3' x3' x3' 3' 3'  3'   Finger web Blue x30 Blue x30   Green x30 Blue x30 Blue x30 Blue x30 Blue x30 Blue x30 Blue x30 Blue x30   Flex bar Red x20 ea Red x20 ea   Ylw x20 ea Ylw x30 ea Ylw x30 ea Red x20 ea Red x20 ea Red x20 ea Red x20 ea Red x20 ea   clothespins Green/Blue around wheel x3' Green/Blue around wheel x3'      Green/blue x3' Green/blue x3' Green/Blue around wheel x3' Green/Blue around wheel x3' Green/Blue around wheel x3'   KB carry with towel  RKB x3' RKB x3'     YKB x3' YKB x3' YKB x3' RKB x3' RKB x3' Cont next session                                                                                              Ther Activity                                             Gait Training                                             Modalities               MHP L wrist pre exercise Pt defers     x10' x10' x10' x10'  10' 10'

## 2024-12-20 ENCOUNTER — OFFICE VISIT (OUTPATIENT)
Dept: PHYSICAL THERAPY | Facility: MEDICAL CENTER | Age: 64
End: 2024-12-20
Payer: COMMERCIAL

## 2024-12-20 DIAGNOSIS — S52.572D OTHER CLOSED INTRA-ARTICULAR FRACTURE OF DISTAL END OF LEFT RADIUS WITH ROUTINE HEALING, SUBSEQUENT ENCOUNTER: Primary | ICD-10-CM

## 2024-12-20 PROCEDURE — 97110 THERAPEUTIC EXERCISES: CPT | Performed by: PHYSICAL THERAPIST

## 2024-12-20 PROCEDURE — 97140 MANUAL THERAPY 1/> REGIONS: CPT | Performed by: PHYSICAL THERAPIST

## 2024-12-20 NOTE — PROGRESS NOTES
"Daily Note     Today's date: 2024  Patient name: Vernell Prescott  : 1960  MRN: [id number]  Referring provider: Wolf Sebastian DO  Dx:   Encounter Diagnosis     ICD-10-CM    1. Other closed intra-articular fracture of distal end of left radius with routine healing, subsequent encounter  S52.572D                      Subjective: Pt reports she has been feeling really good in her wrist      Objective: See treatment diary below      Assessment: Tolerated treatment well. Patient exhibited good technique with therapeutic exercises and would benefit from continued PT  No pain with manuals or overpressure  Increased resistance to program, pt noted mild soreness post session  Added wrist and FA PRE's with TB for home demonstrated to Pt, pt showed understanding for each    Plan: Continue per plan of care.      Precautions: fracture  DOI- 24- L DRF (conservative management)  Starting weaning from brace 10/18  MD orders: ROM, strengthening    HEP: TGE's, wrist/FA AROM, wrist AAROM with ball, flexor/extensor stretches, TB wrist and FA PRE's  Manuals 12/13 12/17 12/20  11/1 11/13 11/15 11/20 11/22 11/29 12/4 12/11   L wrist retromassage               PROM/AAROM L wrist X10'  X10'  x10'  x10' x10' x10' x10' x10' x10' X10'  X10'                                                                Neuro Re-Ed                                                                                                                        Ther Ex               Towel bunches     Plate x3' np         Hand helper Hand Ball 1 blue 1 red 5\"x30 Hand Ball 1 blue 1 red 5\"x30 1 blue 1 green 30x  Putty press/ pull pink x30 Putty press/pull pink x30 Putty press/pull pink x30 Hand helper  1 blue 3\"x30  Hand helper 1 blue 3\"x30 Hand Ball 1 blue 1 red 3\"x30 Hand Ball 1 blue 1 red 3\"x30 Hand Ball 1 blue 1 red 5\"x30   Ball rolls for wrist X3' orange X3' orange 3 min orange  x3' x3' x3' x3' X3' orange X3' orange X3' orange X3' orange   Wrist " AROM 3#x20 ea 3#x20 ea 4# 2x10  2# x15 ea 2# x30 ea 2# x30 ea 3# x20 ea 3# x20 ea 3#x20 ea 3#x20 ea 3#x20 ea   FA AROM Hammer mid hold x30 Hammer mid hold x30 Hammer 30x  Hammer high hold x20 Hammer mid hold x30 Hammer mid hold x30 Hammer mid hold x30 Hammer mid hold x30 Hammer mid hold x30 Hammer mid hold x30 Hammer mid hold x30   Wrist maze 3' 3' 3'  x3' x3' x3' x3' x3' 3' 3'  3'   Finger web Blue x30 Blue x30 Blue 30x  Green x30 Blue x30 Blue x30 Blue x30 Blue x30 Blue x30 Blue x30 Blue x30   Flex bar Red x20 ea Red x20 ea Green 20x each  Ylw x20 ea Ylw x30 ea Ylw x30 ea Red x20 ea Red x20 ea Red x20 ea Red x20 ea Red x20 ea   clothespins Green/Blue around wheel x3' Green/Blue around wheel x3' Blue/black 3 min     Green/blue x3' Green/blue x3' Green/Blue around wheel x3' Green/Blue around wheel x3' Green/Blue around wheel x3'   KB carry with towel  RKB x3' RKB x3' RKBx3'    YKB x3' YKB x3' YKB x3' RKB x3' RKB x3' Cont next session                                                                                              Ther Activity                                             Gait Training                                             Modalities               MHP L wrist pre exercise Pt defers     x10' x10' x10' x10'  10' 10'

## 2024-12-24 ENCOUNTER — APPOINTMENT (OUTPATIENT)
Dept: PHYSICAL THERAPY | Facility: MEDICAL CENTER | Age: 64
End: 2024-12-24
Payer: COMMERCIAL

## 2024-12-27 ENCOUNTER — APPOINTMENT (OUTPATIENT)
Dept: PHYSICAL THERAPY | Facility: MEDICAL CENTER | Age: 64
End: 2024-12-27
Payer: COMMERCIAL

## 2025-01-26 DIAGNOSIS — G40.209 LOCALIZATION-RELATED FOCAL EPILEPSY WITH COMPLEX PARTIAL SEIZURES (HCC): ICD-10-CM

## 2025-01-28 RX ORDER — BRIVARACETAM 100 MG/1
TABLET, FILM COATED ORAL
Qty: 180 TABLET | Refills: 1 | Status: SHIPPED | OUTPATIENT
Start: 2025-01-28

## 2025-05-06 DIAGNOSIS — G40.209 LOCALIZATION-RELATED FOCAL EPILEPSY WITH COMPLEX PARTIAL SEIZURES (HCC): ICD-10-CM

## 2025-05-06 RX ORDER — BRIVARACETAM 25 MG/1
TABLET, FILM COATED ORAL
Qty: 180 TABLET | Refills: 1 | Status: SHIPPED | OUTPATIENT
Start: 2025-05-06

## 2025-05-06 NOTE — TELEPHONE ENCOUNTER
Medication: Briviact  PDMP  04/27/2025 04/27/2025 01/28/2025 Briviact (Tablet) 180.0 90 100 MG NA [employer] Commercial Insurance 0 / 1 [place]   1 [id number] 04/14/2025 04/14/2025 11/26/2024 Lacosamide (Tablet) 270.0 90 100 MG NA [employer] Commercial Insurance 1 / 1 [place]   1 [id number] 02/05/2025 02/05/2025 11/26/2024 Briviact (Tablet) 180.0 90 25 MG NA [employer] Commercial Insurance 1 / 1 [place]   1 [id number] 01/27/2025 01/27/2025 11/26/2024 Briviact (Tablet) 180.0 90 100 MG NA [employer] Commercial Insurance 0 / 1 [place]   1 [id number] 01/15/2025 01/15/2025 11/26/2024 Lacosamide (Tablet) 270.0 90 100 MG NA [employer] Commercial Insurance 0 / 1  Active agreement on file -No

## 2025-06-09 ENCOUNTER — OFFICE VISIT (OUTPATIENT)
Dept: NEUROLOGY | Facility: CLINIC | Age: 65
End: 2025-06-09
Payer: COMMERCIAL

## 2025-06-09 VITALS
SYSTOLIC BLOOD PRESSURE: 114 MMHG | HEIGHT: 66 IN | WEIGHT: 177 LBS | HEART RATE: 76 BPM | BODY MASS INDEX: 28.45 KG/M2 | OXYGEN SATURATION: 98 % | DIASTOLIC BLOOD PRESSURE: 80 MMHG

## 2025-06-09 DIAGNOSIS — G40.209 LOCALIZATION-RELATED FOCAL EPILEPSY WITH COMPLEX PARTIAL SEIZURES (HCC): ICD-10-CM

## 2025-06-09 DIAGNOSIS — Q04.3 POLYMICROGYRIA (HCC): ICD-10-CM

## 2025-06-09 PROCEDURE — 99214 OFFICE O/P EST MOD 30 MIN: CPT | Performed by: PSYCHIATRY & NEUROLOGY

## 2025-06-09 RX ORDER — BRIVARACETAM 100 MG/1
TABLET, FILM COATED ORAL
Qty: 180 TABLET | Refills: 1 | Status: SHIPPED | OUTPATIENT
Start: 2025-06-09

## 2025-06-09 RX ORDER — ZONISAMIDE 100 MG/1
CAPSULE ORAL
Qty: 270 CAPSULE | Refills: 3 | Status: SHIPPED | OUTPATIENT
Start: 2025-06-09

## 2025-06-09 RX ORDER — BRIVARACETAM 25 MG/1
TABLET, FILM COATED ORAL
Qty: 180 TABLET | Refills: 1 | Status: CANCELLED | OUTPATIENT
Start: 2025-06-09

## 2025-06-09 RX ORDER — LAMOTRIGINE 200 MG/1
TABLET ORAL
Qty: 180 TABLET | Refills: 3 | Status: SHIPPED | OUTPATIENT
Start: 2025-06-09

## 2025-06-09 RX ORDER — LACOSAMIDE 100 MG/1
TABLET ORAL
Qty: 270 TABLET | Refills: 1 | Status: SHIPPED | OUTPATIENT
Start: 2025-06-09

## 2025-06-09 NOTE — PATIENT INSTRUCTIONS
-- continue to take Lamotrigine, Lacosamide, and Zonisamide unchanged.     -- Please increase your Brivaracetam 150 mg twice a day. I sent a prescription for 50 mg tabs, so you will take one 100 mg tab and one 50 mg tab for the total dose of 150 mg twice a day.

## 2025-06-09 NOTE — PROGRESS NOTES
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 She was interested in adjusting her medications with a goal of becoming entirely seizure free. To this end, I will increase her dose of Brivaracetam. We discussed that 150 mg twice a day is a maximal dose of Brivaracetam, so if seizures would continue, we may need to change Zonisamide to a different medication.     -- she will continue Zonisamide, Lamotrigine, and Lacosamide unchanged for now.     -- she will increase Brivaracetam to be 150 mg twice a day.     -- as above, if she would continue to have seizures, I would likely plan to change Zonisamide to a different medication, such as clobazam or perampanel.   Orders:    Brivaracetam (Briviact) 100 MG TABS; Take 1 tab twice a day with one 50 mg tab for total dose of 150 mg twice a day.    lacosamide (VIMPAT) 100 mg tablet; TAKE 1 TABLET EVERY MORNING AND 2 TABLETS (200 MG) EVERY EVENING    lamoTRIgine (LaMICtal) 200 MG tablet; TAKE 1 TABLET TWICE A DAY    zonisamide (ZONEGRAN) 100 mg capsule; Taking 100mg in am and 200mg at night    brivaracetam (BRIVIACT) 50 MG TABS tablet; Take 1 tab by mouth, twice a day with one 100 mg tab for total dose of 150 mg twice a day.        She will Return in about 6 months (around 2025).      History of Present Illness   HPI  Vernell Prescott is a 64 y.o. 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     Current seizure medications:  1. Lacosamide 100 mg in the morning and 200 mg at night  2. Briviact 125 mg twice a day  3. Zonisamide 100 mg in the morning and 200 mg at night  4. Lamotrigine 200 mg twice a day   Other medications as per Epic.    Since her last visit, she had two focal aware seizures with an intense feeling of mitchell vu, each which lasted about 5 min. These continued to be with full awareness and ability to interact throughout both events.  She did note some increase in her stress, but otherwise no clear trigger. She has not had any other focal impaired aware seizures or focal to bilateral tonic clonic seizures.     She tolerated the increase of Brivaracetam well.     Prior Seizure Medications: Phentoin (changed due to potential long-term side effects), Levetiracetam (behavioral changes), Carbamazepine     Review of Systems  I personally reviewed the review of systems that was entered by the medical assistant in a separate note       Objective   /80 (BP Location: Left arm, Patient Position: Sitting, Cuff Size: Standard)   Pulse 76   Ht 5' 5.5\" (1.664 m)   Wt 80.3 kg (177 lb)   SpO2 98%   BMI 29.01 kg/m²      Physical Exam    Voice recognition software was used in the generation of this note. There may be unintentional errors including grammatical errors, spelling errors, or pronoun errors.    :    "

## 2025-06-09 NOTE — ASSESSMENT & PLAN NOTE
She has overall been doing well, but has had two focal aware seizures since her last visit. She was interested in adjusting her medications with a goal of becoming entirely seizure free. To this end, I will increase her dose of Brivaracetam. We discussed that 150 mg twice a day is a maximal dose of Brivaracetam, so if seizures would continue, we may need to change Zonisamide to a different medication.     -- she will continue Zonisamide, Lamotrigine, and Lacosamide unchanged for now.     -- she will increase Brivaracetam to be 150 mg twice a day.     -- as above, if she would continue to have seizures, I would likely plan to change Zonisamide to a different medication, such as clobazam or perampanel.   Orders:    Brivaracetam (Briviact) 100 MG TABS; Take 1 tab twice a day with one 50 mg tab for total dose of 150 mg twice a day.    lacosamide (VIMPAT) 100 mg tablet; TAKE 1 TABLET EVERY MORNING AND 2 TABLETS (200 MG) EVERY EVENING    lamoTRIgine (LaMICtal) 200 MG tablet; TAKE 1 TABLET TWICE A DAY    zonisamide (ZONEGRAN) 100 mg capsule; Taking 100mg in am and 200mg at night    brivaracetam (BRIVIACT) 50 MG TABS tablet; Take 1 tab by mouth, twice a day with one 100 mg tab for total dose of 150 mg twice a day.

## 2025-08-19 ENCOUNTER — TELEPHONE (OUTPATIENT)
Age: 65
End: 2025-08-19

## (undated) DEVICE — 3M™ DURAPORE™ SURGICAL TAPE 1538-1, 1 INCH X 10 YARD (2,5CM X 9,1M), 12 ROLLS/BOX: Brand: 3M™ DURAPORE™

## (undated) DEVICE — GLOVE SRG BIOGEL 7.5

## (undated) DEVICE — MEDI-VAC YANKAUER SUCTION HANDLE W/BULBOUS AND CONTROL VENT: Brand: CARDINAL HEALTH

## (undated) DEVICE — DRILL BIT, AO DIA2.6MM X 135MM, SCALED: Brand: VARIAX

## (undated) DEVICE — GLOVE INDICATOR PI UNDERGLOVE SZ 8 BLUE

## (undated) DEVICE — DISPOSABLE OR TOWEL: Brand: CARDINAL HEALTH

## (undated) DEVICE — INTENDED FOR TISSUE SEPARATION, AND OTHER PROCEDURES THAT REQUIRE A SHARP SURGICAL BLADE TO PUNCTURE OR CUT.: Brand: BARD-PARKER ® CARBON RIB-BACK BLADES

## (undated) DEVICE — CAST PADDING 6 IN STERILE

## (undated) DEVICE — BETHLEHEM UNIV MAJ EXT ,KIT: Brand: CARDINAL HEALTH

## (undated) DEVICE — SUT VICRYL 2-0 CT-2 27 IN J269H

## (undated) DEVICE — HOLDING PIN
Type: IMPLANTABLE DEVICE | Site: ANKLE | Status: NON-FUNCTIONAL
Brand: ANCHORAGE
Removed: 2022-05-23

## (undated) DEVICE — CAST PADDING 4 IN UNSTERILE

## (undated) DEVICE — GAUZE SPONGES,16 PLY: Brand: CURITY

## (undated) DEVICE — DRAPE C-ARMOUR

## (undated) DEVICE — ACE WRAP 6 IN UNSTERILE

## (undated) DEVICE — PENCIL ELECTROSURG E-Z CLEAN -0035H

## (undated) DEVICE — SPLINT 5 X 30 IN FAST SET PLASTER

## (undated) DEVICE — OVERDRILL AO, DIA3.5MM X 122MM: Brand: VARIAX

## (undated) DEVICE — DRESSING XEROFORM 5 X 9

## (undated) DEVICE — DRAPE C-ARM X-RAY

## (undated) DEVICE — GLOVE SRG BIOGEL 8

## (undated) DEVICE — CUFF TOURNIQUET 30 X 4 IN QUICK CONNECT DISP 1BLA

## (undated) DEVICE — BANDAGE, ESMARK LF STR 6"X9' (20/CS): Brand: CYPRESS

## (undated) DEVICE — SPONGE SCRUB 4 PCT CHLORHEXIDINE

## (undated) DEVICE — SUT VICRYL 4-0 PS-2 27 IN J426H

## (undated) DEVICE — SUT ETHILON 3-0 PS-1 18 IN 1663H

## (undated) DEVICE — COUNTERSINK FOR SCREWS 2.7,3.5MM: Brand: VARIAX

## (undated) DEVICE — CHLORAPREP HI-LITE 26ML ORANGE

## (undated) DEVICE — PAD GROUNDING ADULT

## (undated) DEVICE — ABDOMINAL PAD: Brand: DERMACEA

## (undated) DEVICE — INTENDED FOR TISSUE SEPARATION, AND OTHER PROCEDURES THAT REQUIRE A SHARP SURGICAL BLADE TO PUNCTURE OR CUT.: Brand: BARD-PARKER SAFETY BLADES SIZE 15, STERILE

## (undated) DEVICE — BULB SYRINGE,IRRIGATION WITH PROTECTIVE CAP: Brand: DOVER